# Patient Record
Sex: FEMALE | Race: WHITE | Employment: OTHER | URBAN - NONMETROPOLITAN AREA
[De-identification: names, ages, dates, MRNs, and addresses within clinical notes are randomized per-mention and may not be internally consistent; named-entity substitution may affect disease eponyms.]

---

## 2017-01-05 ENCOUNTER — AMBUL SURGICAL CARE (OUTPATIENT)
Dept: URBAN - NONMETROPOLITAN AREA SURGERY 1 | Facility: SURGERY | Age: 57
Setting detail: OPHTHALMOLOGY
End: 2017-01-05
Payer: COMMERCIAL

## 2017-01-05 DIAGNOSIS — H25.011: ICD-10-CM

## 2017-01-05 DIAGNOSIS — H25.041: ICD-10-CM

## 2017-01-05 PROCEDURE — 66984 XCAPSL CTRC RMVL W/O ECP: CPT | Performed by: OPHTHALMOLOGY

## 2017-01-05 PROCEDURE — G8918 PT W/O PREOP ORDER IV AB PRO: HCPCS | Performed by: OPHTHALMOLOGY

## 2017-01-05 PROCEDURE — G8907 PT DOC NO EVENTS ON DISCHARG: HCPCS | Performed by: OPHTHALMOLOGY

## 2017-01-06 ENCOUNTER — DOCTOR'S OFFICE (OUTPATIENT)
Dept: URBAN - NONMETROPOLITAN AREA CLINIC 1 | Facility: CLINIC | Age: 57
Setting detail: OPHTHALMOLOGY
End: 2017-01-06

## 2017-01-06 ENCOUNTER — RX ONLY (RX ONLY)
Age: 57
End: 2017-01-06

## 2017-01-06 ENCOUNTER — DOCTOR'S OFFICE (OUTPATIENT)
Dept: URBAN - NONMETROPOLITAN AREA CLINIC 1 | Facility: CLINIC | Age: 57
Setting detail: OPHTHALMOLOGY
End: 2017-01-06
Payer: COMMERCIAL

## 2017-01-06 DIAGNOSIS — H25.011: ICD-10-CM

## 2017-01-06 DIAGNOSIS — H25.012: ICD-10-CM

## 2017-01-06 DIAGNOSIS — Z96.1: ICD-10-CM

## 2017-01-06 PROCEDURE — 99024 POSTOP FOLLOW-UP VISIT: CPT | Performed by: OPTOMETRIST

## 2017-01-06 PROCEDURE — 76519 ECHO EXAM OF EYE: CPT | Performed by: OPHTHALMOLOGY

## 2017-01-06 ASSESSMENT — SPHEQUIV_DERIVED
OS_SPHEQUIV: -8.5
OD_SPHEQUIV: -0.875

## 2017-01-06 ASSESSMENT — REFRACTION_AUTOREFRACTION
OS_CYLINDER: -1.00
OS_AXIS: 93
OS_SPHERE: -8.00
OD_SPHERE: -0.50
OD_AXIS: 148
OD_CYLINDER: -0.75

## 2017-01-06 ASSESSMENT — REFRACTION_CURRENTRX
OS_OVR_VA: 20/
OD_AXIS: 104
OD_VPRISM_DIRECTION: SV
OD_OVR_VA: 20/
OS_OVR_VA: 20/
OS_CYLINDER: -1.75
OD_SPHERE: -8.75
OS_AXIS: 97
OD_CYLINDER: -1.50
OD_OVR_VA: 20/
OS_SPHERE: -7.50
OD_OVR_VA: 20/
OS_OVR_VA: 20/
OS_VPRISM_DIRECTION: SV

## 2017-01-06 ASSESSMENT — REFRACTION_MANIFEST
OS_VA2: 20/
OS_VA3: 20/
OD_VA1: 20/
OD_VA3: 20/
OS_VA3: 20/
OS_VA1: 20/
OU_VA: 20/
OD_VA2: 20/
OU_VA: 20/
OS_VA2: 20/
OD_VA3: 20/
OD_VA2: 20/
OS_VA1: 20/
OS_VA3: 20/
OD_VA3: 20/
OU_VA: 20/
OS_VA1: 20/
OD_VA1: 20/
OS_VA2: 20/
OD_VA1: 20/
OD_VA2: 20/

## 2017-01-06 ASSESSMENT — VISUAL ACUITY
OD_BCVA: 20/25-2
OS_BCVA: 20/40-1

## 2017-01-12 ENCOUNTER — AMBUL SURGICAL CARE (OUTPATIENT)
Dept: URBAN - NONMETROPOLITAN AREA SURGERY 1 | Facility: SURGERY | Age: 57
Setting detail: OPHTHALMOLOGY
End: 2017-01-12
Payer: COMMERCIAL

## 2017-01-12 DIAGNOSIS — H25.042: ICD-10-CM

## 2017-01-12 DIAGNOSIS — H25.012: ICD-10-CM

## 2017-01-12 PROCEDURE — 66984 XCAPSL CTRC RMVL W/O ECP: CPT | Performed by: OPHTHALMOLOGY

## 2017-01-12 PROCEDURE — G8918 PT W/O PREOP ORDER IV AB PRO: HCPCS | Performed by: OPHTHALMOLOGY

## 2017-01-12 PROCEDURE — G8907 PT DOC NO EVENTS ON DISCHARG: HCPCS | Performed by: OPHTHALMOLOGY

## 2017-01-13 ENCOUNTER — RX ONLY (RX ONLY)
Age: 57
End: 2017-01-13

## 2017-01-13 ENCOUNTER — DOCTOR'S OFFICE (OUTPATIENT)
Dept: URBAN - NONMETROPOLITAN AREA CLINIC 1 | Facility: CLINIC | Age: 57
Setting detail: OPHTHALMOLOGY
End: 2017-01-13
Payer: COMMERCIAL

## 2017-01-13 DIAGNOSIS — Z96.1: ICD-10-CM

## 2017-01-13 PROBLEM — H25.012 CATARACT CORTICAL SENILE; LEFT EYE: Status: RESOLVED | Noted: 2017-01-13 | Resolved: 2017-01-13

## 2017-01-13 PROCEDURE — 99024 POSTOP FOLLOW-UP VISIT: CPT | Performed by: OPHTHALMOLOGY

## 2017-01-13 ASSESSMENT — REFRACTION_MANIFEST
OS_VA1: 20/
OD_VA1: 20/
OU_VA: 20/
OS_VA2: 20/
OS_VA1: 20/
OS_VA3: 20/
OS_VA1: 20/
OS_VA2: 20/
OU_VA: 20/
OS_VA2: 20/
OD_VA1: 20/
OD_VA2: 20/
OD_VA3: 20/
OD_VA2: 20/
OS_VA3: 20/
OD_VA2: 20/
OU_VA: 20/
OD_VA3: 20/
OD_VA1: 20/
OD_VA3: 20/
OS_VA3: 20/

## 2017-01-13 ASSESSMENT — REFRACTION_CURRENTRX
OS_OVR_VA: 20/
OD_VPRISM_DIRECTION: SV
OS_VPRISM_DIRECTION: SV
OD_AXIS: 104
OS_OVR_VA: 20/
OS_OVR_VA: 20/
OD_CYLINDER: -1.50
OD_OVR_VA: 20/
OD_OVR_VA: 20/
OD_SPHERE: -8.75
OD_OVR_VA: 20/
OS_SPHERE: -7.50
OS_CYLINDER: -1.75
OS_AXIS: 97

## 2017-01-13 ASSESSMENT — SPHEQUIV_DERIVED
OD_SPHEQUIV: -0.5
OS_SPHEQUIV: 0.375

## 2017-01-13 ASSESSMENT — REFRACTION_AUTOREFRACTION
OS_AXIS: 060
OS_CYLINDER: -0.75
OD_AXIS: 142
OD_SPHERE: +1.25
OS_SPHERE: +0.75
OD_CYLINDER: -3.50

## 2017-01-13 ASSESSMENT — VISUAL ACUITY
OS_BCVA: 20/40
OD_BCVA: 20/30-1

## 2017-01-24 ENCOUNTER — DOCTOR'S OFFICE (OUTPATIENT)
Dept: URBAN - NONMETROPOLITAN AREA CLINIC 1 | Facility: CLINIC | Age: 57
Setting detail: OPHTHALMOLOGY
End: 2017-01-24
Payer: COMMERCIAL

## 2017-01-24 DIAGNOSIS — Z96.1: ICD-10-CM

## 2017-01-24 DIAGNOSIS — H04.122: ICD-10-CM

## 2017-01-24 DIAGNOSIS — H04.121: ICD-10-CM

## 2017-01-24 PROCEDURE — 99024 POSTOP FOLLOW-UP VISIT: CPT | Performed by: OPHTHALMOLOGY

## 2017-01-24 ASSESSMENT — REFRACTION_MANIFEST
OU_VA: 20/
OS_VA2: 20/
OS_VA3: 20/
OD_VA2: 20/
OD_VA1: 20/
OD_VA1: 20/
OS_VA1: 20/
OS_VA2: 20/
OS_VA3: 20/
OD_VA1: 20/
OS_VA1: 20/
OS_VA3: 20/
OD_VA3: 20/
OS_VA2: 20/
OD_VA3: 20/
OD_VA3: 20/
OU_VA: 20/
OU_VA: 20/
OD_VA2: 20/
OS_VA1: 20/
OD_VA2: 20/

## 2017-01-24 ASSESSMENT — REFRACTION_CURRENTRX
OS_SPHERE: -7.50
OD_SPHERE: -8.75
OD_OVR_VA: 20/
OS_OVR_VA: 20/
OS_CYLINDER: -1.75
OD_OVR_VA: 20/
OD_AXIS: 104
OS_OVR_VA: 20/
OD_CYLINDER: -1.50
OD_OVR_VA: 20/
OS_AXIS: 97
OS_OVR_VA: 20/
OD_VPRISM_DIRECTION: SV
OS_VPRISM_DIRECTION: SV

## 2017-01-24 ASSESSMENT — SPHEQUIV_DERIVED
OD_SPHEQUIV: -0.75
OS_SPHEQUIV: 0.125

## 2017-01-24 ASSESSMENT — DRY EYES - PHYSICIAN NOTES
OS_GENERALCOMMENTS: KSICCA
OD_GENERALCOMMENTS: KSICCA

## 2017-01-24 ASSESSMENT — CONFRONTATIONAL VISUAL FIELD TEST (CVF)
OD_FINDINGS: FULL
OS_FINDINGS: FULL

## 2017-01-24 ASSESSMENT — VISUAL ACUITY
OD_BCVA: 20/25+2
OS_BCVA: 20/40

## 2017-01-24 ASSESSMENT — REFRACTION_AUTOREFRACTION
OD_AXIS: 117
OD_SPHERE: -0.25
OD_CYLINDER: -1.00
OS_CYLINDER: -0.75
OS_SPHERE: +0.50
OS_AXIS: 066

## 2017-06-08 ENCOUNTER — DOCTOR'S OFFICE (OUTPATIENT)
Dept: URBAN - NONMETROPOLITAN AREA CLINIC 1 | Facility: CLINIC | Age: 57
Setting detail: OPHTHALMOLOGY
End: 2017-06-08

## 2017-06-08 DIAGNOSIS — Z96.1: ICD-10-CM

## 2017-06-08 PROCEDURE — DISABILITY BUREAU OF DISABILITY REPORT: Performed by: OPHTHALMOLOGY

## 2017-07-25 ENCOUNTER — DOCTOR'S OFFICE (OUTPATIENT)
Dept: URBAN - NONMETROPOLITAN AREA CLINIC 1 | Facility: CLINIC | Age: 57
Setting detail: OPHTHALMOLOGY
End: 2017-07-25
Payer: COMMERCIAL

## 2017-07-25 DIAGNOSIS — E11.9: ICD-10-CM

## 2017-07-25 DIAGNOSIS — H52.13: ICD-10-CM

## 2017-07-25 DIAGNOSIS — H43.813: ICD-10-CM

## 2017-07-25 DIAGNOSIS — H04.121: ICD-10-CM

## 2017-07-25 DIAGNOSIS — Z96.1: ICD-10-CM

## 2017-07-25 DIAGNOSIS — H26.492: ICD-10-CM

## 2017-07-25 DIAGNOSIS — H04.122: ICD-10-CM

## 2017-07-25 DIAGNOSIS — H40.013: ICD-10-CM

## 2017-07-25 PROCEDURE — 92250 FUNDUS PHOTOGRAPHY W/I&R: CPT | Performed by: OPHTHALMOLOGY

## 2017-07-25 PROCEDURE — 83861 MICROFLUID ANALY TEARS: CPT | Performed by: OPHTHALMOLOGY

## 2017-07-25 PROCEDURE — 92014 COMPRE OPH EXAM EST PT 1/>: CPT | Performed by: OPHTHALMOLOGY

## 2017-07-25 ASSESSMENT — REFRACTION_CURRENTRX
OD_OVR_VA: 20/
OD_CYLINDER: -1.50
OD_VPRISM_DIRECTION: SV
OS_OVR_VA: 20/
OS_SPHERE: -7.50
OS_CYLINDER: -1.75
OD_OVR_VA: 20/
OS_AXIS: 97
OD_OVR_VA: 20/
OS_OVR_VA: 20/
OS_VPRISM_DIRECTION: SV
OD_AXIS: 104
OD_SPHERE: -8.75
OS_OVR_VA: 20/

## 2017-07-25 ASSESSMENT — REFRACTION_MANIFEST
OS_VA3: 20/
OS_VA2: 20/
OD_VA3: 20/
OD_VA2: 20/
OS_VA3: 20/
OD_VA3: 20/
OD_VA1: 20/
OS_VA2: 20/
OS_VA1: 20/
OU_VA: 20/
OD_VA3: 20/
OS_VA1: 20/
OU_VA: 20/
OD_VA1: 20/
OD_VA2: 20/
OD_VA1: 20/
OU_VA: 20/
OS_VA2: 20/
OD_VA2: 20/
OS_VA3: 20/
OS_VA1: 20/

## 2017-07-25 ASSESSMENT — DRY EYES - PHYSICIAN NOTES
OD_GENERALCOMMENTS: KSICCA
OS_GENERALCOMMENTS: KSICCA

## 2017-07-25 ASSESSMENT — CONFRONTATIONAL VISUAL FIELD TEST (CVF)
OD_FINDINGS: FULL
OS_FINDINGS: FULL

## 2017-07-25 ASSESSMENT — VISUAL ACUITY
OS_BCVA: 20/40
OD_BCVA: 20/20

## 2017-07-25 ASSESSMENT — SPHEQUIV_DERIVED: OD_SPHEQUIV: -1.25

## 2017-07-25 ASSESSMENT — REFRACTION_AUTOREFRACTION
OD_SPHERE: -0.50
OD_CYLINDER: -1.50
OD_AXIS: 129

## 2017-08-02 ENCOUNTER — DOCTOR'S OFFICE (OUTPATIENT)
Dept: URBAN - NONMETROPOLITAN AREA CLINIC 1 | Facility: CLINIC | Age: 57
Setting detail: OPHTHALMOLOGY
End: 2017-08-02
Payer: COMMERCIAL

## 2017-08-02 DIAGNOSIS — H40.013: ICD-10-CM

## 2017-08-02 DIAGNOSIS — H26.493: ICD-10-CM

## 2017-08-02 DIAGNOSIS — H04.123: ICD-10-CM

## 2017-08-02 DIAGNOSIS — H43.812: ICD-10-CM

## 2017-08-02 DIAGNOSIS — E11.9: ICD-10-CM

## 2017-08-02 PROCEDURE — 92014 COMPRE OPH EXAM EST PT 1/>: CPT | Performed by: OPHTHALMOLOGY

## 2017-08-02 ASSESSMENT — REFRACTION_MANIFEST
OD_VA3: 20/
OS_VA3: 20/
OU_VA: 20/
OS_VA1: 20/
OS_VA3: 20/
OD_VA2: 20/
OU_VA: 20/
OS_VA2: 20/
OD_VA1: 20/
OU_VA: 20/
OD_VA1: 20/
OS_VA1: 20/
OS_VA3: 20/
OD_VA3: 20/
OD_VA1: 20/
OS_VA2: 20/
OD_VA2: 20/
OS_VA1: 20/
OS_VA2: 20/
OD_VA3: 20/
OD_VA2: 20/

## 2017-08-02 ASSESSMENT — REFRACTION_AUTOREFRACTION
OD_SPHERE: -0.50
OD_CYLINDER: -1.50
OD_AXIS: 129

## 2017-08-02 ASSESSMENT — REFRACTION_CURRENTRX
OS_SPHERE: -7.50
OD_SPHERE: -8.75
OD_OVR_VA: 20/
OS_OVR_VA: 20/
OD_OVR_VA: 20/
OS_OVR_VA: 20/
OD_CYLINDER: -1.50
OS_VPRISM_DIRECTION: SV
OS_OVR_VA: 20/
OS_AXIS: 97
OS_CYLINDER: -1.75
OD_AXIS: 104
OD_OVR_VA: 20/
OD_VPRISM_DIRECTION: SV

## 2017-08-02 ASSESSMENT — VISUAL ACUITY
OD_BCVA: 20/20
OS_BCVA: 20/50

## 2017-08-02 ASSESSMENT — CONFRONTATIONAL VISUAL FIELD TEST (CVF)
OD_FINDINGS: FULL
OS_FINDINGS: FULL

## 2017-08-02 ASSESSMENT — SPHEQUIV_DERIVED: OD_SPHEQUIV: -1.25

## 2017-08-02 ASSESSMENT — DRY EYES - PHYSICIAN NOTES
OS_GENERALCOMMENTS: KSICCA
OD_GENERALCOMMENTS: KSICCA

## 2019-09-12 PROBLEM — N61.1 BREAST ABSCESS: Status: ACTIVE | Noted: 2019-09-12

## 2019-09-12 PROBLEM — N61.0 CELLULITIS OF BREAST: Status: ACTIVE | Noted: 2019-09-12

## 2019-11-11 NOTE — PERIOP NOTES
PAT - SURGICAL PRE-ADMISSION INSTRUCTIONS    NAME:  Eugenia Ansari                                                          TODAY'S DATE:  11/11/2019    SURGERY DATE:  11/12/2019                                  SURGERY ARRIVAL TIME:   0700    1. Do NOT eat or drink anything, including candy or gum, after MIDNIGHT on 11/11/19 , unless you have specific instructions from your Surgeon or Anesthesia Provider to do so. 2. No smoking on the day of surgery. 3. No alcohol 24 hours prior to the day of surgery. 4. No recreational drugs for one week prior to the day of surgery. 5. Leave all valuables, including money/purse, at home. 6. Remove all jewelry, nail polish, makeup (including mascara); no lotions, powders, deodorant, or perfume/cologne/after shave. 7. Glasses/Contact lenses and Dentures may be worn to the hospital.  They will be removed prior to surgery. 8. Call your doctor if symptoms of a cold or illness develop within 24 ours prior to surgery. 9. AN ADULT MUST DRIVE YOU HOME AFTER OUTPATIENT SURGERY. 10. If you are having an OUTPATIENT procedure, please make arrangements for a responsible adult to be with you for 24 hours after your surgery. 11. If you are admitted to the hospital, you will be assigned to a bed after surgery is complete. Normally a family member will not be able to see you until you are in your assigned bed. 15. Family is encouraged to accompany you to the hospital.  We ask visitors in the treatment area to be limited to ONE person at a time to ensure patient privacy. EXCEPTIONS WILL BE MADE AS NEEDED. 15. Children under 12 are discouraged from entering the treatment area and need to be supervised by an adult when in the waiting room. Special Instructions: Take these medications the morning of surgery with a sip of water:  AMLODIPINE, Follow physician instructions about insulin.   If NO instructions were given, take your usual dose of BASAL insulin the night BEFORE surgery. Patient Prep:    shower with anti-bacterial soap    These surgical instructions were reviewed with PATIENT during the PAT PHONE CALL. Directions: On the morning of surgery, please go to the [facility]. Enter the building from the [facility] entrance, 1st floor (next to the Emergency Room entrance). Take the elevator to the 2nd floor. Sign in at the Registration Desk.     If you have any questions and/or concerns, please do not hesitate to call:  (Prior to the day of surgery)  [facility] [place] unit:  [phone]  (Day of surgery)  [facility] unit:  [phone]

## 2019-11-12 ENCOUNTER — HOSPITAL ENCOUNTER (OUTPATIENT)
Age: 59
Setting detail: OUTPATIENT SURGERY
Discharge: HOME | End: 2019-11-12
Attending: INTERNAL MEDICINE | Admitting: INTERNAL MEDICINE
Payer: MEDICAID

## 2019-11-12 ENCOUNTER — ANESTHESIA (OUTPATIENT)
Dept: ENDOSCOPY | Age: 59
End: 2019-11-12
Payer: MEDICAID

## 2019-11-12 ENCOUNTER — ANESTHESIA EVENT (OUTPATIENT)
Dept: ENDOSCOPY | Age: 59
End: 2019-11-12
Payer: MEDICAID

## 2019-11-12 VITALS
HEART RATE: 79 BPM | OXYGEN SATURATION: 98 % | WEIGHT: 163.8 LBS | SYSTOLIC BLOOD PRESSURE: 127 MMHG | TEMPERATURE: 98.3 F | RESPIRATION RATE: 16 BRPM | HEIGHT: 61 IN | BODY MASS INDEX: 30.93 KG/M2 | DIASTOLIC BLOOD PRESSURE: 65 MMHG

## 2019-11-12 LAB
GLUCOSE BLD STRIP.AUTO-MCNC: 115 MG/DL (ref 70–110)
GLUCOSE BLD STRIP.AUTO-MCNC: 118 MG/DL (ref 70–110)
GLUCOSE BLD STRIP.AUTO-MCNC: 128 MG/DL (ref 70–110)

## 2019-11-12 PROCEDURE — 74011250636 HC RX REV CODE- 250/636: Performed by: NURSE ANESTHETIST, CERTIFIED REGISTERED

## 2019-11-12 PROCEDURE — 76060000031 HC ANESTHESIA FIRST 0.5 HR: Performed by: INTERNAL MEDICINE

## 2019-11-12 PROCEDURE — 77030037186 HC VLV ENDOSC STRL DEFENDO DISP MVAT -A: Performed by: INTERNAL MEDICINE

## 2019-11-12 PROCEDURE — 88342 IMHCHEM/IMCYTCHM 1ST ANTB: CPT

## 2019-11-12 PROCEDURE — 88305 TISSUE EXAM BY PATHOLOGIST: CPT

## 2019-11-12 PROCEDURE — 76040000019: Performed by: INTERNAL MEDICINE

## 2019-11-12 PROCEDURE — 82962 GLUCOSE BLOOD TEST: CPT

## 2019-11-12 RX ORDER — SODIUM CHLORIDE 0.9 % (FLUSH) 0.9 %
5-40 SYRINGE (ML) INJECTION EVERY 8 HOURS
Status: CANCELLED | OUTPATIENT
Start: 2019-11-12

## 2019-11-12 RX ORDER — SODIUM CHLORIDE 0.9 % (FLUSH) 0.9 %
5-40 SYRINGE (ML) INJECTION AS NEEDED
Status: CANCELLED | OUTPATIENT
Start: 2019-11-12

## 2019-11-12 RX ORDER — DEXTROMETHORPHAN/PSEUDOEPHED 2.5-7.5/.8
1.2 DROPS ORAL
Status: CANCELLED | OUTPATIENT
Start: 2019-11-12

## 2019-11-12 RX ORDER — SODIUM CHLORIDE, SODIUM LACTATE, POTASSIUM CHLORIDE, CALCIUM CHLORIDE 600; 310; 30; 20 MG/100ML; MG/100ML; MG/100ML; MG/100ML
INJECTION, SOLUTION INTRAVENOUS
Status: DISCONTINUED | OUTPATIENT
Start: 2019-11-12 | End: 2019-11-12 | Stop reason: HOSPADM

## 2019-11-12 RX ORDER — PROPOFOL 10 MG/ML
INJECTION, EMULSION INTRAVENOUS AS NEEDED
Status: DISCONTINUED | OUTPATIENT
Start: 2019-11-12 | End: 2019-11-12 | Stop reason: HOSPADM

## 2019-11-12 RX ADMIN — PROPOFOL 100 MG: 10 INJECTION, EMULSION INTRAVENOUS at 09:11

## 2019-11-12 RX ADMIN — SODIUM CHLORIDE, SODIUM LACTATE, POTASSIUM CHLORIDE, AND CALCIUM CHLORIDE: 600; 310; 30; 20 INJECTION, SOLUTION INTRAVENOUS at 08:40

## 2019-11-12 RX ADMIN — PROPOFOL 20 MG: 10 INJECTION, EMULSION INTRAVENOUS at 09:13

## 2019-11-12 NOTE — ANESTHESIA POSTPROCEDURE EVALUATION
Procedure(s):  ESOPHAGOGASTRODUODENOSCOPY (EGD).     MAC    Anesthesia Post Evaluation        Patient location during evaluation: PACU  Patient participation: complete - patient participated  Level of consciousness: awake and alert  Pain score: 0  Airway patency: patent  Anesthetic complications: no  Cardiovascular status: stable  Respiratory status: room air  Hydration status: stable  Post anesthesia nausea and vomiting:  none      Vitals Value Taken Time   /65 11/12/2019  9:21 AM   Temp     Pulse 79 11/12/2019  9:21 AM   Resp 16 11/12/2019  9:21 AM   SpO2 98 % 11/12/2019  9:21 AM

## 2019-11-12 NOTE — PROCEDURES
Endoscopy Procedure Note    Patient: Federcio Echavarria MRN: [id number]  SSN: [id number]    YOB: 1960  Age: 63 y.o. Sex: female      Date/Time:  11/12/2019 9:20 AM    Esophagogastroduodenoscopy (EGD) Procedure Note    Procedure: Esophagogastroduodenoscopy with biopsy    IMPRESSION:   1. Mild gastritis. Biopsies taken for H. Pylori. 2. Otherwise normal EGD. RECOMMENDATIONS:  1. Resume regular diet. 2. Will contact with biopsy results in 1-2 weeks. 3. Follow up in office in 4-6 weeks. Indication: Abdominal pain, epigastric  :  James Heller MD  Assistants: Endoscopy Technician-1: Edward Sutherland CNA  PACU Nurse: Rusty Lomax RN    Referring Provider:   None  History: The history and physical exam were reviewed and updated. Endoscope: Olympus GIF-190 diagnostic endoscope  Extent of Exam: third portion of the duodenum  ASA: ASA 3 - Patient with moderate systemic disease with functional limitations  Anethesia/Sedation:  MAC anesthesia    Description of the procedure: The procedure was discussed with the patient including risks, benefits, alternatives including risks of iv sedation, bleeding, perforation and aspiration. A safety timeout was performed. The patient was placed in the left lateral decubitus position. A bite block was placed. The patient was given incremental doses of intravenous sedation until moderate sedation was achieved. The patients vital signs were monitored at all times including heart rate/rhythm, blood pressure and oxygen saturation. The endoscope was then passed under direct visualization to the third portion of the duodenum. The endoscope was then slowly withdrawn while visualizing the mucosa. In the stomach a retroflexion was performed and gastric fundus and cardia visualized. The endoscope was then slowly withdrawn. The patient was then transferred to recovery in stable condition. Findings:    Esophagus: The esophageal mucosa was normal with no ulceration, mass or stricture. There was no evidence of Brady's esophagus or reflux esophagitis. Stomach: The gastric mucosa was erythematous. No ulceration, mass, stricture. Biopsies taken for H. Pylori. Duodenum: The duodenum mucosa was normal with no ulceration, mass, stricture and no evidence of villous atrophy. Therapies:  None    Specimens:   ID Type Source Tests Collected by Time Destination   1 : bx r/o h pylori x 3 Preservative Gastric  Clista MD Mireille 11/12/2019 8332 Pathology               Complications:   None; patient tolerated the procedure well.     EBL:None    Discharge disposition:  Home in the company of  when able to ambulate    Laurence Thrasher MD  November 12, 2019  9:20 AM

## 2019-11-12 NOTE — ANESTHESIA PREPROCEDURE EVALUATION
Relevant Problems   No relevant active problems       Anesthetic History   No history of anesthetic complications            Review of Systems / Medical History  Patient summary reviewed, nursing notes reviewed and pertinent labs reviewed    Pulmonary  Within defined limits                 Neuro/Psych   Within defined limits           Cardiovascular    Hypertension              Exercise tolerance: >4 METS     GI/Hepatic/Renal     GERD      PUD     Endo/Other    Diabetes    Morbid obesity     Other Findings   Comments: neuropathy           Physical Exam    Airway  Mallampati: II  TM Distance: 4 - 6 cm    Mouth opening: Normal     Cardiovascular  Regular rate and rhythm,  S1 and S2 normal,  no murmur, click, rub, or gallop  Rhythm: regular  Rate: normal         Dental    Dentition: Full lower dentures and Full upper dentures     Pulmonary  Breath sounds clear to auscultation               Abdominal  GI exam deferred       Other Findings            Anesthetic Plan    ASA: 3  Anesthesia type: MAC          Induction: Intravenous  Anesthetic plan and risks discussed with: Patient

## 2019-11-12 NOTE — DISCHARGE INSTRUCTIONS
Patient Discharge Instructions    Carlyn Casillas / [id number] : 1960    Admitted 2019 Discharged: 2019         Procedure Impression:  1. Mild gastritis. Biopsies taken for H. Pylori. 2. Otherwise normal EGD. Recommendation:  1. Resume regular diet. 2. Will contact with biopsy results in 2 weeks   3. Follow up in office in 4 weeks. Recommended Diet: Regular Diet    Recommended Activity:    1. Do not drink alcohol, drive or operate machinery for 12 hours   2. Call if any fever, abdominal pain or bleeding noted. Signed By: Yusuf Gupta MD     2019         DISCHARGE SUMMARY from Nurse    PATIENT INSTRUCTIONS:    After general anesthesia or intravenous sedation, for 24 hours or while taking prescription Narcotics:  · Limit your activities  · Do not drive and operate hazardous machinery  · Do not make important personal or business decisions  · Do  not drink alcoholic beverages  · If you have not urinated within 8 hours after discharge, please contact your surgeon on call. Report the following to your surgeon:  · Excessive pain, swelling, redness or odor of or around the surgical area  · Temperature over 100.5  · Nausea and vomiting lasting longer than 4 hours or if unable to take medications  · Any signs of decreased circulation or nerve impairment to extremity: change in color, persistent  numbness, tingling, coldness or increase pain  · Any questions      These are general instructions for a healthy lifestyle:    No smoking/ No tobacco products/ Avoid exposure to second hand smoke  Surgeon General's Warning:  Quitting smoking now greatly reduces serious risk to your health.     Obesity, smoking, and sedentary lifestyle greatly increases your risk for illness    A healthy diet, regular physical exercise & weight monitoring are important for maintaining a healthy lifestyle    You may be retaining fluid if you have a history of heart failure or if you experience any of the following symptoms:  Weight gain of 3 pounds or more overnight or 5 pounds in a week, increased swelling in our hands or feet or shortness of breath while lying flat in bed. Please call your doctor as soon as you notice any of these symptoms; do not wait until your next office visit. The discharge information has been reviewed with the patient. The patient verbalized understanding. Discharge medications reviewed with the patient and appropriate educational materials and side effects teaching were provided. ___________Patient armband removed and given to patient to take home.   Patient was informed of the privacy risks if armband lost or stolen  ________________________________________________________________________________________________________________________

## 2019-11-12 NOTE — PERIOP NOTES
Phase 2 Recovery Summary    Report received from [facility]. Md discussed procedure with pt. Vitals:    11/11/19 1729 11/12/19 0742 11/12/19 0920 11/12/19 0921   BP:  176/78 127/65 127/65   Pulse:  87 79 79   Resp:  16 16 16   Temp:  98.3 °F (36.8 °C)     SpO2:  99% 98% 98%   Weight: 73.5 kg (162 lb) 74.3 kg (163 lb 12.8 oz)     Height: 5' 1\" (1.549 m) 5' 1\" (1.549 m)         oriented to time, place, person and situation          Lines and Drains  Peripheral Intravenous Line:      Wound  Wound Breast Right (Active)   Number of days: 60        Patient assisted to chair with minimal assist. Pateint tolerating liquids well. Patient's family at bedside. Discharge discussed with patient and family. No questions or concerns at this time. Patient had time to ask any questions. Patient discharged to home, with FM.       Eh Benz RN

## 2019-11-12 NOTE — H&P
Date of Surgery Update:  Jerome Gann was seen and examined. History and physical has been reviewed. The patient has been examined.  There have been no significant clinical changes since the completion of the originally dated History and Physical.    Signed By: Yvette Luciano MD     November 12, 2019 8:58 AM

## 2019-11-26 ENCOUNTER — DOCTOR'S OFFICE (OUTPATIENT)
Dept: URBAN - NONMETROPOLITAN AREA CLINIC 1 | Facility: CLINIC | Age: 59
Setting detail: OPHTHALMOLOGY
End: 2019-11-26
Payer: COMMERCIAL

## 2019-11-26 DIAGNOSIS — H04.123: ICD-10-CM

## 2019-11-26 DIAGNOSIS — E11.9: ICD-10-CM

## 2019-11-26 DIAGNOSIS — Z96.1: ICD-10-CM

## 2019-11-26 DIAGNOSIS — H40.013: ICD-10-CM

## 2019-11-26 DIAGNOSIS — H26.493: ICD-10-CM

## 2019-11-26 DIAGNOSIS — H43.812: ICD-10-CM

## 2019-11-26 PROCEDURE — 92133 CPTRZD OPH DX IMG PST SGM ON: CPT | Performed by: OPHTHALMOLOGY

## 2019-11-26 PROCEDURE — 76514 ECHO EXAM OF EYE THICKNESS: CPT | Performed by: OPHTHALMOLOGY

## 2019-11-26 PROCEDURE — 92014 COMPRE OPH EXAM EST PT 1/>: CPT | Performed by: OPHTHALMOLOGY

## 2019-11-26 ASSESSMENT — REFRACTION_MANIFEST
OU_VA: 20/
OD_VA2: 20/
OS_VA3: 20/
OS_VA1: 20/
OD_VA3: 20/
OD_VA2: 20/
OS_VA2: 20/
OS_VA2: 20/
OD_VA1: 20/
OS_VA3: 20/
OU_VA: 20/
OD_VA1: 20/
OD_VA3: 20/
OS_VA1: 20/

## 2019-11-26 ASSESSMENT — REFRACTION_CURRENTRX
OS_OVR_VA: 20/
OS_OVR_VA: 20/
OD_OVR_VA: 20/
OD_AXIS: 104
OS_VPRISM_DIRECTION: SV
OS_AXIS: 97
OS_SPHERE: -7.50
OD_OVR_VA: 20/
OD_OVR_VA: 20/
OS_OVR_VA: 20/
OD_SPHERE: -8.75
OD_VPRISM_DIRECTION: SV
OS_CYLINDER: -1.75
OD_CYLINDER: -1.50

## 2019-11-26 ASSESSMENT — VISUAL ACUITY
OS_BCVA: 20/80
OD_BCVA: 20/30

## 2019-11-26 ASSESSMENT — PACHYMETRY
OD_CT_UM: 575
OD_CT_CORRECTION: -2
OS_CT_UM: 584
OS_CT_CORRECTION: -3

## 2019-11-26 ASSESSMENT — CONFRONTATIONAL VISUAL FIELD TEST (CVF)
OD_FINDINGS: FULL
OS_FINDINGS: FULL

## 2019-11-26 ASSESSMENT — REFRACTION_AUTOREFRACTION
OD_AXIS: 106
OS_CYLINDER: -0.50
OS_AXIS: 97
OS_SPHERE: -0.25
OD_CYLINDER: -0.75
OD_SPHERE: -0.50

## 2019-11-26 ASSESSMENT — SPHEQUIV_DERIVED
OS_SPHEQUIV: -0.5
OD_SPHEQUIV: -0.875

## 2019-11-26 ASSESSMENT — DRY EYES - PHYSICIAN NOTES
OS_GENERALCOMMENTS: 1+ PEE
OD_GENERALCOMMENTS: 1+ PEE

## 2020-06-09 ENCOUNTER — RX ONLY (RX ONLY)
Age: 60
End: 2020-06-09

## 2020-06-09 ENCOUNTER — DOCTOR'S OFFICE (OUTPATIENT)
Dept: URBAN - NONMETROPOLITAN AREA CLINIC 1 | Facility: CLINIC | Age: 60
Setting detail: OPHTHALMOLOGY
End: 2020-06-09
Payer: COMMERCIAL

## 2020-06-09 VITALS — HEIGHT: 55 IN

## 2020-06-09 DIAGNOSIS — H43.812: ICD-10-CM

## 2020-06-09 DIAGNOSIS — Z96.1: ICD-10-CM

## 2020-06-09 DIAGNOSIS — H40.013: ICD-10-CM

## 2020-06-09 DIAGNOSIS — H04.123: ICD-10-CM

## 2020-06-09 DIAGNOSIS — H26.493: ICD-10-CM

## 2020-06-09 DIAGNOSIS — E11.9: ICD-10-CM

## 2020-06-09 PROCEDURE — 92014 COMPRE OPH EXAM EST PT 1/>: CPT | Performed by: OPHTHALMOLOGY

## 2020-06-09 ASSESSMENT — REFRACTION_AUTOREFRACTION
OS_SPHERE: +0.50
OS_AXIS: 096
OD_SPHERE: -1.00
OD_CYLINDER: -1.00
OS_CYLINDER: -2.25
OD_AXIS: 122

## 2020-06-09 ASSESSMENT — REFRACTION_CURRENTRX
OS_OVR_VA: 20/
OD_AXIS: 104
OD_VPRISM_DIRECTION: SV
OD_OVR_VA: 20/
OS_SPHERE: -7.50
OS_AXIS: 97
OS_CYLINDER: -1.75
OD_SPHERE: -8.75
OD_CYLINDER: -1.50
OS_VPRISM_DIRECTION: SV

## 2020-06-09 ASSESSMENT — DRY EYES - PHYSICIAN NOTES
OD_GENERALCOMMENTS: 1+ PEE
OS_GENERALCOMMENTS: 1+ PEE

## 2020-06-09 ASSESSMENT — CONFRONTATIONAL VISUAL FIELD TEST (CVF)
OD_FINDINGS: FULL
OS_FINDINGS: FULL

## 2020-06-09 ASSESSMENT — VISUAL ACUITY
OS_BCVA: 20/70
OD_BCVA: 20/40

## 2020-06-09 ASSESSMENT — SPHEQUIV_DERIVED
OD_SPHEQUIV: -1.5
OS_SPHEQUIV: -0.625

## 2020-06-23 ENCOUNTER — AMBUL SURGICAL CARE (OUTPATIENT)
Dept: URBAN - NONMETROPOLITAN AREA SURGERY 1 | Facility: SURGERY | Age: 60
Setting detail: OPHTHALMOLOGY
End: 2020-06-23
Payer: COMMERCIAL

## 2020-06-23 DIAGNOSIS — H26.492: ICD-10-CM

## 2020-06-23 PROCEDURE — G8918 PT W/O PREOP ORDER IV AB PRO: HCPCS | Performed by: CLINIC/CENTER

## 2020-06-23 PROCEDURE — 66821 AFTER CATARACT LASER SURGERY: CPT | Performed by: OPHTHALMOLOGY

## 2020-06-23 PROCEDURE — G8907 PT DOC NO EVENTS ON DISCHARG: HCPCS | Performed by: OPHTHALMOLOGY

## 2020-06-23 PROCEDURE — 66821 AFTER CATARACT LASER SURGERY: CPT | Performed by: CLINIC/CENTER

## 2020-06-23 PROCEDURE — G8918 PT W/O PREOP ORDER IV AB PRO: HCPCS | Performed by: OPHTHALMOLOGY

## 2020-06-23 PROCEDURE — G8907 PT DOC NO EVENTS ON DISCHARG: HCPCS | Performed by: CLINIC/CENTER

## 2020-11-03 ENCOUNTER — DOCTOR'S OFFICE (OUTPATIENT)
Dept: URBAN - NONMETROPOLITAN AREA CLINIC 1 | Facility: CLINIC | Age: 60
Setting detail: OPHTHALMOLOGY
End: 2020-11-03
Payer: COMMERCIAL

## 2020-11-03 VITALS — HEIGHT: 55 IN

## 2020-11-03 DIAGNOSIS — H26.491: ICD-10-CM

## 2020-11-03 DIAGNOSIS — H43.812: ICD-10-CM

## 2020-11-03 DIAGNOSIS — E11.9: ICD-10-CM

## 2020-11-03 DIAGNOSIS — Z96.1: ICD-10-CM

## 2020-11-03 DIAGNOSIS — H40.013: ICD-10-CM

## 2020-11-03 DIAGNOSIS — H04.123: ICD-10-CM

## 2020-11-03 PROCEDURE — 92133 CPTRZD OPH DX IMG PST SGM ON: CPT | Performed by: OPHTHALMOLOGY

## 2020-11-03 PROCEDURE — 92014 COMPRE OPH EXAM EST PT 1/>: CPT | Performed by: OPHTHALMOLOGY

## 2020-11-03 ASSESSMENT — REFRACTION_CURRENTRX
OS_OVR_VA: 20/
OD_AXIS: 104
OD_SPHERE: -8.75
OS_CYLINDER: -1.75
OS_SPHERE: -7.50
OD_VPRISM_DIRECTION: SV
OS_AXIS: 97
OD_OVR_VA: 20/
OS_VPRISM_DIRECTION: SV
OD_CYLINDER: -1.50

## 2020-11-03 ASSESSMENT — REFRACTION_AUTOREFRACTION
OD_CYLINDER: -0.75
OS_SPHERE: -0.75
OS_CYLINDER: -0.50
OS_AXIS: 84
OD_SPHERE: -1.00
OD_AXIS: 120

## 2020-11-03 ASSESSMENT — CONFRONTATIONAL VISUAL FIELD TEST (CVF)
OS_FINDINGS: FULL
OD_FINDINGS: FULL

## 2020-11-03 ASSESSMENT — PACHYMETRY
OD_CT_UM: 590
OS_CT_CORRECTION: -1
OD_CT_CORRECTION: -4
OS_CT_UM: 567

## 2020-11-03 ASSESSMENT — SPHEQUIV_DERIVED
OS_SPHEQUIV: -1
OD_SPHEQUIV: -1.375

## 2020-11-03 ASSESSMENT — DRY EYES - PHYSICIAN NOTES
OS_GENERALCOMMENTS: 1+ PEE
OD_GENERALCOMMENTS: 1+ PEE

## 2020-11-03 ASSESSMENT — VISUAL ACUITY
OS_BCVA: 20/60-2
OD_BCVA: 20/25-2

## 2020-11-10 ENCOUNTER — AMBUL SURGICAL CARE (OUTPATIENT)
Dept: URBAN - NONMETROPOLITAN AREA SURGERY 1 | Facility: SURGERY | Age: 60
Setting detail: OPHTHALMOLOGY
End: 2020-11-10
Payer: COMMERCIAL

## 2020-11-10 DIAGNOSIS — H26.491: ICD-10-CM

## 2020-11-10 PROCEDURE — G8918 PT W/O PREOP ORDER IV AB PRO: HCPCS | Performed by: OPHTHALMOLOGY

## 2020-11-10 PROCEDURE — 66821 AFTER CATARACT LASER SURGERY: CPT | Performed by: OPHTHALMOLOGY

## 2020-11-10 PROCEDURE — G8907 PT DOC NO EVENTS ON DISCHARG: HCPCS | Performed by: CLINIC/CENTER

## 2020-11-10 PROCEDURE — G8918 PT W/O PREOP ORDER IV AB PRO: HCPCS | Performed by: CLINIC/CENTER

## 2020-11-10 PROCEDURE — 66821 AFTER CATARACT LASER SURGERY: CPT | Performed by: CLINIC/CENTER

## 2020-11-10 PROCEDURE — G8907 PT DOC NO EVENTS ON DISCHARG: HCPCS | Performed by: OPHTHALMOLOGY

## 2020-12-07 DIAGNOSIS — E04.1 NONTOXIC SINGLE THYROID NODULE: ICD-10-CM

## 2020-12-09 ENCOUNTER — HOSPITAL ENCOUNTER (OUTPATIENT)
Dept: ULTRASOUND IMAGING | Facility: HOSPITAL | Age: 60
Discharge: HOME | End: 2020-12-09
Payer: COMMERCIAL

## 2020-12-09 DIAGNOSIS — E04.1 NONTOXIC SINGLE THYROID NODULE: ICD-10-CM

## 2020-12-09 PROCEDURE — 76536 US EXAM OF HEAD AND NECK: CPT

## 2020-12-21 ENCOUNTER — OFFICE VISIT (OUTPATIENT)
Dept: PAIN MEDICINE | Facility: CLINIC | Age: 60
End: 2020-12-21
Payer: COMMERCIAL

## 2020-12-21 VITALS — TEMPERATURE: 97.7 F | BODY MASS INDEX: 31.07 KG/M2 | RESPIRATION RATE: 18 BRPM | HEIGHT: 68 IN | WEIGHT: 205 LBS

## 2020-12-21 DIAGNOSIS — M47.812 CERVICAL FACET JOINT SYNDROME: Primary | ICD-10-CM

## 2020-12-21 PROCEDURE — 99244 OFF/OP CNSLTJ NEW/EST MOD 40: CPT | Performed by: ANESTHESIOLOGY

## 2020-12-21 RX ORDER — METOPROLOL TARTRATE 50 MG/1
50 TABLET, FILM COATED ORAL EVERY 12 HOURS SCHEDULED
COMMUNITY

## 2020-12-21 RX ORDER — GLIMEPIRIDE 1 MG/1
1 TABLET ORAL
COMMUNITY

## 2020-12-21 RX ORDER — AMLODIPINE BESYLATE 10 MG/1
10 TABLET ORAL DAILY
COMMUNITY

## 2020-12-21 RX ORDER — PAPAVERINE HCL 150 MG
4 CAPSULE, EXTENDED RELEASE ORAL ONCE
Status: CANCELLED | OUTPATIENT
Start: 2020-12-21 | End: 2020-12-21

## 2020-12-21 RX ORDER — ASPIRIN 81 MG/1
81 TABLET ORAL DAILY
COMMUNITY
End: 2022-04-04

## 2020-12-21 RX ORDER — LIDOCAINE HYDROCHLORIDE 10 MG/ML
3 INJECTION, SOLUTION EPIDURAL; INFILTRATION; INTRACAUDAL; PERINEURAL ONCE
Status: CANCELLED | OUTPATIENT
Start: 2020-12-21 | End: 2020-12-21

## 2020-12-21 RX ORDER — DULOXETIN HYDROCHLORIDE 60 MG/1
100 CAPSULE, DELAYED RELEASE ORAL DAILY
COMMUNITY

## 2020-12-21 RX ORDER — ROSUVASTATIN CALCIUM 5 MG/1
5 TABLET, COATED ORAL DAILY
COMMUNITY

## 2020-12-21 RX ORDER — FLUTICASONE PROPIONATE 50 MCG
2 SPRAY, SUSPENSION (ML) NASAL AS NEEDED
COMMUNITY

## 2020-12-21 RX ORDER — BUPIVACAINE HCL/PF 2.5 MG/ML
10 VIAL (ML) INJECTION ONCE
Status: CANCELLED | OUTPATIENT
Start: 2020-12-21 | End: 2020-12-21

## 2020-12-21 RX ORDER — CYCLOSPORINE 0.5 MG/ML
1 EMULSION OPHTHALMIC 2 TIMES DAILY
COMMUNITY

## 2020-12-21 RX ORDER — QUETIAPINE FUMARATE 100 MG/1
100 TABLET, FILM COATED ORAL
COMMUNITY

## 2020-12-21 RX ORDER — FAMOTIDINE 20 MG/1
20 TABLET, FILM COATED ORAL 2 TIMES DAILY
COMMUNITY

## 2020-12-29 ENCOUNTER — HOSPITAL ENCOUNTER (OUTPATIENT)
Facility: HOSPITAL | Age: 60
Setting detail: OUTPATIENT SURGERY
Discharge: HOME | End: 2020-12-29
Attending: ANESTHESIOLOGY | Admitting: ANESTHESIOLOGY
Payer: COMMERCIAL

## 2020-12-29 ENCOUNTER — APPOINTMENT (OUTPATIENT)
Dept: RADIOLOGY | Facility: HOSPITAL | Age: 60
End: 2020-12-29
Payer: COMMERCIAL

## 2020-12-29 VITALS
HEART RATE: 71 BPM | OXYGEN SATURATION: 99 % | SYSTOLIC BLOOD PRESSURE: 149 MMHG | BODY MASS INDEX: 31.07 KG/M2 | HEIGHT: 68 IN | DIASTOLIC BLOOD PRESSURE: 82 MMHG | RESPIRATION RATE: 16 BRPM | WEIGHT: 205 LBS | TEMPERATURE: 98.5 F

## 2020-12-29 LAB — GLUCOSE SERPL-MCNC: 221 MG/DL (ref 65–140)

## 2020-12-29 PROCEDURE — 82948 REAGENT STRIP/BLOOD GLUCOSE: CPT

## 2020-12-29 PROCEDURE — 76000 FLUOROSCOPY <1 HR PHYS/QHP: CPT

## 2020-12-29 PROCEDURE — 64490 INJ PARAVERT F JNT C/T 1 LEV: CPT | Performed by: ANESTHESIOLOGY

## 2020-12-29 PROCEDURE — 64492 INJ PARAVERT F JNT C/T 3 LEV: CPT | Performed by: ANESTHESIOLOGY

## 2020-12-29 PROCEDURE — 64491 INJ PARAVERT F JNT C/T 2 LEV: CPT | Performed by: ANESTHESIOLOGY

## 2020-12-29 RX ORDER — BUPIVACAINE HYDROCHLORIDE 2.5 MG/ML
INJECTION, SOLUTION EPIDURAL; INFILTRATION; INTRACAUDAL AS NEEDED
Status: DISCONTINUED | OUTPATIENT
Start: 2020-12-29 | End: 2020-12-29 | Stop reason: HOSPADM

## 2020-12-29 RX ORDER — DEXAMETHASONE SODIUM PHOSPHATE 4 MG/ML
INJECTION, SOLUTION INTRA-ARTICULAR; INTRALESIONAL; INTRAMUSCULAR; INTRAVENOUS; SOFT TISSUE AS NEEDED
Status: DISCONTINUED | OUTPATIENT
Start: 2020-12-29 | End: 2020-12-29 | Stop reason: HOSPADM

## 2020-12-29 RX ORDER — LIDOCAINE HYDROCHLORIDE 10 MG/ML
INJECTION, SOLUTION EPIDURAL; INFILTRATION; INTRACAUDAL; PERINEURAL AS NEEDED
Status: DISCONTINUED | OUTPATIENT
Start: 2020-12-29 | End: 2020-12-29 | Stop reason: HOSPADM

## 2020-12-29 RX ORDER — ALPRAZOLAM 0.5 MG/1
0.5 TABLET ORAL ONCE
Status: COMPLETED | OUTPATIENT
Start: 2020-12-29 | End: 2020-12-29

## 2020-12-29 RX ADMIN — ALPRAZOLAM 0.5 MG: 0.5 TABLET ORAL at 08:18

## 2020-12-31 ENCOUNTER — EVALUATION (OUTPATIENT)
Dept: PHYSICAL THERAPY | Facility: CLINIC | Age: 60
End: 2020-12-31
Payer: COMMERCIAL

## 2020-12-31 DIAGNOSIS — M47.812 CERVICAL FACET JOINT SYNDROME: ICD-10-CM

## 2020-12-31 PROCEDURE — 97140 MANUAL THERAPY 1/> REGIONS: CPT | Performed by: PHYSICAL THERAPIST

## 2020-12-31 PROCEDURE — 97162 PT EVAL MOD COMPLEX 30 MIN: CPT | Performed by: PHYSICAL THERAPIST

## 2020-12-31 PROCEDURE — 97535 SELF CARE MNGMENT TRAINING: CPT | Performed by: PHYSICAL THERAPIST

## 2021-01-05 ENCOUNTER — TELEPHONE (OUTPATIENT)
Dept: PAIN MEDICINE | Facility: CLINIC | Age: 61
End: 2021-01-05

## 2021-01-05 NOTE — TELEPHONE ENCOUNTER
Patient states she had 80% relief that lasted one day with a slight increase in IADL's from right C3-C6 MBB #1  Pain level is currently 7/10  She said she would like to hold off on another procedure at this time because the pain from procedure was very bad  She will continue physical therapy  Please advise

## 2021-03-17 ENCOUNTER — OFFICE VISIT (OUTPATIENT)
Dept: PAIN MEDICINE | Facility: CLINIC | Age: 61
End: 2021-03-17
Payer: MEDICARE

## 2021-03-17 VITALS
SYSTOLIC BLOOD PRESSURE: 132 MMHG | DIASTOLIC BLOOD PRESSURE: 84 MMHG | TEMPERATURE: 97.8 F | HEIGHT: 68 IN | BODY MASS INDEX: 31.17 KG/M2

## 2021-03-17 DIAGNOSIS — M47.812 CERVICAL FACET JOINT SYNDROME: Primary | ICD-10-CM

## 2021-03-17 PROCEDURE — 99214 OFFICE O/P EST MOD 30 MIN: CPT | Performed by: ANESTHESIOLOGY

## 2021-03-17 RX ORDER — BUPIVACAINE HCL/PF 2.5 MG/ML
10 VIAL (ML) INJECTION ONCE
Status: CANCELLED | OUTPATIENT
Start: 2021-03-17 | End: 2021-03-17

## 2021-03-17 RX ORDER — LIDOCAINE HYDROCHLORIDE 10 MG/ML
6 INJECTION, SOLUTION EPIDURAL; INFILTRATION; INTRACAUDAL; PERINEURAL ONCE
Status: CANCELLED | OUTPATIENT
Start: 2021-03-17 | End: 2021-03-17

## 2021-03-17 RX ORDER — PAPAVERINE HCL 150 MG
10 CAPSULE, EXTENDED RELEASE ORAL ONCE
Status: CANCELLED | OUTPATIENT
Start: 2021-03-17 | End: 2021-03-17

## 2021-03-17 NOTE — PATIENT INSTRUCTIONS
Cervical Facet Block   WHAT YOU NEED TO KNOW:   A cervical facet block is a procedure to inject medicine at the facet joints in your cervical (neck) spine  Facet joints are found at the back of each vertebrae  HOW TO PREPARE:   The week before your procedure:   · Arrange to have someone drive you home after your procedure  · Tell your healthcare provider about all the medicines you currently take  He or she will tell you if you need to stop any medicine before the procedure, and when to stop  He or she will tell you which medicines to take or not take on the day of the procedure  · Tell your provider about all your allergies  Tell him or her if you had an allergic reaction to anesthesia, antibiotics, or contrast liquid  · You may need to have blood and urine tests  Tests such as x-rays, a CT scan, or an MRI may also be done  The night before your procedure: You may be told not to eat or drink anything after midnight  The day of your procedure:   · Take only the medicines your healthcare provider told you to take  · You or a close family member will be asked to sign a legal document called a consent form  It gives healthcare providers permission to do the procedure or surgery  It also explains the problems that may happen, and your choices  Make sure all your questions are answered before you sign this form  · Healthcare providers may insert an intravenous tube (IV) into your vein  A vein in the arm is usually chosen  Through the IV tube, you may be given liquids and medicine  · An anesthesiologist will talk to you before your surgery  You may need medicine to keep you asleep or numb an area of your body during surgery  Tell healthcare providers if you or anyone in your family has had a problem with anesthesia in the past     WHAT WILL HAPPEN:   What will happen:   · You will lie on your stomach, with your head and body slightly turned to the side   Your healthcare provider will insert a thin needle near your cervical spine and into the facet joint  He or she will use an x-ray with contrast liquid or a CT scan to help guide the needle  · Your provider will place the needle tip inside or just outside the facet joint and inject the medicine  The medicine may include steroids and anesthesia  Your healthcare provider may inject medicine into more than one area  · Bandages will be placed over the areas where the needles were inserted  After your procedure: You will be taken to a recovery room to rest  Healthcare providers will watch you closely for any problems  Do not get out of bed until your healthcare provider says it is okay  When healthcare providers see that you are okay, you may be able to go home  · Bandages will cover the procedure area  The bandages keep the area clean and dry to prevent infection  A healthcare provider may remove the bandages soon after your procedure to check the injection sites  · Medicines may be given to treat pain, swelling, or fever, or to prevent an infection  CONTACT YOUR HEALTHCARE PROVIDER IF:   · You have a fever  · You have a skin infection or an infected wound on or near the back of your neck  · You have questions or concerns about your procedure  RISKS:   You may have bleeding at the injection site  Nerves, blood vessels, ligaments, muscles, and bones near your spine may be damaged  The medicine may spread past the facet joint and cause numbness in other areas  You may have trouble breathing  Even after you have this procedure, you may still have shoulder or back pain  You may also develop a headache from the procedure  CARE AGREEMENT:   You have the right to help plan your care  Learn about your health condition and how it may be treated  Discuss treatment options with your healthcare providers to decide what care you want to receive  You always have the right to refuse treatment     © Copyright [employer] 2020 Information is for End User's use only and may not be sold, redistributed or otherwise used for commercial purposes  All illustrations and images included in CareNotes® are the copyrighted property of A D A M , Inc  or Irma Becker  The above information is an  only  It is not intended as medical advice for individual conditions or treatments  Talk to your doctor, nurse or pharmacist before following any medical regimen to see if it is safe and effective for you

## 2021-03-17 NOTE — PROGRESS NOTES
Assessment  1  Cervical facet joint syndrome - Right  -     Case request operating room: BLOCK MEDIAL BRANCH C3-C6; Standing  -     Case request operating room: BLOCK MEDIAL BRANCH C3-C6    Chronic right-sided neck pain described primarily by arthritic features  Degenerative disc disease and spondylosis contributory  Recent neck is x-ray report unavailable but imaging reviewed through care everywhere   showing mild spondylosis  Facet loading maneuvers positive  Denies any headaches  Reasonable to proceed with conservative multimodal pain therapy  NSAIDs relatively contraindicated secondary to renal dysfunction  Right-sided C3-C6 medial branch blocks #1 provided greater than 80% relief for a period of 5 days with improved ability to participate with independent activities of daily living  Reasonable to proceed with confirmatory procedure before proceeding with radiofrequency ablation,  If successful  Plan    -right C3-C6 medial branch blocks  #2   -physical therapy for cervical facet syndrome  -physician directed home therapy exercises provided    There are risks associated with opioid medications, including dependence, addiction and tolerance  The patient understands and agrees to use these medications only as prescribed  Potential side effects of the medications include, but are not limited to, constipation, drowsiness, addiction, impaired judgment and risk of fatal overdose if not taken as prescribed  The patient was warned against driving while taking sedation medications  Sharing medications is a felony  At this point in time, the patient is showing no signs of addiction, abuse, diversion or suicidal ideation  [place] Prescription Drug Monitoring Program report was reviewed and was appropriate     Complete risks and benefits including bleeding, infection, tissue reaction, nerve injury and allergic reaction were discussed   The approach was demonstrated using models and literature was provided  Verbal and written consent was obtained  My impressions and treatment recommendations were discussed in detail with patient who verbalized understanding and had no further questions  Discharge instructions were provided  I personally saw and examined the patient and I agree with the above discussed plan of care  No orders of the defined types were placed in this encounter  History of Present Illness    Edil Jenkins is a 62 y o  female presenting with right-sided neck pain described primarily arthritic features  She presents with progressive neck pain described that is arthritic in nature for the past few years  The patient describes predominantly aching, nagging, indolent crampy, stabbing axial neck pain with very occasional radicular features of electric shock-like and shooting pain in the right upper extremity following the C6 dermatomal distribution occasionally accompanied by weakness numbness and paresthesias  The pain is 8/10 nature and is worse with overhead maneuvers as well as lateral rotation and extension of the neck  The patient has been to physical therapy, and has failed conservative medical management including Cymbalta 60 mg per day  The pain is significant source of disability and compromises independent activities of daily living as well as overall function  The patient has difficulty with sleep disturbance as well since the pain often wakes her up  The patient has been to physical therapy with limited benefit;  Right-sided C3-C6 medial branch blocks #1 provided greater than 80% relief for a period of 5 days with improved ability to participate with independent activities of daily living  I have personally reviewed and/or updated the patient's past medical history, past surgical history, family history, social history, current medications, allergies, and vital signs today  Review of Systems   Constitutional: Positive for activity change  HENT: Negative      Eyes: Negative  Respiratory: Negative  Cardiovascular: Negative  Gastrointestinal: Negative  Endocrine: Negative  Genitourinary: Negative  Musculoskeletal: Positive for arthralgias, myalgias, neck pain and neck stiffness  Skin: Negative  Allergic/Immunologic: Negative  Neurological: Negative for weakness and numbness  Hematological: Negative  Psychiatric/Behavioral: Negative  All other systems reviewed and are negative  Patient Active Problem List   Diagnosis    Cervical facet joint syndrome       Past Medical History:   Diagnosis Date    Chronic kidney disease     Depression     Diabetes mellitus ([facility] [address] )     Takes amaryl daily before breakfast    Fibromyalgia, primary     Hypertension     Osteoarthritis        Past Surgical History:   Procedure Laterality Date     SECTION      2 emergency procedures    HYSTERECTOMY      KIDNEY SURGERY Right     "gave a kidney"    NERVE BLOCK Right 2020    Procedure: C3 C4 C5 C6 MEDIAL BRANCH BLOCK #1;  Surgeon: David Higgins MD;  Location:  END[facility];  Service: Pain Management     TONSILECTOMY AND ADNOIDECTOMY      TONSILLECTOMY      WISDOM TOOTH EXTRACTION      WRIST SURGERY Right     Carpal tunnel       History reviewed  No pertinent family history  Social History     Occupational History    Not on file   Tobacco Use    Smoking status: Light Tobacco Smoker     Years: 35 00     Types: Cigarettes    Smokeless tobacco: Never Used    Tobacco comment: "only smoke when I drink"   Substance and Sexual Activity    Alcohol use:  Yes     Alcohol/week: 2 0 - 4 0 standard drinks     Types: 2 - 4 Glasses of wine per week     Frequency: 2-4 times a month     Drinks per session: 1 or 2     Binge frequency: Monthly     Comment: "who counts"    Drug use: Yes     Frequency: 1 0 times per week     Types: Marijuana     Comment: "For the pain"    Sexual activity: Not on file       Current Outpatient Medications on File Prior to Visit   Medication Sig    amLODIPine (NORVASC) 10 mg tablet Take 10 mg by mouth daily    aspirin (ECOTRIN LOW STRENGTH) 81 mg EC tablet Take 81 mg by mouth daily    cycloSPORINE (RESTASIS) 0 05 % ophthalmic emulsion Administer 1 drop to both eyes 2 (two) times a day    DULoxetine (CYMBALTA) 60 mg delayed release capsule Take 60 mg by mouth daily    famotidine (PEPCID) 20 mg tablet Take 20 mg by mouth 2 (two) times a day    fluticasone (FLONASE) 50 mcg/act nasal spray 1 spray into each nostril as needed     glimepiride (AMARYL) 1 mg tablet Take 1 mg by mouth every morning before breakfast    metoprolol tartrate (LOPRESSOR) 50 mg tablet Take 50 mg by mouth every 12 (twelve) hours    QUEtiapine (SEROquel) 100 mg tablet Take 100 mg by mouth daily at bedtime    rosuvastatin (CRESTOR) 5 mg tablet Take 5 mg by mouth daily     No current facility-administered medications on file prior to visit  Allergies   Allergen Reactions    Lisinopril-Hydrochlorothiazide Throat Swelling    Penicillins Hives         Physical Exam    /84 (BP Location: Left arm, Patient Position: Sitting, Cuff Size: Standard)   Temp 97 8 °F (36 6 °C)   Ht 5' 8" (1 727 m)   BMI 31 17 kg/m²     Constitutional: normal, well developed, well nourished, alert, in no distress and non-toxic and no overt pain behavior  and obese  Eyes: anicteric  HEENT: grossly intact  Neck: supple, symmetric, trachea midline and no masses   Pulmonary:even and unlabored  Cardiovascular:No edema or pitting edema present  Skin:Normal without rashes or lesions and well hydrated  Psychiatric:Mood and affect appropriate  Neurologic:Cranial Nerves II-XII grossly intact Sensation grossly intact; no clonus negative clayton's  Reflexes 2+ and brisk  SLR negative bilaterally  Negative Spurling's maneuver bilaterally  Musculoskeletal:normal gait  Normal heel toe and tip toe walking    Significant pain with cervical facet loading bilaterally and with lateral spine rotation right greater than left  TTP over right-sided cervical paraspinal muscles  Negative nas's test, negative gaenslen's negative SIJ loading bilaterally  Imaging    Cervical spondylosis and degenerative disc disease seen on x-ray of of cervical spine

## 2021-03-17 NOTE — H&P (VIEW-ONLY)
Assessment  1  Cervical facet joint syndrome - Right  -     Case request operating room: BLOCK MEDIAL BRANCH C3-C6; Standing  -     Case request operating room: BLOCK MEDIAL BRANCH C3-C6    Chronic right-sided neck pain described primarily by arthritic features  Degenerative disc disease and spondylosis contributory  Recent neck is x-ray report unavailable but imaging reviewed through care everywhere   showing mild spondylosis  Facet loading maneuvers positive  Denies any headaches  Reasonable to proceed with conservative multimodal pain therapy  NSAIDs relatively contraindicated secondary to renal dysfunction  Right-sided C3-C6 medial branch blocks #1 provided greater than 80% relief for a period of 5 days with improved ability to participate with independent activities of daily living  Reasonable to proceed with confirmatory procedure before proceeding with radiofrequency ablation,  If successful  Plan    -right C3-C6 medial branch blocks  #2   -physical therapy for cervical facet syndrome  -physician directed home therapy exercises provided    There are risks associated with opioid medications, including dependence, addiction and tolerance  The patient understands and agrees to use these medications only as prescribed  Potential side effects of the medications include, but are not limited to, constipation, drowsiness, addiction, impaired judgment and risk of fatal overdose if not taken as prescribed  The patient was warned against driving while taking sedation medications  Sharing medications is a felony  At this point in time, the patient is showing no signs of addiction, abuse, diversion or suicidal ideation  [place] Prescription Drug Monitoring Program report was reviewed and was appropriate     Complete risks and benefits including bleeding, infection, tissue reaction, nerve injury and allergic reaction were discussed   The approach was demonstrated using models and literature was provided  Verbal and written consent was obtained  My impressions and treatment recommendations were discussed in detail with patient who verbalized understanding and had no further questions  Discharge instructions were provided  I personally saw and examined the patient and I agree with the above discussed plan of care  No orders of the defined types were placed in this encounter  History of Present Illness    Bony Berry is a 62 y o  female presenting with right-sided neck pain described primarily arthritic features  She presents with progressive neck pain described that is arthritic in nature for the past few years  The patient describes predominantly aching, nagging, indolent crampy, stabbing axial neck pain with very occasional radicular features of electric shock-like and shooting pain in the right upper extremity following the C6 dermatomal distribution occasionally accompanied by weakness numbness and paresthesias  The pain is 8/10 nature and is worse with overhead maneuvers as well as lateral rotation and extension of the neck  The patient has been to physical therapy, and has failed conservative medical management including Cymbalta 60 mg per day  The pain is significant source of disability and compromises independent activities of daily living as well as overall function  The patient has difficulty with sleep disturbance as well since the pain often wakes her up  The patient has been to physical therapy with limited benefit;  Right-sided C3-C6 medial branch blocks #1 provided greater than 80% relief for a period of 5 days with improved ability to participate with independent activities of daily living  I have personally reviewed and/or updated the patient's past medical history, past surgical history, family history, social history, current medications, allergies, and vital signs today  Review of Systems   Constitutional: Positive for activity change  HENT: Negative      Eyes: Negative  Respiratory: Negative  Cardiovascular: Negative  Gastrointestinal: Negative  Endocrine: Negative  Genitourinary: Negative  Musculoskeletal: Positive for arthralgias, myalgias, neck pain and neck stiffness  Skin: Negative  Allergic/Immunologic: Negative  Neurological: Negative for weakness and numbness  Hematological: Negative  Psychiatric/Behavioral: Negative  All other systems reviewed and are negative  Patient Active Problem List   Diagnosis    Cervical facet joint syndrome       Past Medical History:   Diagnosis Date    Chronic kidney disease     Depression     Diabetes mellitus ([facility] [address] )     Takes amaryl daily before breakfast    Fibromyalgia, primary     Hypertension     Osteoarthritis        Past Surgical History:   Procedure Laterality Date     SECTION      2 emergency procedures    HYSTERECTOMY      KIDNEY SURGERY Right     "gave a kidney"    NERVE BLOCK Right 2020    Procedure: C3 C4 C5 C6 MEDIAL BRANCH BLOCK #1;  Surgeon: Gisel Shaikh MD;  Location: [facility];  Service: Pain Management     TONSILECTOMY AND ADNOIDECTOMY      TONSILLECTOMY      WISDOM TOOTH EXTRACTION      WRIST SURGERY Right     Carpal tunnel       History reviewed  No pertinent family history  Social History     Occupational History    Not on file   Tobacco Use    Smoking status: Light Tobacco Smoker     Years: 35 00     Types: Cigarettes    Smokeless tobacco: Never Used    Tobacco comment: "only smoke when I drink"   Substance and Sexual Activity    Alcohol use:  Yes     Alcohol/week: 2 0 - 4 0 standard drinks     Types: 2 - 4 Glasses of wine per week     Frequency: 2-4 times a month     Drinks per session: 1 or 2     Binge frequency: Monthly     Comment: "who counts"    Drug use: Yes     Frequency: 1 0 times per week     Types: Marijuana     Comment: "For the pain"    Sexual activity: Not on file       Current Outpatient Medications on File Prior to Visit   Medication Sig    amLODIPine (NORVASC) 10 mg tablet Take 10 mg by mouth daily    aspirin (ECOTRIN LOW STRENGTH) 81 mg EC tablet Take 81 mg by mouth daily    cycloSPORINE (RESTASIS) 0 05 % ophthalmic emulsion Administer 1 drop to both eyes 2 (two) times a day    DULoxetine (CYMBALTA) 60 mg delayed release capsule Take 60 mg by mouth daily    famotidine (PEPCID) 20 mg tablet Take 20 mg by mouth 2 (two) times a day    fluticasone (FLONASE) 50 mcg/act nasal spray 1 spray into each nostril as needed     glimepiride (AMARYL) 1 mg tablet Take 1 mg by mouth every morning before breakfast    metoprolol tartrate (LOPRESSOR) 50 mg tablet Take 50 mg by mouth every 12 (twelve) hours    QUEtiapine (SEROquel) 100 mg tablet Take 100 mg by mouth daily at bedtime    rosuvastatin (CRESTOR) 5 mg tablet Take 5 mg by mouth daily     No current facility-administered medications on file prior to visit  Allergies   Allergen Reactions    Lisinopril-Hydrochlorothiazide Throat Swelling    Penicillins Hives         Physical Exam    /84 (BP Location: Left arm, Patient Position: Sitting, Cuff Size: Standard)   Temp 97 8 °F (36 6 °C)   Ht 5' 8" (1 727 m)   BMI 31 17 kg/m²     Constitutional: normal, well developed, well nourished, alert, in no distress and non-toxic and no overt pain behavior  and obese  Eyes: anicteric  HEENT: grossly intact  Neck: supple, symmetric, trachea midline and no masses   Pulmonary:even and unlabored  Cardiovascular:No edema or pitting edema present  Skin:Normal without rashes or lesions and well hydrated  Psychiatric:Mood and affect appropriate  Neurologic:Cranial Nerves II-XII grossly intact Sensation grossly intact; no clonus negative clayton's  Reflexes 2+ and brisk  SLR negative bilaterally  Negative Spurling's maneuver bilaterally  Musculoskeletal:normal gait  Normal heel toe and tip toe walking    Significant pain with cervical facet loading bilaterally and with lateral spine rotation right greater than left  TTP over right-sided cervical paraspinal muscles  Negative nas's test, negative gaenslen's negative SIJ loading bilaterally  Imaging    Cervical spondylosis and degenerative disc disease seen on x-ray of of cervical spine

## 2021-03-18 RX ORDER — POLYETHYLENE GLYCOL 3350 17 G/17G
17 POWDER, FOR SOLUTION ORAL DAILY
COMMUNITY

## 2021-03-23 ENCOUNTER — HOSPITAL ENCOUNTER (OUTPATIENT)
Facility: HOSPITAL | Age: 61
Setting detail: OUTPATIENT SURGERY
Discharge: HOME | End: 2021-03-23
Attending: ANESTHESIOLOGY | Admitting: ANESTHESIOLOGY
Payer: MEDICARE

## 2021-03-23 ENCOUNTER — APPOINTMENT (OUTPATIENT)
Dept: RADIOLOGY | Facility: HOSPITAL | Age: 61
End: 2021-03-23
Payer: MEDICARE

## 2021-03-23 VITALS
TEMPERATURE: 99.1 F | DIASTOLIC BLOOD PRESSURE: 88 MMHG | HEART RATE: 74 BPM | WEIGHT: 200 LBS | OXYGEN SATURATION: 97 % | RESPIRATION RATE: 18 BRPM | SYSTOLIC BLOOD PRESSURE: 159 MMHG | BODY MASS INDEX: 30.31 KG/M2 | HEIGHT: 68 IN

## 2021-03-23 LAB — GLUCOSE SERPL-MCNC: 298 MG/DL (ref 65–140)

## 2021-03-23 PROCEDURE — 82948 REAGENT STRIP/BLOOD GLUCOSE: CPT

## 2021-03-23 PROCEDURE — 64491 INJ PARAVERT F JNT C/T 2 LEV: CPT | Performed by: ANESTHESIOLOGY

## 2021-03-23 PROCEDURE — 64492 INJ PARAVERT F JNT C/T 3 LEV: CPT | Performed by: ANESTHESIOLOGY

## 2021-03-23 PROCEDURE — 64490 INJ PARAVERT F JNT C/T 1 LEV: CPT | Performed by: ANESTHESIOLOGY

## 2021-03-23 PROCEDURE — 76000 FLUOROSCOPY <1 HR PHYS/QHP: CPT

## 2021-03-23 RX ORDER — LIDOCAINE HYDROCHLORIDE 10 MG/ML
6 INJECTION, SOLUTION EPIDURAL; INFILTRATION; INTRACAUDAL; PERINEURAL ONCE
Status: COMPLETED | OUTPATIENT
Start: 2021-03-23 | End: 2021-03-23

## 2021-03-23 RX ORDER — PAPAVERINE HCL 150 MG
10 CAPSULE, EXTENDED RELEASE ORAL ONCE
Status: DISCONTINUED | OUTPATIENT
Start: 2021-03-23 | End: 2021-03-24 | Stop reason: HOSPADM

## 2021-03-23 RX ORDER — ALPRAZOLAM 0.5 MG/1
0.5 TABLET ORAL ONCE
Status: COMPLETED | OUTPATIENT
Start: 2021-03-23 | End: 2021-03-23

## 2021-03-23 RX ORDER — BUPIVACAINE HCL/PF 2.5 MG/ML
10 VIAL (ML) INJECTION ONCE
Status: COMPLETED | OUTPATIENT
Start: 2021-03-23 | End: 2021-03-23

## 2021-03-23 RX ADMIN — ALPRAZOLAM 0.5 MG: 0.5 TABLET ORAL at 10:13

## 2021-03-23 RX ADMIN — ALPRAZOLAM 0.5 MG: 0.5 TABLET ORAL at 10:12

## 2021-03-23 NOTE — PROCEDURES
Pre-procedure Diagnosis: Cervical Facet Arthropathy  Post-procedure Diagnosis: Cervical Facet Arthropathy  Procedure Title(s):  [RIGHT C3, C4, C5, C6] medial branch nerve blocks [(Confirmatory)]  Attending Surgeon:   Frances Christina MD  Anesthesia:   Local     Indications: The patient is a 62y o  year-old female with a diagnosis of cervical facet arthropathy  The patient's history and physical exam were reviewed  The risks, benefits and alternatives to the procedure were discussed, and all questions were answered to the patient's satisfaction  The patient agreed to proceed, and written informed consent was obtained  Procedure in Detail: The patient was brought into the procedure room and placed in the prone position on the fluoroscopy table  The neck was prepped with chloraprep solution times one and draped in a sterile manner  AP fluoroscopic views were used to identify and sarah the centroid of the mid-articular pillars of the [C3, C4, C5, C6] levels on the [RIGHT] side  The skin and subcutaneous tissues in these areas were anesthetized with 1% lidocaine  22-gauge 3 5 inch spinal needles were directed toward the targeted points until bone was contacted  After negative aspiration was confirmed, 0 5ml of  0 25% bupivicaine was injected at each level  The needles were removed, and the patient's neck was cleaned  Bandages were placed over the points of needle insertion  Disposition: The patient tolerated the procedure well, and there were no apparent complications  The patient was taken to the recovery area where written discharge instructions for the procedure were given  The patient was given a pain diary to determine if the patient's pain improves following the injection  Once the diary is returned we will consider next appropriate course of treatment  Postoperative pain relief [WAS] significant      Estimated Blood Loss: None  Specimens Obtained: N/A

## 2021-03-23 NOTE — OP NOTE
OPERATIVE REPORT  PATIENT NAME: Edil Jenkins    :  1960  MRN: [id number]  Pt Location:  GI ROOM 01    SURGERY DATE: 3/23/2021    Surgeon(s) and Role:      Jad Jaimes MD - Primary    Preop Diagnosis:  Cervical facet joint syndrome [M47 812]    Post-Op Diagnosis Codes:     * Cervical facet joint syndrome [M47 812]    Procedure(s) (LRB):  C3 C4 C5 C6 MEDIAL BRANCH BLOCK #2 (Right)    Specimen(s):  * No specimens in log *    Estimated Blood Loss:   Minimal    Drains:  * No LDAs found *    Anesthesia Type:   Local    Operative Indications:  Cervical facet joint syndrome [M47 812]    Operative Findings:  Right C3, C4, C5, C6 medial branch nerve regions identified under fluoroscopic guidance    Complications:   None    Procedure and Technique:  Please see detailed procedure note     I was present for the entire procedure    Patient Disposition:  PACU     SIGNATURE: Braeden Almeida MD  DATE: 2021  TIME: 10:45 AM

## 2021-03-23 NOTE — INTERVAL H&P NOTE
H&P reviewed  After examining the patient I find no changes in the patients condition since the H&P had been written      Vitals:    03/23/21 0958   BP: 170/85   Pulse: 74   Resp: 18   Temp: 97 8 °F (36 6 °C)   SpO2: 97%

## 2021-03-23 NOTE — DISCHARGE INSTRUCTIONS
PLEASE SCHEDULE 2 WEEK FOLLOW UP BY CALLING THE [facility] [place]: [phone]      Cervical Facet Block   WHAT YOU NEED TO KNOW:   A cervical facet block is a procedure to inject medicine at the facet joints in your cervical (neck) spine  Facet joints are found at the back of each vertebrae  DISCHARGE INSTRUCTIONS:   Call your local emergency number (911 in the [address] [place] [address],[address]) if:   · You have trouble breathing or chest pain  Call your doctor if:   · You have pain in your neck, shoulder, or arm that does not go away or gets worse  · Parts of your body are numb, tingly, cool to the touch, or look blue or pale  · You have a fever  · You have chills, a cough, or feel weak and achy  · Your skin is itchy, swollen, or has a rash  · You have a headache that does not go away, even after you take medicine  · You have nausea or vomiting  · You have questions or concerns about your condition or care  Medicines: You may need any of the following:  · Antibiotics  prevent or fight an infection caused by bacteria  · Prescription pain medicine  may be given  Ask your healthcare provider how to take this medicine safely  Some prescription pain medicines contain acetaminophen  Do not take other medicines that contain acetaminophen without talking to your healthcare provider  Too much acetaminophen may cause liver damage  Prescription pain medicine may cause constipation  Ask your healthcare provider how to prevent or treat constipation  · Take your medicine as directed  Contact your healthcare provider if you think your medicine is not helping or if you have side effects  Tell him or her if you are allergic to any medicine  Keep a list of the medicines, vitamins, and herbs you take  Include the amounts, and when and why you take them  Bring the list or the pill bottles to follow-up visits   Carry your medicine list with you in case of an emergency  Self-care: Your healthcare provider will tell you when you can take pain medicine after the procedure  The following can help manage pain from your medical condition or from the procedure:  · Apply ice to the injection site  Ice can help relieve pain or swelling caused by the injection  Use an ice pack, or put crushed ice in a plastic bag  Wrap a towel around the bag before you apply it to your skin  · Return to your regular activities as directed  Your provider will give you specific instructions for activity after this procedure  He or she will tell you when it is okay to drive and do other activities  · Go to physical therapy if directed  A physical therapist teaches you exercises to help improve movement and strength, and to decrease pain  · Keep a record of your pain  Write down when you have pain  Include how severe it is and if anything makes it better or worse  Bring the record to follow-up visits  Follow up with your doctor as directed:  Write down your questions so you remember to ask them during your visits  © Copyright [address] Information is for End User's use only and may not be sold, redistributed or otherwise used for commercial purposes  All illustrations and images included in CareNotes® are the copyrighted property of A D A M , Inc  or [facility] Guido Miramontes   The above information is an  only  It is not intended as medical advice for individual conditions or treatments  Talk to your doctor, nurse or pharmacist before following any medical regimen to see if it is safe and effective for you

## 2021-03-31 ENCOUNTER — OFFICE VISIT (OUTPATIENT)
Dept: PAIN MEDICINE | Facility: CLINIC | Age: 61
End: 2021-03-31
Payer: MEDICARE

## 2021-03-31 VITALS
WEIGHT: 200 LBS | RESPIRATION RATE: 20 BRPM | HEIGHT: 68 IN | HEART RATE: 71 BPM | SYSTOLIC BLOOD PRESSURE: 132 MMHG | TEMPERATURE: 98 F | DIASTOLIC BLOOD PRESSURE: 84 MMHG | BODY MASS INDEX: 30.31 KG/M2

## 2021-03-31 DIAGNOSIS — M47.812 CERVICAL FACET JOINT SYNDROME: Primary | ICD-10-CM

## 2021-03-31 PROCEDURE — 99214 OFFICE O/P EST MOD 30 MIN: CPT | Performed by: ANESTHESIOLOGY

## 2021-03-31 RX ORDER — 0.9 % SODIUM CHLORIDE 0.9 %
10 VIAL (ML) INJECTION ONCE
Status: CANCELLED | OUTPATIENT
Start: 2021-03-31 | End: 2021-03-31

## 2021-03-31 RX ORDER — PAPAVERINE HCL 150 MG
10 CAPSULE, EXTENDED RELEASE ORAL ONCE
Status: CANCELLED | OUTPATIENT
Start: 2021-03-31 | End: 2021-03-31

## 2021-03-31 RX ORDER — BUPIVACAINE HCL/PF 2.5 MG/ML
10 VIAL (ML) INJECTION ONCE
Status: CANCELLED | OUTPATIENT
Start: 2021-03-31 | End: 2021-03-31

## 2021-03-31 NOTE — PATIENT INSTRUCTIONS
Cervical Radiofrequency Ablation   WHAT YOU NEED TO KNOW:   What do I need to know about  cervicalradiofrequency ablation? cervicalradiofrequency ablation (RFA) is a procedure used to treat facet joint pain in your  neck  Facet joints are found at the back of each vertebra  A needle electrode is used to send electrical currents to the nerves in your facet joint  The electrical currents create heat that damages the nerve so it cannot send pain signals  How do I prepare for cervical RFA? Your healthcare provider will talk to you about how to prepare for this procedure  He may tell you not to eat or drink anything after midnight on the day of your procedure  He will tell you what medicines to take or not take on the day of your procedure  What will happen during cervical RFA? · You will lie on your stomach  You will be given local anesthesia to numb the area of your  Neck where the needle electrode will be inserted  You may be given a sedative to help keep you relaxed  You may still feel pressure or pushing during the procedure, but you should not feel any pain  Your healthcare provider will use fluoroscopy (a type of x-ray) to guide the needle electrode to the nerves near your facet joint  · Your healthcare provider may touch the affected nerve to make sure the needle electrode is in the right place  You will feel tingling or pressure when he does this  He will then apply local anesthesia to the nerve to numb it  This will prevent you from feeling pain when he applies heat to the nerve  Your healthcare provider will then apply heat to the nerve using the needle electrode  He may need to apply heat to more than one nerve  He will remove the needle electrode and apply a bandage over the area  What are the risks of  cervical RFA? You may have pain, numbness, tingling, or burning in the area where the lumbar RFA was done  These normally go away within 6 weeks   The needle electrode may injure your spinal nerves  This may cause permanent arm weakness or nerve pain  CARE AGREEMENT:   You have the right to help plan your care  Learn about your health condition and how it may be treated  Discuss treatment options with your healthcare providers to decide what care you want to receive  You always have the right to refuse treatment  The above information is an  only  It is not intended as medical advice for individual conditions or treatments  Talk to your doctor, nurse or pharmacist before following any medical regimen to see if it is safe and effective for you  © Copyright [address] Information is for End User's use only and may not be sold, redistributed or otherwise used for commercial purposes   All illustrations and images included in CareNotes® are the copyrighted property of [employer]  or [address] [place] [address]

## 2021-03-31 NOTE — H&P (VIEW-ONLY)
Assessment  1  Cervical facet joint syndrome - Right    Chronic right-sided neck pain described primarily by arthritic features  Degenerative disc disease and spondylosis contributory  Recent neck is x-ray report unavailable but imaging reviewed through care everywhere   showing mild spondylosis  Facet loading maneuvers positive  Denies any headaches  Reasonable to proceed with conservative multimodal pain therapy  NSAIDs relatively contraindicated secondary to renal dysfunction  Right-sided C3-C6 medial branch blocks #1 and #2 provided greater than 80% relief for a period of 5  And 1 days respectively with improved ability to participate with independent activities of daily living  Reasonable to proceed with confirmatory procedure before proceeding with radiofrequency ablation,  If successful  Plan    -right C3-C6 medial branch radiofrequency ablation  -physical therapy for cervical facet syndrome  -physician directed home therapy exercises provided    There are risks associated with opioid medications, including dependence, addiction and tolerance  The patient understands and agrees to use these medications only as prescribed  Potential side effects of the medications include, but are not limited to, constipation, drowsiness, addiction, impaired judgment and risk of fatal overdose if not taken as prescribed  The patient was warned against driving while taking sedation medications  Sharing medications is a felony  At this point in time, the patient is showing no signs of addiction, abuse, diversion or suicidal ideation  [place] Prescription Drug Monitoring Program report was reviewed and was appropriate     Complete risks and benefits including bleeding, infection, tissue reaction, nerve injury and allergic reaction were discussed  The approach was demonstrated using models and literature was provided  Verbal and written consent was obtained      My impressions and treatment recommendations were discussed in detail with patient who verbalized understanding and had no further questions  Discharge instructions were provided  I personally saw and examined the patient and I agree with the above discussed plan of care  No orders of the defined types were placed in this encounter  History of Present Illness    Jaimee Curtis is a 62 y o  female presenting with right-sided neck pain described primarily arthritic features  She presents with progressive neck pain described that is arthritic in nature for the past few years  The patient describes predominantly aching, nagging, indolent crampy, stabbing axial neck pain with very occasional radicular features of electric shock-like and shooting pain in the right upper extremity following the C6 dermatomal distribution occasionally accompanied by weakness numbness and paresthesias  The pain is 8/10 nature and is worse with overhead maneuvers as well as lateral rotation and extension of the neck  The patient has been to physical therapy, and has failed conservative medical management including Cymbalta 60 mg per day  The pain is significant source of disability and compromises independent activities of daily living as well as overall function  The patient has difficulty with sleep disturbance as well since the pain often wakes her up  The patient has been to physical therapy with limited benefit;  Right-sided C3-C6 medial branch blocks #1 and #2 provided greater than 80% relief for a period of 5 and 1 days respectively with improved ability to participate with independent activities of daily living  I have personally reviewed and/or updated the patient's past medical history, past surgical history, family history, social history, current medications, allergies, and vital signs today  Review of Systems   Constitutional: Positive for activity change  HENT: Negative  Eyes: Negative  Respiratory: Negative  Cardiovascular: Negative  Gastrointestinal: Negative  Endocrine: Negative  Genitourinary: Negative  Musculoskeletal: Positive for arthralgias, myalgias, neck pain and neck stiffness  Skin: Negative  Allergic/Immunologic: Negative  Neurological: Negative for weakness and numbness  Hematological: Negative  Psychiatric/Behavioral: Negative  All other systems reviewed and are negative  Patient Active Problem List   Diagnosis    Cervical facet joint syndrome       Past Medical History:   Diagnosis Date    Ambulates with cane     Pt states will use intermittently when fibromyalgia is really bad   Chronic kidney disease     Pt donated one of her kidneys to her spouse  Only has one   Chronic pain disorder     Depression     Diabetes mellitus ([facility] [address] )     Takes amaryl daily before breakfast    Fibromyalgia, primary     GERD (gastroesophageal reflux disease)     Hypertension     Osteoarthritis        Past Surgical History:   Procedure Laterality Date    CATARACT EXTRACTION Bilateral      SECTION      2 emergency procedures    HYSTERECTOMY      Pt only has fallopian tube on the right    KIDNEY SURGERY Right     "gave a kidney"    NERVE BLOCK Right 2020    Procedure: C3 C4 C5 C6 MEDIAL BRANCH BLOCK #1;  Surgeon: Armand Vasquez MD;  [facility];  Service: Pain Management     NERVE BLOCK Right 3/23/2021    Procedure: C3 C4 C5 C6 MEDIAL BRANCH BLOCK #2;  Surgeon: Armand Vasquez MD;  [facility];  Service: Pain Management     TONSILECTOMY AND ADNOIDECTOMY      TONSILLECTOMY      WISDOM TOOTH EXTRACTION      WRIST SURGERY Right     Carpal tunnel       No family history on file  Social History     Occupational History    Not on file   Tobacco Use    Smoking status: Light Tobacco Smoker     Years: 35 00     Types: Cigarettes    Smokeless tobacco: Never Used    Tobacco comment: "only smoke when I drink"   Substance and Sexual Activity    Alcohol use:  Yes Alcohol/week: 2 0 - 4 0 standard drinks     Types: 2 - 4 Glasses of wine per week     Frequency: 2-4 times a month     Drinks per session: 1 or 2     Binge frequency: Monthly     Comment: "who counts"    Drug use: Yes     Frequency: 1 0 times per week     Types: Marijuana     Comment: "For the pain"    Sexual activity: Not on file     Comment: did not ask       Current Outpatient Medications on File Prior to Visit   Medication Sig    amLODIPine (NORVASC) 10 mg tablet Take 10 mg by mouth daily    aspirin (ECOTRIN LOW STRENGTH) 81 mg EC tablet Take 81 mg by mouth daily    cycloSPORINE (RESTASIS) 0 05 % ophthalmic emulsion Administer 1 drop to both eyes 2 (two) times a day    DULoxetine (CYMBALTA) 60 mg delayed release capsule Take 100 mg by mouth daily     famotidine (PEPCID) 20 mg tablet Take 20 mg by mouth 2 (two) times a day    fluticasone (FLONASE) 50 mcg/act nasal spray 2 sprays into each nostril as needed     glimepiride (AMARYL) 1 mg tablet Take 1 mg by mouth every morning before breakfast    metoprolol tartrate (LOPRESSOR) 50 mg tablet Take 50 mg by mouth every 12 (twelve) hours    polyethylene glycol (MiraLax) 17 GM/SCOOP powder Take 17 g by mouth daily    QUEtiapine (SEROquel) 100 mg tablet Take 100 mg by mouth daily at bedtime    rosuvastatin (CRESTOR) 5 mg tablet Take 5 mg by mouth daily     No current facility-administered medications on file prior to visit  Allergies   Allergen Reactions    Lisinopril-Hydrochlorothiazide Throat Swelling    Penicillins Hives    Nsaids Other (See Comments)     Pt only has one kidney- should not take NSAIDS  Physical Exam    There were no vitals taken for this visit  Constitutional: normal, well developed, well nourished, alert, in no distress and non-toxic and no overt pain behavior   and obese  Eyes: anicteric  HEENT: grossly intact  Neck: supple, symmetric, trachea midline and no masses   Pulmonary:even and unlabored  Cardiovascular:No edema or pitting edema present  Skin:Normal without rashes or lesions and well hydrated  Psychiatric:Mood and affect appropriate  Neurologic:Cranial Nerves II-XII grossly intact Sensation grossly intact; no clonus negative clayton's  Reflexes 2+ and brisk  SLR negative bilaterally  Negative Spurling's maneuver bilaterally  Musculoskeletal:normal gait  Normal heel toe and tip toe walking  Significant pain with cervical facet loading bilaterally and with lateral spine rotation right greater than left  TTP over right-sided cervical paraspinal muscles  Negative nas's test, negative gaenslen's negative SIJ loading bilaterally  Imaging    Cervical spondylosis and degenerative disc disease seen on x-ray of of cervical spine

## 2021-03-31 NOTE — PROGRESS NOTES
Assessment  1  Cervical facet joint syndrome - Right    Chronic right-sided neck pain described primarily by arthritic features  Degenerative disc disease and spondylosis contributory  Recent neck is x-ray report unavailable but imaging reviewed through care everywhere   showing mild spondylosis  Facet loading maneuvers positive  Denies any headaches  Reasonable to proceed with conservative multimodal pain therapy  NSAIDs relatively contraindicated secondary to renal dysfunction  Right-sided C3-C6 medial branch blocks #1 and #2 provided greater than 80% relief for a period of 5  And 1 days respectively with improved ability to participate with independent activities of daily living  Reasonable to proceed with confirmatory procedure before proceeding with radiofrequency ablation,  If successful  Plan    -right C3-C6 medial branch radiofrequency ablation  -physical therapy for cervical facet syndrome  -physician directed home therapy exercises provided    There are risks associated with opioid medications, including dependence, addiction and tolerance  The patient understands and agrees to use these medications only as prescribed  Potential side effects of the medications include, but are not limited to, constipation, drowsiness, addiction, impaired judgment and risk of fatal overdose if not taken as prescribed  The patient was warned against driving while taking sedation medications  Sharing medications is a felony  At this point in time, the patient is showing no signs of addiction, abuse, diversion or suicidal ideation  [place] Prescription Drug Monitoring Program report was reviewed and was appropriate     Complete risks and benefits including bleeding, infection, tissue reaction, nerve injury and allergic reaction were discussed  The approach was demonstrated using models and literature was provided  Verbal and written consent was obtained      My impressions and treatment recommendations were discussed in detail with patient who verbalized understanding and had no further questions  Discharge instructions were provided  I personally saw and examined the patient and I agree with the above discussed plan of care  No orders of the defined types were placed in this encounter  History of Present Illness    Noel Scruggs is a 62 y o  female presenting with right-sided neck pain described primarily arthritic features  She presents with progressive neck pain described that is arthritic in nature for the past few years  The patient describes predominantly aching, nagging, indolent crampy, stabbing axial neck pain with very occasional radicular features of electric shock-like and shooting pain in the right upper extremity following the C6 dermatomal distribution occasionally accompanied by weakness numbness and paresthesias  The pain is 8/10 nature and is worse with overhead maneuvers as well as lateral rotation and extension of the neck  The patient has been to physical therapy, and has failed conservative medical management including Cymbalta 60 mg per day  The pain is significant source of disability and compromises independent activities of daily living as well as overall function  The patient has difficulty with sleep disturbance as well since the pain often wakes her up  The patient has been to physical therapy with limited benefit;  Right-sided C3-C6 medial branch blocks #1 and #2 provided greater than 80% relief for a period of 5 and 1 days respectively with improved ability to participate with independent activities of daily living  I have personally reviewed and/or updated the patient's past medical history, past surgical history, family history, social history, current medications, allergies, and vital signs today  Review of Systems   Constitutional: Positive for activity change  HENT: Negative  Eyes: Negative  Respiratory: Negative  Cardiovascular: Negative  Gastrointestinal: Negative  Endocrine: Negative  Genitourinary: Negative  Musculoskeletal: Positive for arthralgias, myalgias, neck pain and neck stiffness  Skin: Negative  Allergic/Immunologic: Negative  Neurological: Negative for weakness and numbness  Hematological: Negative  Psychiatric/Behavioral: Negative  All other systems reviewed and are negative  Patient Active Problem List   Diagnosis    Cervical facet joint syndrome       Past Medical History:   Diagnosis Date    Ambulates with cane     Pt states will use intermittently when fibromyalgia is really bad   Chronic kidney disease     Pt donated one of her kidneys to her spouse  Only has one   Chronic pain disorder     Depression     Diabetes mellitus ([facility] [address] )     Takes amaryl daily before breakfast    Fibromyalgia, primary     GERD (gastroesophageal reflux disease)     Hypertension     Osteoarthritis        Past Surgical History:   Procedure Laterality Date    CATARACT EXTRACTION Bilateral      SECTION      2 emergency procedures    HYSTERECTOMY      Pt only has fallopian tube on the right    KIDNEY SURGERY Right     "gave a kidney"    NERVE BLOCK Right 2020    Procedure: C3 C4 C5 C6 MEDIAL BRANCH BLOCK #1;  Surgeon: Haja Shannon MD;  [facility];  Service: Pain Management     NERVE BLOCK Right 3/23/2021    Procedure: C3 C4 C5 C6 MEDIAL BRANCH BLOCK #2;  Surgeon: Haja Shannon MD;  [facility];  Service: Pain Management     TONSILECTOMY AND ADNOIDECTOMY      TONSILLECTOMY      WISDOM TOOTH EXTRACTION      WRIST SURGERY Right     Carpal tunnel       No family history on file  Social History     Occupational History    Not on file   Tobacco Use    Smoking status: Light Tobacco Smoker     Years: 35 00     Types: Cigarettes    Smokeless tobacco: Never Used    Tobacco comment: "only smoke when I drink"   Substance and Sexual Activity    Alcohol use:  Yes Alcohol/week: 2 0 - 4 0 standard drinks     Types: 2 - 4 Glasses of wine per week     Frequency: 2-4 times a month     Drinks per session: 1 or 2     Binge frequency: Monthly     Comment: "who counts"    Drug use: Yes     Frequency: 1 0 times per week     Types: Marijuana     Comment: "For the pain"    Sexual activity: Not on file     Comment: did not ask       Current Outpatient Medications on File Prior to Visit   Medication Sig    amLODIPine (NORVASC) 10 mg tablet Take 10 mg by mouth daily    aspirin (ECOTRIN LOW STRENGTH) 81 mg EC tablet Take 81 mg by mouth daily    cycloSPORINE (RESTASIS) 0 05 % ophthalmic emulsion Administer 1 drop to both eyes 2 (two) times a day    DULoxetine (CYMBALTA) 60 mg delayed release capsule Take 100 mg by mouth daily     famotidine (PEPCID) 20 mg tablet Take 20 mg by mouth 2 (two) times a day    fluticasone (FLONASE) 50 mcg/act nasal spray 2 sprays into each nostril as needed     glimepiride (AMARYL) 1 mg tablet Take 1 mg by mouth every morning before breakfast    metoprolol tartrate (LOPRESSOR) 50 mg tablet Take 50 mg by mouth every 12 (twelve) hours    polyethylene glycol (MiraLax) 17 GM/SCOOP powder Take 17 g by mouth daily    QUEtiapine (SEROquel) 100 mg tablet Take 100 mg by mouth daily at bedtime    rosuvastatin (CRESTOR) 5 mg tablet Take 5 mg by mouth daily     No current facility-administered medications on file prior to visit  Allergies   Allergen Reactions    Lisinopril-Hydrochlorothiazide Throat Swelling    Penicillins Hives    Nsaids Other (See Comments)     Pt only has one kidney- should not take NSAIDS  Physical Exam    There were no vitals taken for this visit  Constitutional: normal, well developed, well nourished, alert, in no distress and non-toxic and no overt pain behavior   and obese  Eyes: anicteric  HEENT: grossly intact  Neck: supple, symmetric, trachea midline and no masses   Pulmonary:even and unlabored  Cardiovascular:No edema or pitting edema present  Skin:Normal without rashes or lesions and well hydrated  Psychiatric:Mood and affect appropriate  Neurologic:Cranial Nerves II-XII grossly intact Sensation grossly intact; no clonus negative clayton's  Reflexes 2+ and brisk  SLR negative bilaterally  Negative Spurling's maneuver bilaterally  Musculoskeletal:normal gait  Normal heel toe and tip toe walking  Significant pain with cervical facet loading bilaterally and with lateral spine rotation right greater than left  TTP over right-sided cervical paraspinal muscles  Negative nas's test, negative gaenslen's negative SIJ loading bilaterally  Imaging    Cervical spondylosis and degenerative disc disease seen on x-ray of of cervical spine

## 2021-04-05 ENCOUNTER — ANESTHESIA EVENT (OUTPATIENT)
Dept: GASTROENTEROLOGY | Facility: HOSPITAL | Age: 61
End: 2021-04-05
Payer: MEDICARE

## 2021-04-05 NOTE — ANESTHESIA PREPROCEDURE EVALUATION
Procedure:  C3 C4 C5 C6 RADIO FREQUENCY ABLATION (Right Spine Cervical)    TTE: EF 50% with mild apical HK with DD, Aortic sclerosis no stenosis  Prior carotid duplex <50% stenosis b/l    Relevant Problems   CARDIO   (+) Hypertension      GI/HEPATIC   (+) GERD (gastroesophageal reflux disease)      MUSCULOSKELETAL   (+) Fibromyalgia, primary   (+) Osteoarthritis      NEURO/PSYCH   (+) Chronic pain disorder   (+) Depression   (+) Fibromyalgia, primary      Other   (+) Cervical facet joint syndrome        Physical Exam    Airway      TM Distance: >3 FB  Neck ROM: full     Dental       Cardiovascular  Cardiovascular exam normal    Pulmonary  Pulmonary exam normal     Other Findings        Anesthesia Plan  ASA Score- 3     Anesthesia Type- IV sedation with anesthesia with ASA Monitors  Additional Monitors:   Airway Plan:           Plan Factors-    Chart reviewed  EKG reviewed  Existing labs reviewed  Patient summary reviewed  Induction- intravenous  Postoperative Plan-     Informed Consent- Anesthetic plan and risks discussed with patient  I personally reviewed this patient with the CRNA  Discussed and agreed on the Anesthesia Plan with the CRNA  Karrie Nissen

## 2021-04-06 ENCOUNTER — APPOINTMENT (OUTPATIENT)
Dept: RADIOLOGY | Facility: HOSPITAL | Age: 61
End: 2021-04-06
Payer: MEDICARE

## 2021-04-06 ENCOUNTER — HOSPITAL ENCOUNTER (OUTPATIENT)
Facility: HOSPITAL | Age: 61
Setting detail: OUTPATIENT SURGERY
Discharge: HOME | End: 2021-04-06
Attending: ANESTHESIOLOGY | Admitting: ANESTHESIOLOGY
Payer: MEDICARE

## 2021-04-06 ENCOUNTER — ANESTHESIA (OUTPATIENT)
Dept: GASTROENTEROLOGY | Facility: HOSPITAL | Age: 61
End: 2021-04-06
Payer: MEDICARE

## 2021-04-06 VITALS
WEIGHT: 200 LBS | DIASTOLIC BLOOD PRESSURE: 69 MMHG | HEIGHT: 68 IN | TEMPERATURE: 98 F | BODY MASS INDEX: 30.31 KG/M2 | SYSTOLIC BLOOD PRESSURE: 127 MMHG | RESPIRATION RATE: 20 BRPM | OXYGEN SATURATION: 97 % | HEART RATE: 60 BPM

## 2021-04-06 PROBLEM — I10 ACCELERATED HYPERTENSION: Status: ACTIVE | Noted: 2021-04-06

## 2021-04-06 PROBLEM — R51.9 INTRACTABLE HEADACHE: Status: ACTIVE | Noted: 2021-04-06

## 2021-04-06 LAB — GLUCOSE SERPL-MCNC: 222 MG/DL (ref 65–140)

## 2021-04-06 PROCEDURE — 64633 DESTROY CERV/THOR FACET JNT: CPT | Performed by: ANESTHESIOLOGY

## 2021-04-06 PROCEDURE — 64634 DESTROY C/TH FACET JNT ADDL: CPT | Performed by: ANESTHESIOLOGY

## 2021-04-06 PROCEDURE — 76000 FLUOROSCOPY <1 HR PHYS/QHP: CPT

## 2021-04-06 PROCEDURE — 82948 REAGENT STRIP/BLOOD GLUCOSE: CPT

## 2021-04-06 RX ORDER — DEXAMETHASONE SODIUM PHOSPHATE 4 MG/ML
INJECTION, SOLUTION INTRA-ARTICULAR; INTRALESIONAL; INTRAMUSCULAR; INTRAVENOUS; SOFT TISSUE AS NEEDED
Status: DISCONTINUED | OUTPATIENT
Start: 2021-04-06 | End: 2021-04-06 | Stop reason: HOSPADM

## 2021-04-06 RX ORDER — ONDANSETRON 2 MG/ML
INJECTION INTRAMUSCULAR; INTRAVENOUS AS NEEDED
Status: DISCONTINUED | OUTPATIENT
Start: 2021-04-06 | End: 2021-04-06

## 2021-04-06 RX ORDER — LIDOCAINE HYDROCHLORIDE 10 MG/ML
INJECTION, SOLUTION EPIDURAL; INFILTRATION; INTRACAUDAL; PERINEURAL AS NEEDED
Status: DISCONTINUED | OUTPATIENT
Start: 2021-04-06 | End: 2021-04-06 | Stop reason: HOSPADM

## 2021-04-06 RX ORDER — DEXMEDETOMIDINE HYDROCHLORIDE 100 UG/ML
INJECTION, SOLUTION INTRAVENOUS AS NEEDED
Status: DISCONTINUED | OUTPATIENT
Start: 2021-04-06 | End: 2021-04-06

## 2021-04-06 RX ORDER — FENTANYL CITRATE/PF 50 MCG/ML
25 SYRINGE (ML) INJECTION
Status: CANCELLED | OUTPATIENT
Start: 2021-04-06

## 2021-04-06 RX ORDER — FENTANYL CITRATE 50 UG/ML
INJECTION, SOLUTION INTRAMUSCULAR; INTRAVENOUS AS NEEDED
Status: DISCONTINUED | OUTPATIENT
Start: 2021-04-06 | End: 2021-04-06

## 2021-04-06 RX ORDER — MIDAZOLAM HYDROCHLORIDE 2 MG/2ML
INJECTION, SOLUTION INTRAMUSCULAR; INTRAVENOUS AS NEEDED
Status: DISCONTINUED | OUTPATIENT
Start: 2021-04-06 | End: 2021-04-06

## 2021-04-06 RX ORDER — PROPOFOL 10 MG/ML
INJECTION, EMULSION INTRAVENOUS CONTINUOUS PRN
Status: DISCONTINUED | OUTPATIENT
Start: 2021-04-06 | End: 2021-04-06

## 2021-04-06 RX ORDER — LIDOCAINE HYDROCHLORIDE 10 MG/ML
INJECTION, SOLUTION EPIDURAL; INFILTRATION; INTRACAUDAL; PERINEURAL AS NEEDED
Status: DISCONTINUED | OUTPATIENT
Start: 2021-04-06 | End: 2021-04-06

## 2021-04-06 RX ORDER — BUPIVACAINE HYDROCHLORIDE 2.5 MG/ML
INJECTION, SOLUTION EPIDURAL; INFILTRATION; INTRACAUDAL AS NEEDED
Status: DISCONTINUED | OUTPATIENT
Start: 2021-04-06 | End: 2021-04-06 | Stop reason: HOSPADM

## 2021-04-06 RX ORDER — ONDANSETRON 2 MG/ML
4 INJECTION INTRAMUSCULAR; INTRAVENOUS ONCE AS NEEDED
Status: CANCELLED | OUTPATIENT
Start: 2021-04-06

## 2021-04-06 RX ORDER — LIDOCAINE HYDROCHLORIDE 20 MG/ML
INJECTION, SOLUTION INFILTRATION; PERINEURAL AS NEEDED
Status: DISCONTINUED | OUTPATIENT
Start: 2021-04-06 | End: 2021-04-06 | Stop reason: HOSPADM

## 2021-04-06 RX ORDER — ALBUTEROL SULFATE 2.5 MG/3ML
2.5 SOLUTION RESPIRATORY (INHALATION) ONCE AS NEEDED
Status: CANCELLED | OUTPATIENT
Start: 2021-04-06

## 2021-04-06 RX ORDER — SODIUM CHLORIDE, SODIUM LACTATE, POTASSIUM CHLORIDE, CALCIUM CHLORIDE 600; 310; 30; 20 MG/100ML; MG/100ML; MG/100ML; MG/100ML
125 INJECTION, SOLUTION INTRAVENOUS CONTINUOUS
Status: CANCELLED | OUTPATIENT
Start: 2021-04-06

## 2021-04-06 RX ORDER — SODIUM CHLORIDE, SODIUM LACTATE, POTASSIUM CHLORIDE, CALCIUM CHLORIDE 600; 310; 30; 20 MG/100ML; MG/100ML; MG/100ML; MG/100ML
INJECTION, SOLUTION INTRAVENOUS CONTINUOUS PRN
Status: DISCONTINUED | OUTPATIENT
Start: 2021-04-06 | End: 2021-04-06

## 2021-04-06 RX ORDER — ALPRAZOLAM 0.5 MG/1
0.5 TABLET ORAL ONCE
Status: COMPLETED | OUTPATIENT
Start: 2021-04-06 | End: 2021-04-06

## 2021-04-06 RX ADMIN — DEXMEDETOMIDINE HCL 8 MCG: 100 INJECTION INTRAVENOUS at 08:45

## 2021-04-06 RX ADMIN — SODIUM CHLORIDE, SODIUM LACTATE, POTASSIUM CHLORIDE, AND CALCIUM CHLORIDE: .6; .31; .03; .02 INJECTION, SOLUTION INTRAVENOUS at 08:39

## 2021-04-06 RX ADMIN — LIDOCAINE HYDROCHLORIDE 25 MG: 10 INJECTION, SOLUTION EPIDURAL; INFILTRATION; INTRACAUDAL; PERINEURAL at 08:44

## 2021-04-06 RX ADMIN — DEXMEDETOMIDINE HCL 8 MCG: 100 INJECTION INTRAVENOUS at 08:57

## 2021-04-06 RX ADMIN — DEXMEDETOMIDINE HCL 8 MCG: 100 INJECTION INTRAVENOUS at 08:52

## 2021-04-06 RX ADMIN — ALPRAZOLAM 0.5 MG: 0.5 TABLET ORAL at 08:26

## 2021-04-06 RX ADMIN — FENTANYL CITRATE 50 MCG: 50 INJECTION, SOLUTION INTRAMUSCULAR; INTRAVENOUS at 08:39

## 2021-04-06 RX ADMIN — DEXMEDETOMIDINE HCL 4 MCG: 100 INJECTION INTRAVENOUS at 08:49

## 2021-04-06 RX ADMIN — FENTANYL CITRATE 25 MCG: 50 INJECTION, SOLUTION INTRAMUSCULAR; INTRAVENOUS at 08:50

## 2021-04-06 RX ADMIN — PROPOFOL 25 MCG/KG/MIN: 10 INJECTION, EMULSION INTRAVENOUS at 08:44

## 2021-04-06 RX ADMIN — ONDANSETRON 4 MG: 2 INJECTION INTRAMUSCULAR; INTRAVENOUS at 08:49

## 2021-04-06 RX ADMIN — FENTANYL CITRATE 25 MCG: 50 INJECTION, SOLUTION INTRAMUSCULAR; INTRAVENOUS at 08:45

## 2021-04-06 RX ADMIN — MIDAZOLAM HYDROCHLORIDE 2 MG: 1 INJECTION, SOLUTION INTRAMUSCULAR; INTRAVENOUS at 08:39

## 2021-04-06 NOTE — ANESTHESIA POSTPROCEDURE EVALUATION
Post-Op Assessment Note    CV Status:  Stable    Pain management: adequate     Mental Status:  Alert and awake   Hydration Status:  Euvolemic   PONV Controlled:  Controlled   Airway Patency:  Patent      Post Op Vitals Reviewed: Yes      Staff: CRNA         No complications documented      /68 (04/06/21 0924)    Temp 98 °F (36 7 °C) (04/06/21 0924)    Pulse 59 (04/06/21 0924)   Resp 18 (04/06/21 0924)    SpO2 97 % (04/06/21 0924)

## 2021-04-06 NOTE — OP NOTE
OPERATIVE REPORT  PATIENT NAME: Cruzito Chavis    :  1960  MRN: [id number]  Pt Location:  GI ROOM 01    SURGERY DATE: 2021    Surgeon(s) and Role:      Esperanza Alvarado MD - Primary    Preop Diagnosis:  Cervical facet joint syndrome [M47 812]    Post-Op Diagnosis Codes:     * Cervical facet joint syndrome [M47 812]    Procedure(s) (LRB):  C3 C4 C5 C6 RADIO FREQUENCY ABLATION right side (Right)    Specimen(s):  * No specimens in log *    Estimated Blood Loss:   Minimal    Drains:  * No LDAs found *    Anesthesia Type:   IV Sedation with Anesthesia    Operative Indications:  Cervical facet joint syndrome [M47 812]    Operative Findings:  Right C3, C4, C5, C6 medial branch nerve regions identified under fluoroscopic guidance    Complications:   None    Procedure and Technique:  Please see detailed procedure note    I was present for the entire procedure    Patient Disposition:  PACU     SIGNATURE: Aida Vernon MD  DATE: 2021  TIME: 9:32 AM

## 2021-04-06 NOTE — DISCHARGE INSTRUCTIONS
PLEASE SCHEDULE 2 WEEK FOLLOW UP BY CALLING THE [facility] AT [facility]: [phone]      Cervical Radiofrequency Ablation   WHAT YOU NEED TO KNOW:   Cervical radiofrequency ablation (RFA) is a procedure used to treat facet joint pain in your neck  Facet joints are found at the back of each vertebra  A needle electrode is used to send electrical currents to the nerves in your facet joint  The electrical currents create heat that damages the nerve so it cannot send pain signals  DISCHARGE INSTRUCTIONS:   Seek care immediately if:   · You cannot move your arm  · You cannot control your urine or bowel movements  · You have severe pain in your neck  Contact your healthcare provider if:   · You have arm weakness  · You develop new symptoms  · You have questions or concerns about your condition or care  Medicines:   · Pain medicine  may be given  Ask how to take this medicine safely  · Take your medicine as directed  Contact your healthcare provider if you think your medicine is not helping or if you have side effects  Tell him or her if you are allergic to any medicine  Keep a list of the medicines, vitamins, and herbs you take  Include the amounts, and when and why you take them  Bring the list or the pill bottles to follow-up visits  Carry your medicine list with you in case of an emergency  Follow up with your healthcare provider as directed:  Write down your questions so you remember to ask them during your visits  Activity:  Do not drive a car or operate machinery within 24 hours after your procedure  Ask your healthcare provider about any other activities you should avoid  © Copyright [address] Information is for End User's use only and may not be sold, redistributed or otherwise used for commercial purposes   All illustrations and images included in CareNotes® are the copyrighted property of [employer]  or [facility]  The above information is an  only  It is not intended as medical advice for individual conditions or treatments  Talk to your doctor, nurse or pharmacist before following any medical regimen to see if it is safe and effective for you

## 2021-04-06 NOTE — PROCEDURES
Pre-procedure Diagnosis: Cervical Facet Arthropathy  Post-procedure Diagnosis: Cervical Facet Arthropathy  Procedure Title(s):  1  Radiofrequency ablation of [RIGHT C3, C4, C5, C6] medial branch nerves      2  Intraoperative fluoroscopy  Attending Surgeon:   Margaret Corrales MD  Anesthesia:   Local     Indications: The patient is a 62y o  year-old female with a diagnosis of cervical facet arthropathy  The patient's history and physical exam were reviewed  The patient has previously undergone diagnostic and confirmatory cervical medial branch blocks  The risks, benefits and alternatives to the procedure were discussed, and all questions were answered to the patient's satisfaction  The patient agreed to proceed, and written informed consent was obtained  Procedure in Detail: The patient was brought into the procedure room and placed in the prone position on the fluoroscopy table  The area of the cervical spine was prepped with chloraprep solution times two and draped in a sterile manner  AP fluoroscopic views were used to identify and sarah the midpoint of the articular pillars of the [C3, C4, C5, C6] levels on the [RIGHT] side  The skin and subcutaneous tissues in these areas were anesthetized with 1% lidocaine  A 20-gauge, 100mm length, 10mm active tip radiofrequency probe was advanced toward the targeted points until bone was contacted  At this point, lateral fluoroscopic views were obtained, and the needle tips were advanced to the centroid of the facets at each level  Motor stimulation was performed at 2 Hz and 1 2 volts generating a twitch in the neck muscles with no motor activity in the upper extremities  This was repeated for each level  0 5ml of 2% lidocaine was injected prior to lesioning, which was performed for 90 seconds at 80 degrees centigrade  The lesion was repeated after slight repositioning of needles under fluoroscopic guidance   Once the lesion was complete, 1 ml of a solution consisting of 3mL 0 25% bupivacaine and 1mL Dexamethasone (4mg/mL) was injected through each probe  The probes were removed with a 1% lidocaine flush  The patient's neck was cleaned, and bandages were placed at the needle insertion sites  The needles were removed, and the patient's neck was cleaned  Bandages were placed over the points of needle insertion  Disposition: The patient tolerated the procedure well, and there were no apparent complications  The patient was taken to the recovery area where written discharge instructions for the procedure were given       Estimated Blood Loss: None  Specimens Obtained: N/A

## 2021-04-06 NOTE — INTERVAL H&P NOTE
H&P reviewed  After examining the patient I find no changes in the patients condition since the H&P had been written      Vitals:    04/06/21 0807   BP: 161/64   Pulse: 68   Resp: 18   Temp: 98 2 °F (36 8 °C)   SpO2: 97%

## 2021-04-19 ENCOUNTER — OFFICE VISIT (OUTPATIENT)
Dept: PAIN MEDICINE | Facility: CLINIC | Age: 61
End: 2021-04-19
Payer: MEDICARE

## 2021-04-19 VITALS
BODY MASS INDEX: 30.31 KG/M2 | HEIGHT: 68 IN | RESPIRATION RATE: 20 BRPM | HEART RATE: 67 BPM | DIASTOLIC BLOOD PRESSURE: 64 MMHG | SYSTOLIC BLOOD PRESSURE: 128 MMHG | WEIGHT: 200 LBS | TEMPERATURE: 98 F

## 2021-04-19 DIAGNOSIS — G89.18 POST PROCEDURE DISCOMFORT: Primary | ICD-10-CM

## 2021-04-19 PROCEDURE — 99214 OFFICE O/P EST MOD 30 MIN: CPT | Performed by: ANESTHESIOLOGY

## 2021-04-19 RX ORDER — CYCLOBENZAPRINE HCL 10 MG
10 TABLET ORAL 3 TIMES DAILY PRN
Qty: 90 TABLET | Refills: 1 | Status: SHIPPED | OUTPATIENT
Start: 2021-04-19 | End: 2021-06-02

## 2021-04-19 RX ORDER — TIZANIDINE HYDROCHLORIDE 2 MG/1
2 CAPSULE, GELATIN COATED ORAL 3 TIMES DAILY PRN
Qty: 90 CAPSULE | Refills: 1 | Status: SHIPPED | OUTPATIENT
Start: 2021-04-19 | End: 2021-04-19

## 2021-04-19 NOTE — PATIENT INSTRUCTIONS
Chronic Neck Pain   WHAT YOU NEED TO KNOW:   What do I need to know about chronic neck pain? Chronic neck pain may start to build slowly over time  Neck pain is chronic if it lasts longer than 3 months  The pain may come and go, or be worse with certain movements  The pain may be only in your neck, or it may move to your arms, back, or shoulders  You may have pain that starts in another body area and moves to your neck  You may have neck pain for years  Some types of neck pain can be permanent  What causes or increases my risk for chronic neck pain? Chronic neck pain is often caused by a joint or disc problem in the neck  Any of the following can cause neck pain:  · Stenosis (narrowing) of your spinal column, or degeneration (breakdown) or inflammation of the discs in your neck    · Inflammation from a condition such as rheumatoid arthritis, polymyalgia rheumatica, or rotator cuff tendinitis    · A condition that affects neck to arm nerves, such as thoracic outlet syndrome or brachial neuritis     · A fracture of a neck bone that causes nerve damage    What other signs or symptoms may I have with chronic neck pain? The pain may be only in your neck, or it may move to your arms, back, or shoulders  You may also have pain that starts in another body area and moves to your neck  Signs and symptoms will depend on what is causing your pain  You may have any of the following:  · Neck or muscle stiffness    · Tingling or numbness in your arms, hand, or fingers    · Muscle weakness or spasms    · Less range of motion in your neck or shoulder joints    · Nausea or dizziness    How is the cause of chronic neck pain diagnosed? Your healthcare provider will ask about your symptoms and when they began  Tell him or her if you were recently in an accident or had another injury to your neck  He or she will examine your neck and shoulders   He or she may also have you move your head in certain ways to see if any position causes or relieves the pain  · Blood tests  may be used to measure the amount of inflammation or to check for signs of an infection  · X-ray or MRI  pictures may show a neck injury or medical condition  Do not enter the MRI room with anything metal  Metal can cause serious damage  Tell the healthcare provider if you have any metal in or on your body  How is chronic neck pain treated? Treatment will depend on what is causing your pain  · Medicines  may be prescribed or recommended by your healthcare provider for pain  You may need medicine to treat nerve pain or to stop muscle spasms  Medicines may also be given to reduce inflammation  Your healthcare provider may inject medicine into a nerve to block pain  Over-the-counter NSAID medicine or acetaminophen may be recommended to help treat minor pain or inflammation  · Traction  is used to relieve pressure from nerves  Your head is gently pulled up and away from your neck  This stretches muscles and ligaments and makes more room for the spine  Your healthcare provider will tell you the kind of traction that will help your neck pain  Do not use traction devices at home unless directed by your healthcare provider  · Surgery  may be needed if the pain is severe or other treatments do not work  Surgery will not help every kind of neck pain  You may need surgery to stabilize a fractured bone or to remove a tumor  Surgery may also be used to widen a narrowed spinal column or to remove a disc from between neck bones  What can I do to manage or prevent chronic neck pain? · Rest your neck as directed  Do not make sudden movements, such as turning your head quickly  Your healthcare provider may recommend you wear a cervical collar for a short time  The collar will prevent you from moving your head  This will give your neck time to heal if an injury is causing your neck pain   Ask your healthcare provider when you can return to sports or other normal daily activities  · Apply ice for 15 to 20 minutes every hour, or as directed  Use an ice pack, or put crushed ice in a plastic bag  Cover it with a towel before you apply it to your skin  Ice decreases pain and helps prevent tissue damage  · Apply heat for 20 to 30 minutes every 2 hours, or as directed  Heat helps decrease pain and muscle spasms  · Do neck exercises as directed  Neck exercises help strengthen the muscles and increase range of motion  Your healthcare provider will tell you which exercises are right for you  He or she may give you instructions, or he or she may recommend that you work with a physical therapist  Your healthcare provider or therapist can make sure you are doing the exercises correctly  · Maintain good posture  Keep your head and shoulders lifted when you sit  If you work in front of a computer, put the monitor at eye level  You should not need to look up or down to see the screen  You should also not have to lean forward to read what is on the screen  Keep your keyboard, mouse, and other computer items where you do not have to reach for them  Get up often if you work in front of a computer or sit for long periods of time  Stretch or walk around to keep your neck muscles loose  · Ask about acupuncture for pain relief  Neck pain is sometimes relieved with acupuncture  Talk to your healthcare provider before you get this treatment to make sure it is safe for you  When should I call my doctor? · You have neck pain and shooting pain down your arms or legs  · Your neck pain suddenly becomes severe  · You have neck pain along with numbness, tingling, or weakness in your arms or legs  · You have a stiff neck, a headache, and a fever  · You have new or worsening symptoms  · Your symptoms continue even after treatment  · You have questions or concerns about your condition or care  CARE AGREEMENT:   You have the right to help plan your care   Learn about your health condition and how it may be treated  Discuss treatment options with your healthcare providers to decide what care you want to receive  You always have the right to refuse treatment  The above information is an  only  It is not intended as medical advice for individual conditions or treatments  Talk to your doctor, nurse or pharmacist before following any medical regimen to see if it is safe and effective for you  © Copyright [address] Information is for End User's use only and may not be sold, redistributed or otherwise used for commercial purposes  All illustrations and images included in CareNotes® are the copyrighted property of A D A M , Inc  or [employer] [facility]      Neck Exercises   AMBULATORY CARE:   Neck exercises  help reduce neck pain, and improve neck movement and strength  Neck exercises also help prevent long-term neck problems  What you need to know about neck exercises:   · Do the exercises every day,  or as often as directed by your healthcare provider  · Move slowly, gently, and smoothly  Avoid fast or jerky motions  · Stand and sit the way your healthcare provider shows you  Good posture may reduce your neck pain  Check your posture often, even when you are not doing your neck exercises  How to perform neck exercises safely:   · Exercise position:  You may sit or stand while you do neck exercises  Face forward  Your shoulders should be straight and relaxed, with a good posture  · Head tilts, forward and back:  Gently bow your head and try to touch your chin to your chest  Your healthcare provider may tell you to push on the back of your neck to help bow your head  Raise your chin back to the starting position  Tilt your head back as far as possible so you are looking up at the ceiling  Your healthcare provider may tell you to lift your chin to help tilt your head back  Return your head to the starting position           · Head tilts, side to side:  Tilt your head, bringing your ear toward your shoulder  Then tilt your head toward the other shoulder  · Head turns:  Turn your head to look over your shoulder  Tilt your chin down and try to touch it to your shoulder  Do not raise your shoulder to your chin  Face forward again  Do the same on the other side  · Head rolls:  Slowly bring your chin toward your chest  Next, roll your head to the right  Your ear should be positioned over your shoulder  Hold this position for 5 seconds  Roll your head back toward your chest and to the left into the same position  Hold for 5 seconds  Gently roll your head back and around in a clockwise [place] 3 times  Next, move your head in the reverse direction (counterclockwise) in a [place] 3 times  Do not shrug your shoulders upwards while you do this exercise  Contact your healthcare provider if:   · Your pain does not get better, or gets worse  · You have questions or concerns about your condition, care, or exercise program     © Copyright [address] Information is for End User's use only and may not be sold, redistributed or otherwise used for commercial purposes  All illustrations and images included in CareNotes® are the copyrighted property of [employer]  or [address] [place], [place] [id number]   The above information is an  only  It is not intended as medical advice for individual conditions or treatments  Talk to your doctor, nurse or pharmacist before following any medical regimen to see if it is safe and effective for you

## 2021-04-19 NOTE — PROGRESS NOTES
Assessment  1  Post procedure discomfort  -     cyclobenzaprine (FLEXERIL) 10 mg tablet; Take 1 tablet (10 mg total) by mouth 3 (three) times a day as needed for muscle spasms    Status post 2 weeks from a right C3-C6 medial branch radiofrequency ablation  Describes symptoms of neuritis late muscle soreness in right paraspinal muscles extending to right trapezius muscle groups  Reasonable at this time to treat her pain symptomatically, continue with PT and follow up in 6 weeks    Chronic right-sided neck pain described primarily by arthritic features  Degenerative disc disease and spondylosis contributory  Recent neck is x-ray report unavailable but imaging reviewed through care everywhere   showing mild spondylosis  Facet loading maneuvers positive  Denies any headaches  Reasonable to proceed with conservative multimodal pain therapy  NSAIDs relatively contraindicated secondary to renal dysfunction  Right-sided C3-C6 medial branch blocks #1 and #2 provided greater than 80% relief for a period of 5  And 1 days respectively with improved ability to participate with independent activities of daily living  Reasonable to proceed with confirmatory procedure before proceeding with radiofrequency ablation,  If successful  Plan    -f/u 6 weeks  -flexeril 10mg TID prn pain; counseled  With regard to drowsiness and sedation in taking this medication  Advised  against using heavy machinery or driving under the influence   -physical therapy for cervical facet syndrome  -physician directed home therapy exercises provided    There are risks associated with opioid medications, including dependence, addiction and tolerance  The patient understands and agrees to use these medications only as prescribed  Potential side effects of the medications include, but are not limited to, constipation, drowsiness, addiction, impaired judgment and risk of fatal overdose if not taken as prescribed   The patient was warned against driving while taking sedation medications  Sharing medications is a felony  At this point in time, the patient is showing no signs of addiction, abuse, diversion or suicidal ideation  [place] Prescription Drug Monitoring Program report was reviewed and was appropriate     Complete risks and benefits including bleeding, infection, tissue reaction, nerve injury and allergic reaction were discussed  The approach was demonstrated using models and literature was provided  Verbal and written consent was obtained  My impressions and treatment recommendations were discussed in detail with patient who verbalized understanding and had no further questions  Discharge instructions were provided  I personally saw and examined the patient and I agree with the above discussed plan of care  New Medications Ordered This Visit   Medications    cyclobenzaprine (FLEXERIL) 10 mg tablet     Sig: Take 1 tablet (10 mg total) by mouth 3 (three) times a day as needed for muscle spasms     Dispense:  90 tablet     Refill:  1       History of Present Illness    Status post 2 weeks from a right C3-C6 medial branch radiofrequency ablation  Describes symptoms of neuritis late muscle soreness in right paraspinal muscles extending to right trapezius muscle groups  Reasonable at this time to treat her pain symptomatically, continue with PT and follow up in 6 weeks    Hans Capps is a 62 y o  female presenting with right-sided neck pain described primarily arthritic features  She presents with progressive neck pain described that is arthritic in nature for the past few years  The patient describes predominantly aching, nagging, indolent crampy, stabbing axial neck pain with very occasional radicular features of electric shock-like and shooting pain in the right upper extremity following the C6 dermatomal distribution occasionally accompanied by weakness numbness and paresthesias    The pain is 8/10 nature and is worse with overhead maneuvers as well as lateral rotation and extension of the neck  The patient has been to physical therapy, and has failed conservative medical management including Cymbalta 60 mg per day  The pain is significant source of disability and compromises independent activities of daily living as well as overall function  The patient has difficulty with sleep disturbance as well since the pain often wakes her up  The patient has been to physical therapy with limited benefit;  Right-sided C3-C6 medial branch blocks #1 and #2 provided greater than 80% relief for a period of 5 and 1 days respectively with improved ability to participate with independent activities of daily living  I have personally reviewed and/or updated the patient's past medical history, past surgical history, family history, social history, current medications, allergies, and vital signs today  Review of Systems   Constitutional: Positive for activity change  HENT: Negative  Eyes: Negative  Respiratory: Negative  Cardiovascular: Negative  Gastrointestinal: Negative  Endocrine: Negative  Genitourinary: Negative  Musculoskeletal: Positive for arthralgias, myalgias, neck pain and neck stiffness  Skin: Negative  Allergic/Immunologic: Negative  Neurological: Negative for weakness and numbness  Hematological: Negative  Psychiatric/Behavioral: Negative  All other systems reviewed and are negative  Patient Active Problem List   Diagnosis    Cervical facet joint syndrome    Depression    Hypertension    Fibromyalgia, primary    Diabetes mellitus ([facility] [address] )    Osteoarthritis    GERD (gastroesophageal reflux disease)    Chronic pain disorder       Past Medical History:   Diagnosis Date    Ambulates with cane     Pt states will use intermittently when fibromyalgia is really bad   Chronic kidney disease     Pt donated one of her kidneys to her spouse  Only has one      Chronic pain disorder     Depression  Diabetes mellitus ([facility] [address] )     Takes amaryl daily before breakfast    Fibromyalgia, primary     GERD (gastroesophageal reflux disease)     Hypertension     Osteoarthritis        Past Surgical History:   Procedure Laterality Date    CATARACT EXTRACTION Bilateral      SECTION      2 emergency procedures    HYSTERECTOMY      Pt only has fallopian tube on the right    KIDNEY SURGERY Right     "gave a kidney"    NERVE BLOCK Right 2020    Procedure: C3 C4 C5 C6 MEDIAL BRANCH BLOCK #1;  Surgeon: Efraín Mojica MD;  [facility];  Service: Pain Management     NERVE BLOCK Right 3/23/2021    Procedure: C3 C4 C5 C6 MEDIAL BRANCH BLOCK #2;  Surgeon: Efraín Mojica MD;  [facility];  Service: Pain Management     RADIOFREQUENCY ABLATION Right 2021    Procedure: C3 C4 C5 C6 RADIO FREQUENCY ABLATION right side;  Surgeon: Efraín Mojica MD;  [facility];  Service: Pain Management     TONSILECTOMY AND ADNOIDECTOMY      TONSILLECTOMY      WISDOM TOOTH EXTRACTION      WRIST SURGERY Right     Carpal tunnel       Family History   Problem Relation Age of Onset    Hypertension Mother     No Known Problems Father        Social History     Occupational History    Not on file   Tobacco Use    Smoking status: Light Tobacco Smoker     Years: 35 00     Types: Cigarettes    Smokeless tobacco: Never Used    Tobacco comment: "only smoke when I drink"   Substance and Sexual Activity    Alcohol use:  Yes     Alcohol/week: 2 0 - 4 0 standard drinks     Types: 2 - 4 Glasses of wine per week     Frequency: 2-4 times a month     Drinks per session: 1 or 2     Binge frequency: Monthly     Comment: "who counts"    Drug use: Yes     Frequency: 1 0 times per week     Types: Marijuana     Comment: "For the pain"    Sexual activity: Not on file     Comment: did not ask       Current Outpatient Medications on File Prior to Visit   Medication Sig    amLODIPine (NORVASC) 10 mg tablet Take 10 mg by mouth daily    aspirin (ECOTRIN LOW STRENGTH) 81 mg EC tablet Take 81 mg by mouth daily    cycloSPORINE (RESTASIS) 0 05 % ophthalmic emulsion Administer 1 drop to both eyes 2 (two) times a day    DULoxetine (CYMBALTA) 60 mg delayed release capsule Take 100 mg by mouth daily     famotidine (PEPCID) 20 mg tablet Take 20 mg by mouth 2 (two) times a day    fluticasone (FLONASE) 50 mcg/act nasal spray 2 sprays into each nostril as needed     glimepiride (AMARYL) 1 mg tablet Take 1 mg by mouth every morning before breakfast    metoprolol tartrate (LOPRESSOR) 50 mg tablet Take 50 mg by mouth every 12 (twelve) hours    polyethylene glycol (MiraLax) 17 GM/SCOOP powder Take 17 g by mouth daily    QUEtiapine (SEROquel) 100 mg tablet Take 100 mg by mouth daily at bedtime    rosuvastatin (CRESTOR) 5 mg tablet Take 5 mg by mouth daily     No current facility-administered medications on file prior to visit  Allergies   Allergen Reactions    Lisinopril-Hydrochlorothiazide Throat Swelling    Penicillins Hives    Nsaids Other (See Comments)     Pt only has one kidney- should not take NSAIDS  Physical Exam    /64   Pulse 67   Temp 98 °F (36 7 °C)   Resp 20   Ht 5' 8" (1 727 m)   Wt 90 7 kg (200 lb)   BMI 30 41 kg/m²     Constitutional: normal, well developed, well nourished, alert, in no distress and non-toxic and no overt pain behavior  and obese  Eyes: anicteric  HEENT: grossly intact  Neck: supple, symmetric, trachea midline and no masses   Pulmonary:even and unlabored  Cardiovascular:No edema or pitting edema present  Skin:Normal without rashes or lesions and well hydrated  Psychiatric:Mood and affect appropriate  Neurologic:Cranial Nerves II-XII grossly intact Sensation grossly intact; no clonus negative clayton's  Reflexes 2+ and brisk  SLR negative bilaterally  Negative Spurling's maneuver bilaterally  Musculoskeletal:normal gait  Normal heel toe and tip toe walking    Significant pain with cervical facet loading bilaterally and with lateral spine rotation right greater than left  TTP over right-sided cervical paraspinal muscles  Negative nas's test, negative gaenslen's negative SIJ loading bilaterally  Imaging    Cervical spondylosis and degenerative disc disease seen on x-ray of of cervical spine

## 2021-04-21 ENCOUNTER — TELEPHONE (OUTPATIENT)
Dept: PAIN MEDICINE | Facility: CLINIC | Age: 61
End: 2021-04-21

## 2021-04-21 ENCOUNTER — IMMUNIZATIONS (OUTPATIENT)
Dept: FAMILY MEDICINE CLINIC | Facility: HOSPITAL | Age: 61
End: 2021-04-21

## 2021-04-21 DIAGNOSIS — Z23 ENCOUNTER FOR IMMUNIZATION: Primary | ICD-10-CM

## 2021-04-21 PROCEDURE — 91300 SARS-COV-2 / COVID-19 MRNA VACCINE (PFIZER-BIONTECH) 30 MCG: CPT

## 2021-04-21 PROCEDURE — 0001A SARS-COV-2 / COVID-19 MRNA VACCINE (PFIZER-BIONTECH) 30 MCG: CPT

## 2021-04-21 NOTE — TELEPHONE ENCOUNTER
This is Dr Swapnil Harvey pt-    RN s/w pt and advised that flexeril and Cyclobenzaprine are the same  Pt advised to hold this medication until she hears back from our office  Please advise- Pt states she was advised not to take Flexeril due to her Renal hx  Pt is requesting a different muscle relaxer

## 2021-04-21 NOTE — TELEPHONE ENCOUNTER
Patient   [phone]  Dr Mily Puentes    Patient is calling in because at her last appt, she was speaking to the DR about meds  She said that he told her not to take the Flexeril due to her kidney diease  However she was prescribed cyclobenzaprine (FLEXERIL  She would like to if this is a substitute ?

## 2021-04-27 NOTE — TELEPHONE ENCOUNTER
Patient called returning the nurses call  Please be advise thank you          Please call patient back @ [phone]

## 2021-04-27 NOTE — TELEPHONE ENCOUNTER
Pt states her pain is over a 10 and she would like to know if there is anything else that can be given to her for pain     Pt can be reached at [phone] Unknown if ever smoked

## 2021-04-27 NOTE — TELEPHONE ENCOUNTER
Sw pt, c/o increased pain  Stated that she expected a different medication to be at her pharmacy to help her pain s/t kidney hx  Advised pt per Dr Flori Randall comment of 4/21/2021, ok to use 1/2 tab of flexeril as prescribed  Advised pt, this medication may cause drowsiness, do not drive or operate machinery until you are familiar with how this medication may affect you  Cb if se's or questions arise  Pt verbalized understanding and appreciation      KATTY

## 2021-04-27 NOTE — TELEPHONE ENCOUNTER
LMOM to cb  Provided cb number and office hours       NOTE:  Flexeril, 1/2 tab s/t renal hx  PT   HEP  X 6 weeks

## 2021-05-12 ENCOUNTER — IMMUNIZATIONS (OUTPATIENT)
Dept: FAMILY MEDICINE CLINIC | Facility: HOSPITAL | Age: 61
End: 2021-05-12

## 2021-05-12 DIAGNOSIS — Z23 ENCOUNTER FOR IMMUNIZATION: Primary | ICD-10-CM

## 2021-05-12 PROCEDURE — 0002A SARS-COV-2 / COVID-19 MRNA VACCINE (PFIZER-BIONTECH) 30 MCG: CPT

## 2021-05-12 PROCEDURE — 91300 SARS-COV-2 / COVID-19 MRNA VACCINE (PFIZER-BIONTECH) 30 MCG: CPT

## 2021-05-25 ENCOUNTER — DOCTOR'S OFFICE (OUTPATIENT)
Dept: URBAN - NONMETROPOLITAN AREA CLINIC 1 | Facility: CLINIC | Age: 61
Setting detail: OPHTHALMOLOGY
End: 2021-05-25
Payer: COMMERCIAL

## 2021-05-25 VITALS — HEIGHT: 55 IN

## 2021-05-25 DIAGNOSIS — H40.013: ICD-10-CM

## 2021-05-25 DIAGNOSIS — Z96.1: ICD-10-CM

## 2021-05-25 DIAGNOSIS — H04.121: ICD-10-CM

## 2021-05-25 DIAGNOSIS — H04.122: ICD-10-CM

## 2021-05-25 DIAGNOSIS — E11.9: ICD-10-CM

## 2021-05-25 DIAGNOSIS — H43.812: ICD-10-CM

## 2021-05-25 PROBLEM — H26.491: Status: RESOLVED | Noted: 2020-11-03 | Resolved: 2021-05-25

## 2021-05-25 PROCEDURE — 92134 CPTRZ OPH DX IMG PST SGM RTA: CPT | Performed by: OPHTHALMOLOGY

## 2021-05-25 PROCEDURE — 92014 COMPRE OPH EXAM EST PT 1/>: CPT | Performed by: OPHTHALMOLOGY

## 2021-05-25 PROCEDURE — 92250 FUNDUS PHOTOGRAPHY W/I&R: CPT | Performed by: OPHTHALMOLOGY

## 2021-05-25 ASSESSMENT — REFRACTION_CURRENTRX
OS_AXIS: 97
OS_OVR_VA: 20/
OD_OVR_VA: 20/
OS_VPRISM_DIRECTION: SV
OD_VPRISM_DIRECTION: SV
OS_CYLINDER: -1.75
OD_AXIS: 104
OD_SPHERE: -8.75
OS_SPHERE: -7.50
OD_CYLINDER: -1.50

## 2021-05-25 ASSESSMENT — REFRACTION_AUTOREFRACTION
OD_CYLINDER: -0.75
OD_AXIS: 120
OD_SPHERE: -1.00
OS_CYLINDER: -0.50
OS_SPHERE: -0.75
OS_AXIS: 84

## 2021-05-25 ASSESSMENT — PACHYMETRY
OD_CT_CORRECTION: -2
OS_CT_UM: 584
OD_CT_UM: 575
OS_CT_CORRECTION: -3

## 2021-05-25 ASSESSMENT — TONOMETRY
OD_IOP_MMHG: 20
OS_IOP_MMHG: 19

## 2021-05-25 ASSESSMENT — VISUAL ACUITY
OD_BCVA: 20/20-1
OS_BCVA: 20/60-2

## 2021-05-25 ASSESSMENT — SPHEQUIV_DERIVED
OS_SPHEQUIV: -1
OD_SPHEQUIV: -1.375

## 2021-05-25 ASSESSMENT — CONFRONTATIONAL VISUAL FIELD TEST (CVF)
OS_FINDINGS: FULL
OD_FINDINGS: FULL

## 2021-05-25 ASSESSMENT — DRY EYES - PHYSICIAN NOTES
OD_GENERALCOMMENTS: 1+ PEE
OS_GENERALCOMMENTS: 1+ PEE

## 2021-06-02 ENCOUNTER — OFFICE VISIT (OUTPATIENT)
Dept: PAIN MEDICINE | Facility: CLINIC | Age: 61
End: 2021-06-02
Payer: MEDICARE

## 2021-06-02 VITALS
BODY MASS INDEX: 30.31 KG/M2 | SYSTOLIC BLOOD PRESSURE: 118 MMHG | TEMPERATURE: 97.7 F | WEIGHT: 200 LBS | HEIGHT: 68 IN | DIASTOLIC BLOOD PRESSURE: 72 MMHG | HEART RATE: 64 BPM | RESPIRATION RATE: 20 BRPM

## 2021-06-02 DIAGNOSIS — F31.9 BIPOLAR 1 DISORDER (HCC): ICD-10-CM

## 2021-06-02 DIAGNOSIS — M54.12 CERVICAL RADICULOPATHY: ICD-10-CM

## 2021-06-02 DIAGNOSIS — M54.2 NECK PAIN: Primary | ICD-10-CM

## 2021-06-02 PROCEDURE — 99214 OFFICE O/P EST MOD 30 MIN: CPT | Performed by: ANESTHESIOLOGY

## 2021-06-02 RX ORDER — GABAPENTIN 100 MG/1
100 CAPSULE ORAL 3 TIMES DAILY
Qty: 90 CAPSULE | Refills: 0 | Status: SHIPPED | OUTPATIENT
Start: 2021-06-02 | End: 2021-07-14

## 2021-06-02 RX ORDER — DULOXETIN HYDROCHLORIDE 60 MG/1
60 CAPSULE, DELAYED RELEASE ORAL DAILY
COMMUNITY
End: 2021-06-02

## 2021-06-02 NOTE — PATIENT INSTRUCTIONS
Neck Exercises   AMBULATORY CARE:   Neck exercises  help reduce neck pain, and improve neck movement and strength  Neck exercises also help prevent long-term neck problems  What you need to know about neck exercises:   · Do the exercises every day,  or as often as directed by your healthcare provider  · Move slowly, gently, and smoothly  Avoid fast or jerky motions  · Stand and sit the way your healthcare provider shows you  Good posture may reduce your neck pain  Check your posture often, even when you are not doing your neck exercises  How to perform neck exercises safely:   · Exercise position:  You may sit or stand while you do neck exercises  Face forward  Your shoulders should be straight and relaxed, with a good posture  · Head tilts, forward and back:  Gently bow your head and try to touch your chin to your chest  Your healthcare provider may tell you to push on the back of your neck to help bow your head  Raise your chin back to the starting position  Tilt your head back as far as possible so you are looking up at the ceiling  Your healthcare provider may tell you to lift your chin to help tilt your head back  Return your head to the starting position  · Head tilts, side to side:  Tilt your head, bringing your ear toward your shoulder  Then tilt your head toward the other shoulder  · Head turns:  Turn your head to look over your shoulder  Tilt your chin down and try to touch it to your shoulder  Do not raise your shoulder to your chin  Face forward again  Do the same on the other side  · Head rolls:  Slowly bring your chin toward your chest  Next, roll your head to the right  Your ear should be positioned over your shoulder  Hold this position for 5 seconds  Roll your head back toward your chest and to the left into the same position  Hold for 5 seconds  Gently roll your head back and around in a clockwise [place] 3 times   Next, move your head in the reverse direction (counterclockwise) in a [place] 3 times  Do not shrug your shoulders upwards while you do this exercise  Contact your healthcare provider if:   · Your pain does not get better, or gets worse  · You have questions or concerns about your condition, care, or exercise program     © Copyright [address] Information is for End User's use only and may not be sold, redistributed or otherwise used for commercial purposes  All illustrations and images included in CareNotes® are the copyrighted property of [employer]  or [facility] Guido Miramontes   The above information is an  only  It is not intended as medical advice for individual conditions or treatments  Talk to your doctor, nurse or pharmacist before following any medical regimen to see if it is safe and effective for you

## 2021-06-02 NOTE — PROGRESS NOTES
Assessment  1  Neck pain - Right  -     MRI cervical spine without contrast; Future; Expected date: 06/02/2021    2  Bipolar 1 disorder (HCC) - Right    3  Cervical radiculopathy  -     gabapentin (NEURONTIN) 100 mg capsule; Take 1 capsule (100 mg total) by mouth 3 (three) times a day    Status post 2 months from a right C3-C6 medial branch radiofrequency ablation  Symptoms of neuritis and muscle soreness in right paraspinal muscles extending to right trapezius muscle groups has resolved but now describes radicular features in c4 and c5 dermatomal distributions right>left  Reasonable at this time to obtain MRI cervical spine noncontrast  to look for right-sided nerve root compression and continue to treat her pain with PT, gabapentin  If no nerve root compression is seen, she may be a candidate for Botox injection in right neck paraspinal muscle groups for cervical dystonia  Previously reported the following symptomatology:    Chronic right-sided neck pain described primarily by arthritic features  Degenerative disc disease and spondylosis contributory  Recent neck is x-ray report unavailable but imaging reviewed through care everywhere   showing mild spondylosis  Facet loading maneuvers positive  Denies any headaches  Reasonable to proceed with conservative multimodal pain therapy  NSAIDs relatively contraindicated secondary to renal dysfunction  Right-sided C3-C6 medial branch blocks #1 and #2 provided greater than 80% relief for a period of 5  And 1 days respectively with improved ability to participate with independent activities of daily living  No significant benefit from radiofrequency ablation  Plan    - MRI cervical spine noncontrast  -Gabapentin 100mg TID prn pain; counseled  With regard to drowsiness and sedation in taking this medication  Advised  against using heavy machinery or driving under the influence or taking medications with alcohol   Provided up titration calendar  -physical therapy for cervical facet syndrome  -physician directed home therapy exercises provided    There are risks associated with opioid medications, including dependence, addiction and tolerance  The patient understands and agrees to use these medications only as prescribed  Potential side effects of the medications include, but are not limited to, constipation, drowsiness, addiction, impaired judgment and risk of fatal overdose if not taken as prescribed  The patient was warned against driving while taking sedation medications  Sharing medications is a felony  At this point in time, the patient is showing no signs of addiction, abuse, diversion or suicidal ideation  [place] Prescription Drug Monitoring Program report was reviewed and was appropriate     Complete risks and benefits including bleeding, infection, tissue reaction, nerve injury and allergic reaction were discussed  The approach was demonstrated using models and literature was provided  Verbal and written consent was obtained  My impressions and treatment recommendations were discussed in detail with patient who verbalized understanding and had no further questions  Discharge instructions were provided  I personally saw and examined the patient and I agree with the above discussed plan of care  New Medications Ordered This Visit   Medications    gabapentin (NEURONTIN) 100 mg capsule     Sig: Take 1 capsule (100 mg total) by mouth 3 (three) times a day     Dispense:  90 capsule     Refill:  0       History of Present Illness    Status post 2 months from a right C3-C6 medial branch radiofrequency ablation  Symptoms of neuritis and muscle soreness in right paraspinal muscles extending to right trapezius muscle groups has resolved but now describes radicular features in c4 and c5 dermatomal distributions right>left  Kit Crowder is a 62 y o  female presenting with right-sided neck pain described primarily arthritic features    She presents with progressive neck pain described that is arthritic in nature for the past few years  The patient describes predominantly aching, nagging, indolent crampy, stabbing axial neck pain with very occasional radicular features of electric shock-like and shooting pain in the right upper extremity following the C6 dermatomal distribution occasionally accompanied by weakness numbness and paresthesias  The pain is 8/10 nature and is worse with overhead maneuvers as well as lateral rotation and extension of the neck  The patient has been to physical therapy, and has failed conservative medical management including Cymbalta 60 mg per day  The pain is significant source of disability and compromises independent activities of daily living as well as overall function  The patient has difficulty with sleep disturbance as well since the pain often wakes her up  The patient has been to physical therapy with limited benefit;  Right-sided C3-C6 medial branch blocks #1 and #2 provided greater than 80% relief for a period of 5 and 1 days respectively with improved ability to participate with independent activities of daily living  No significant benefit from subsequent radiofrequency ablation    I have personally reviewed and/or updated the patient's past medical history, past surgical history, family history, social history, current medications, allergies, and vital signs today  Review of Systems   Constitutional: Positive for activity change  HENT: Negative  Eyes: Negative  Respiratory: Negative  Cardiovascular: Negative  Gastrointestinal: Negative  Endocrine: Negative  Genitourinary: Negative  Musculoskeletal: Positive for arthralgias, myalgias, neck pain and neck stiffness  Skin: Negative  Allergic/Immunologic: Negative  Neurological: Negative for weakness and numbness  Hematological: Negative  Psychiatric/Behavioral: Negative  All other systems reviewed and are negative        Patient Active Problem List   Diagnosis    Cervical facet joint syndrome    Depression    Hypertension    Fibromyalgia, primary    Diabetes mellitus ([address] )    Osteoarthritis    GERD (gastroesophageal reflux disease)    Chronic pain disorder    Bipolar 1 disorder (HCC)    Cervical radiculopathy       Past Medical History:   Diagnosis Date    Ambulates with cane     Pt states will use intermittently when fibromyalgia is really bad   Chronic kidney disease     Pt donated one of her kidneys to her spouse  Only has one      Chronic pain disorder     Depression     Diabetes mellitus ([address] )     Takes amaryl daily before breakfast    Fibromyalgia, primary     GERD (gastroesophageal reflux disease)     Hypertension     Osteoarthritis        Past Surgical History:   Procedure Laterality Date    CATARACT EXTRACTION Bilateral      SECTION      2 emergency procedures    HYSTERECTOMY      Pt only has fallopian tube on the right    KIDNEY SURGERY Right     "gave a kidney"    NERVE BLOCK Right 2020    Procedure: C3 C4 C5 C6 MEDIAL BRANCH BLOCK #1;  Surgeon: Carl Torres MD;  Location: [facility];  Service: Pain Management     NERVE BLOCK Right 3/23/2021    Procedure: C3 C4 C5 C6 MEDIAL BRANCH BLOCK #2;  Surgeon: Carl Torres MD;  Location: [facility];  Service: Pain Management     RADIOFREQUENCY ABLATION Right 2021    Procedure: C3 C4 C5 C6 RADIO FREQUENCY ABLATION right side;  Surgeon: Carl Torres MD;  Location: [facility];  Service: Pain Management     TONSILECTOMY AND ADNOIDECTOMY      TONSILLECTOMY      WISDOM TOOTH EXTRACTION      WRIST SURGERY Right     Carpal tunnel       Family History   Problem Relation Age of Onset    Hypertension Mother     No Known Problems Father        Social History     Occupational History    Not on file   Tobacco Use    Smoking status: Light Tobacco Smoker     Years: 35 00     Types: Cigarettes    Smokeless tobacco: Never Used    Tobacco comment: "only smoke when I drink"   Substance and Sexual Activity    Alcohol use: Yes     Alcohol/week: 2 0 - 4 0 standard drinks     Types: 2 - 4 Glasses of wine per week     Frequency: 2-4 times a month     Drinks per session: 1 or 2     Binge frequency: Monthly     Comment: "who counts"    Drug use: Yes     Frequency: 1 0 times per week     Types: Marijuana     Comment: "For the pain"    Sexual activity: Not on file     Comment: did not ask       Current Outpatient Medications on File Prior to Visit   Medication Sig    amLODIPine (NORVASC) 10 mg tablet Take 10 mg by mouth daily    aspirin (ECOTRIN LOW STRENGTH) 81 mg EC tablet Take 81 mg by mouth daily    cycloSPORINE (RESTASIS) 0 05 % ophthalmic emulsion Administer 1 drop to both eyes 2 (two) times a day    DULoxetine (CYMBALTA) 60 mg delayed release capsule Take 100 mg by mouth daily     famotidine (PEPCID) 20 mg tablet Take 20 mg by mouth 2 (two) times a day    fluticasone (FLONASE) 50 mcg/act nasal spray 2 sprays into each nostril as needed     glimepiride (AMARYL) 1 mg tablet Take 1 mg by mouth every morning before breakfast    metoprolol tartrate (LOPRESSOR) 50 mg tablet Take 50 mg by mouth every 12 (twelve) hours    polyethylene glycol (MiraLax) 17 GM/SCOOP powder Take 17 g by mouth daily    QUEtiapine (SEROquel) 100 mg tablet Take 100 mg by mouth daily at bedtime    rosuvastatin (CRESTOR) 5 mg tablet Take 5 mg by mouth daily    [DISCONTINUED] cyclobenzaprine (FLEXERIL) 10 mg tablet Take 1 tablet (10 mg total) by mouth 3 (three) times a day as needed for muscle spasms     No current facility-administered medications on file prior to visit  Allergies   Allergen Reactions    Lisinopril-Hydrochlorothiazide Throat Swelling    Penicillins Hives    Nsaids Other (See Comments)     Pt only has one kidney- should not take NSAIDS           Physical Exam    /72   Pulse 64   Temp 97 7 °F (36 5 °C)   Resp 20   Ht 5' 8" (1 727 m)   Wt 90 7 kg (200 lb)   BMI 30 41 kg/m²     Constitutional: normal, well developed, well nourished, alert, in no distress and non-toxic and no overt pain behavior  and obese  Eyes: anicteric  HEENT: grossly intact  Neck: supple, symmetric, trachea midline and no masses   Pulmonary:even and unlabored  Cardiovascular:No edema or pitting edema present  Skin:Normal without rashes or lesions and well hydrated  Psychiatric:Mood and affect appropriate  Neurologic:Cranial Nerves II-XII grossly intact Sensation grossly intact; no clonus negative clayton's  Reflexes 2+ and brisk  SLR negative bilaterally  Negative Spurling's maneuver bilaterally  Musculoskeletal:normal gait  Normal heel toe and tip toe walking  Significant pain with cervical facet loading bilaterally and with lateral spine rotation right greater than left  Decreased range of motion with right-sided lateral rotation of neck  TTP over right-sided cervical paraspinal muscles  Negative nas's test, negative gaenslen's negative SIJ loading bilaterally  Imaging    Cervical spondylosis and degenerative disc disease seen on x-ray of of cervical spine

## 2021-06-02 NOTE — H&P (VIEW-ONLY)
Assessment  1  Neck pain - Right  -     MRI cervical spine without contrast; Future; Expected date: 06/02/2021    2  Bipolar 1 disorder (HCC) - Right    3  Cervical radiculopathy  -     gabapentin (NEURONTIN) 100 mg capsule; Take 1 capsule (100 mg total) by mouth 3 (three) times a day    Status post 2 months from a right C3-C6 medial branch radiofrequency ablation  Symptoms of neuritis and muscle soreness in right paraspinal muscles extending to right trapezius muscle groups has resolved but now describes radicular features in c4 and c5 dermatomal distributions right>left  Reasonable at this time to obtain MRI cervical spine noncontrast  to look for right-sided nerve root compression and continue to treat her pain with PT, gabapentin  If no nerve root compression is seen, she may be a candidate for Botox injection in right neck paraspinal muscle groups for cervical dystonia  Previously reported the following symptomatology:    Chronic right-sided neck pain described primarily by arthritic features  Degenerative disc disease and spondylosis contributory  Recent neck is x-ray report unavailable but imaging reviewed through care everywhere   showing mild spondylosis  Facet loading maneuvers positive  Denies any headaches  Reasonable to proceed with conservative multimodal pain therapy  NSAIDs relatively contraindicated secondary to renal dysfunction  Right-sided C3-C6 medial branch blocks #1 and #2 provided greater than 80% relief for a period of 5  And 1 days respectively with improved ability to participate with independent activities of daily living  No significant benefit from radiofrequency ablation  Plan    - MRI cervical spine noncontrast  -Gabapentin 100mg TID prn pain; counseled  With regard to drowsiness and sedation in taking this medication  Advised  against using heavy machinery or driving under the influence or taking medications with alcohol   Provided up titration calendar  -physical therapy for cervical facet syndrome  -physician directed home therapy exercises provided    There are risks associated with opioid medications, including dependence, addiction and tolerance  The patient understands and agrees to use these medications only as prescribed  Potential side effects of the medications include, but are not limited to, constipation, drowsiness, addiction, impaired judgment and risk of fatal overdose if not taken as prescribed  The patient was warned against driving while taking sedation medications  Sharing medications is a felony  At this point in time, the patient is showing no signs of addiction, abuse, diversion or suicidal ideation  [place] Prescription Drug Monitoring Program report was reviewed and was appropriate     Complete risks and benefits including bleeding, infection, tissue reaction, nerve injury and allergic reaction were discussed  The approach was demonstrated using models and literature was provided  Verbal and written consent was obtained  My impressions and treatment recommendations were discussed in detail with patient who verbalized understanding and had no further questions  Discharge instructions were provided  I personally saw and examined the patient and I agree with the above discussed plan of care  New Medications Ordered This Visit   Medications    gabapentin (NEURONTIN) 100 mg capsule     Sig: Take 1 capsule (100 mg total) by mouth 3 (three) times a day     Dispense:  90 capsule     Refill:  0       History of Present Illness    Status post 2 months from a right C3-C6 medial branch radiofrequency ablation  Symptoms of neuritis and muscle soreness in right paraspinal muscles extending to right trapezius muscle groups has resolved but now describes radicular features in c4 and c5 dermatomal distributions right>left  Purvi Antonio is a 62 y o  female presenting with right-sided neck pain described primarily arthritic features    She presents with progressive neck pain described that is arthritic in nature for the past few years  The patient describes predominantly aching, nagging, indolent crampy, stabbing axial neck pain with very occasional radicular features of electric shock-like and shooting pain in the right upper extremity following the C6 dermatomal distribution occasionally accompanied by weakness numbness and paresthesias  The pain is 8/10 nature and is worse with overhead maneuvers as well as lateral rotation and extension of the neck  The patient has been to physical therapy, and has failed conservative medical management including Cymbalta 60 mg per day  The pain is significant source of disability and compromises independent activities of daily living as well as overall function  The patient has difficulty with sleep disturbance as well since the pain often wakes her up  The patient has been to physical therapy with limited benefit;  Right-sided C3-C6 medial branch blocks #1 and #2 provided greater than 80% relief for a period of 5 and 1 days respectively with improved ability to participate with independent activities of daily living  No significant benefit from subsequent radiofrequency ablation    I have personally reviewed and/or updated the patient's past medical history, past surgical history, family history, social history, current medications, allergies, and vital signs today  Review of Systems   Constitutional: Positive for activity change  HENT: Negative  Eyes: Negative  Respiratory: Negative  Cardiovascular: Negative  Gastrointestinal: Negative  Endocrine: Negative  Genitourinary: Negative  Musculoskeletal: Positive for arthralgias, myalgias, neck pain and neck stiffness  Skin: Negative  Allergic/Immunologic: Negative  Neurological: Negative for weakness and numbness  Hematological: Negative  Psychiatric/Behavioral: Negative  All other systems reviewed and are negative        Patient Active Problem List   Diagnosis    Cervical facet joint syndrome    Depression    Hypertension    Fibromyalgia, primary    Diabetes mellitus ([address] )    Osteoarthritis    GERD (gastroesophageal reflux disease)    Chronic pain disorder    Bipolar 1 disorder (HCC)    Cervical radiculopathy       Past Medical History:   Diagnosis Date    Ambulates with cane     Pt states will use intermittently when fibromyalgia is really bad   Chronic kidney disease     Pt donated one of her kidneys to her spouse  Only has one      Chronic pain disorder     Depression     Diabetes mellitus ([address] )     Takes amaryl daily before breakfast    Fibromyalgia, primary     GERD (gastroesophageal reflux disease)     Hypertension     Osteoarthritis        Past Surgical History:   Procedure Laterality Date    CATARACT EXTRACTION Bilateral      SECTION      2 emergency procedures    HYSTERECTOMY      Pt only has fallopian tube on the right    KIDNEY SURGERY Right     "gave a kidney"    NERVE BLOCK Right 2020    Procedure: C3 C4 C5 C6 MEDIAL BRANCH BLOCK #1;  Surgeon: Efraín Mojica MD;  Location: [facility];  Service: Pain Management     NERVE BLOCK Right 3/23/2021    Procedure: C3 C4 C5 C6 MEDIAL BRANCH BLOCK #2;  Surgeon: Efraín Mojica MD;  Location[facility];  Service: Pain Management     RADIOFREQUENCY ABLATION Right 2021    Procedure: C3 C4 C5 C6 RADIO FREQUENCY ABLATION right side;  Surgeon: Efraín Mojica MD;  Location: [facility];  Service: Pain Management     TONSILECTOMY AND ADNOIDECTOMY      TONSILLECTOMY      WISDOM TOOTH EXTRACTION      WRIST SURGERY Right     Carpal tunnel       Family History   Problem Relation Age of Onset    Hypertension Mother     No Known Problems Father        Social History     Occupational History    Not on file   Tobacco Use    Smoking status: Light Tobacco Smoker     Years: 35 00     Types: Cigarettes    Smokeless tobacco: Never Used    Tobacco comment: "only smoke when I drink"   Substance and Sexual Activity    Alcohol use: Yes     Alcohol/week: 2 0 - 4 0 standard drinks     Types: 2 - 4 Glasses of wine per week     Frequency: 2-4 times a month     Drinks per session: 1 or 2     Binge frequency: Monthly     Comment: "who counts"    Drug use: Yes     Frequency: 1 0 times per week     Types: Marijuana     Comment: "For the pain"    Sexual activity: Not on file     Comment: did not ask       Current Outpatient Medications on File Prior to Visit   Medication Sig    amLODIPine (NORVASC) 10 mg tablet Take 10 mg by mouth daily    aspirin (ECOTRIN LOW STRENGTH) 81 mg EC tablet Take 81 mg by mouth daily    cycloSPORINE (RESTASIS) 0 05 % ophthalmic emulsion Administer 1 drop to both eyes 2 (two) times a day    DULoxetine (CYMBALTA) 60 mg delayed release capsule Take 100 mg by mouth daily     famotidine (PEPCID) 20 mg tablet Take 20 mg by mouth 2 (two) times a day    fluticasone (FLONASE) 50 mcg/act nasal spray 2 sprays into each nostril as needed     glimepiride (AMARYL) 1 mg tablet Take 1 mg by mouth every morning before breakfast    metoprolol tartrate (LOPRESSOR) 50 mg tablet Take 50 mg by mouth every 12 (twelve) hours    polyethylene glycol (MiraLax) 17 GM/SCOOP powder Take 17 g by mouth daily    QUEtiapine (SEROquel) 100 mg tablet Take 100 mg by mouth daily at bedtime    rosuvastatin (CRESTOR) 5 mg tablet Take 5 mg by mouth daily    [DISCONTINUED] cyclobenzaprine (FLEXERIL) 10 mg tablet Take 1 tablet (10 mg total) by mouth 3 (three) times a day as needed for muscle spasms     No current facility-administered medications on file prior to visit  Allergies   Allergen Reactions    Lisinopril-Hydrochlorothiazide Throat Swelling    Penicillins Hives    Nsaids Other (See Comments)     Pt only has one kidney- should not take NSAIDS           Physical Exam    /72   Pulse 64   Temp 97 7 °F (36 5 °C)   Resp 20   Ht 5' 8" (1 727 m)   Wt 90 7 kg (200 lb)   BMI 30 41 kg/m²     Constitutional: normal, well developed, well nourished, alert, in no distress and non-toxic and no overt pain behavior  and obese  Eyes: anicteric  HEENT: grossly intact  Neck: supple, symmetric, trachea midline and no masses   Pulmonary:even and unlabored  Cardiovascular:No edema or pitting edema present  Skin:Normal without rashes or lesions and well hydrated  Psychiatric:Mood and affect appropriate  Neurologic:Cranial Nerves II-XII grossly intact Sensation grossly intact; no clonus negative clayton's  Reflexes 2+ and brisk  SLR negative bilaterally  Negative Spurling's maneuver bilaterally  Musculoskeletal:normal gait  Normal heel toe and tip toe walking  Significant pain with cervical facet loading bilaterally and with lateral spine rotation right greater than left  Decreased range of motion with right-sided lateral rotation of neck  TTP over right-sided cervical paraspinal muscles  Negative nas's test, negative gaenslen's negative SIJ loading bilaterally  Imaging    Cervical spondylosis and degenerative disc disease seen on x-ray of of cervical spine

## 2021-06-08 ENCOUNTER — HOSPITAL ENCOUNTER (OUTPATIENT)
Dept: MRI IMAGING | Facility: HOSPITAL | Age: 61
Discharge: HOME | End: 2021-06-08
Attending: ANESTHESIOLOGY
Payer: MEDICARE

## 2021-06-08 DIAGNOSIS — M54.2 NECK PAIN: ICD-10-CM

## 2021-06-08 PROCEDURE — 72141 MRI NECK SPINE W/O DYE: CPT

## 2021-06-11 ENCOUNTER — TELEPHONE (OUTPATIENT)
Dept: PAIN MEDICINE | Facility: CLINIC | Age: 61
End: 2021-06-11

## 2021-06-11 ENCOUNTER — PREP FOR PROCEDURE (OUTPATIENT)
Dept: PAIN MEDICINE | Facility: CLINIC | Age: 61
End: 2021-06-11

## 2021-06-11 DIAGNOSIS — M54.12 CERVICAL RADICULOPATHY: Primary | ICD-10-CM

## 2021-06-11 NOTE — TELEPHONE ENCOUNTER
Patient is scheduled for C7 T1 ILESI on 6/15/21  Denies blood thinners  Instructions given verbally:  Nothing to eat or drink one hour prior to procedure  Wear comfortable clothing  Will require transportation  If patient becomes ill or is placed on antibiotics will call to inform  No vaccines 2 weeks pre and post op  Patient agrees to above

## 2021-06-11 NOTE — TELEPHONE ENCOUNTER
C5-C6:  There is loss of disc height and signal   There is a disc osteophyte complex with a superimposed left neural foraminal disc protrusion  Mild to moderate central canal narrowing  Moderate to severe left neural foraminal narrowing  Mild right   neural foraminal narrowing      C6-C7:  There is a disc osteophyte complex with a superimposed right neural foraminal disc protrusion  Moderate to severe central canal narrowing  Severe right neural foraminal narrowing  Moderate left neural foraminal narrowing  Findings are consistent with right-sided neck pain more proximally which is radicular in nature  Is risks, benefits and alternatives of interventional procedure thoroughly discussed with patient via phone call in addition to above MRI findings   Will schedule for interlaminar epidural steroid injection at C7-T1 to target this pain

## 2021-06-15 ENCOUNTER — APPOINTMENT (OUTPATIENT)
Dept: RADIOLOGY | Facility: HOSPITAL | Age: 61
End: 2021-06-15
Payer: MEDICARE

## 2021-06-15 ENCOUNTER — HOSPITAL ENCOUNTER (OUTPATIENT)
Facility: HOSPITAL | Age: 61
Setting detail: OUTPATIENT SURGERY
Discharge: HOME | End: 2021-06-15
Attending: ANESTHESIOLOGY | Admitting: ANESTHESIOLOGY
Payer: MEDICARE

## 2021-06-15 VITALS
OXYGEN SATURATION: 97 % | HEART RATE: 65 BPM | WEIGHT: 190 LBS | BODY MASS INDEX: 28.79 KG/M2 | HEIGHT: 68 IN | DIASTOLIC BLOOD PRESSURE: 70 MMHG | RESPIRATION RATE: 18 BRPM | TEMPERATURE: 98.3 F | SYSTOLIC BLOOD PRESSURE: 115 MMHG

## 2021-06-15 LAB — GLUCOSE SERPL-MCNC: 185 MG/DL (ref 65–140)

## 2021-06-15 PROCEDURE — 82948 REAGENT STRIP/BLOOD GLUCOSE: CPT

## 2021-06-15 PROCEDURE — 62321 NJX INTERLAMINAR CRV/THRC: CPT | Performed by: ANESTHESIOLOGY

## 2021-06-15 RX ORDER — SODIUM CHLORIDE 9 MG/ML
INJECTION INTRAVENOUS AS NEEDED
Status: DISCONTINUED | OUTPATIENT
Start: 2021-06-15 | End: 2021-06-15 | Stop reason: HOSPADM

## 2021-06-15 RX ORDER — LIDOCAINE HYDROCHLORIDE 10 MG/ML
INJECTION, SOLUTION EPIDURAL; INFILTRATION; INTRACAUDAL; PERINEURAL AS NEEDED
Status: DISCONTINUED | OUTPATIENT
Start: 2021-06-15 | End: 2021-06-15 | Stop reason: HOSPADM

## 2021-06-15 RX ORDER — ALPRAZOLAM 0.5 MG/1
0.5 TABLET ORAL ONCE
Status: COMPLETED | OUTPATIENT
Start: 2021-06-15 | End: 2021-06-15

## 2021-06-15 RX ORDER — METHYLPREDNISOLONE ACETATE 80 MG/ML
INJECTION, SUSPENSION INTRA-ARTICULAR; INTRALESIONAL; INTRAMUSCULAR; SOFT TISSUE AS NEEDED
Status: DISCONTINUED | OUTPATIENT
Start: 2021-06-15 | End: 2021-06-15 | Stop reason: HOSPADM

## 2021-06-15 RX ADMIN — ALPRAZOLAM 0.5 MG: 0.5 TABLET ORAL at 10:48

## 2021-06-15 NOTE — INTERVAL H&P NOTE
H&P reviewed  After examining the patient I find no changes in the patients condition since the H&P had been written      Vitals:    06/15/21 1043   BP: 120/73   Pulse: 66   Resp: 18   Temp: 97 8 °F (36 6 °C)   SpO2: 98%

## 2021-06-15 NOTE — DISCHARGE INSTRUCTIONS
PLEASE SCHEDULE 2 WEEK FOLLOW UP BY CALLING THE [facility] AT [place]: [phone]      Epidural Steroid Injection   AMBULATORY CARE:   What you need to know about an epidural steroid injection (YARI):  An YARI is a procedure to inject steroid medicine into the epidural space  The epidural space is between your spinal cord and vertebrae  Steroids reduce inflammation and fluid buildup in your spine that may be causing pain  You may be given pain medicine along with the steroids  How to prepare for an YARI:  Your healthcare provider will talk to you about how to prepare for your procedure  He or she will tell you what medicines to take or not take on the day of your procedure  You may need to stop taking blood thinners or other medicines several days before your procedure  You may need to adjust any diabetes medicine you take on the day of your procedure  Steroid medicine can increase your blood sugar level  Arrange for someone to drive you home when you are discharged  What will happen during an YARI:   · You will be given medicine to numb the procedure area  You will be awake for the procedure, but you will not feel pain  You may also be given medicine to help you relax  Contrast liquid will be used to help your healthcare provider see the area better  Tell the healthcare provider if you have ever had an allergic reaction to contrast liquid  · Your healthcare provider may place the needle into your neck area, middle of your back, or tailbone area  He may inject the medicine next to the nerves that are causing your pain  He may instead inject the medicine into a larger area of the epidural space  This helps the medicine spread to more nerves  Your healthcare provider will use a fluoroscope to help guide the needle to the right place  A fluoroscope is a type of x-ray   After the procedure, a bandage will be placed over the injection site to prevent infection  What will happen after an YARI:  You will have a bandage over the injection site to prevent infection  Your healthcare provider will tell you when you can bathe and any activity guidelines  You will be able to go home  Risks of an YARI:  You may have temporary or permanent nerve damage or paralysis  You may have bleeding or develop a serious infection, such as meningitis (swelling of the brain coverings)  An abscess may also develop  An abscess is a pus-filled area under the skin  You may need surgery to fix the abscess  You may have a seizure, anxiety, or trouble sleeping  If you are a man, you may have temporary erectile dysfunction (not able to have an erection)  Call your local emergency number (911 in the [facility]) if:   · You have a seizure  · You have trouble moving your legs  Seek care immediately if:   · Blood soaks through your bandage  · You have a fever or chills, severe back pain, and the procedure area is sensitive to the touch  · You cannot control when you urinate or have a bowel movement  Call your doctor if:   · You have weakness or numbness in your legs  · Your wound is red, swollen, or draining pus  · You have nausea or are vomiting  · Your face or neck is red and you feel warm  · You have more pain than you had before the procedure  · You have swelling in your hands or feet  · You have questions or concerns about your condition or care  Care for your wound as directed: You may remove the bandage before you go to bed the day of your procedure  You may take a shower, but do not take a bath for at least 24 hours  Self-care:   · Do not drive,  use machines, or do strenuous activity for 24 hours after your procedure or as directed  · Continue other treatments  as directed  Steroid injections alone will not control your pain  The injections are meant to be used with other treatments, such as physical therapy      Follow up with your doctor as directed:  Write down your questions so you remember to ask them during your visits  © Copyright [address] Information is for End User's use only and may not be sold, redistributed or otherwise used for commercial purposes  All illustrations and images included in CareNotes® are the copyrighted property of A D A M [employer]  or Irma Becker  The above information is an  only  It is not intended as medical advice for individual conditions or treatments  Talk to your doctor, nurse or pharmacist before following any medical regimen to see if it is safe and effective for you

## 2021-06-15 NOTE — OP NOTE
OPERATIVE REPORT  PATIENT NAME: Jed Thakkar    :  1960  MRN: [id number]  Pt Location:  GI ROOM 01    SURGERY DATE: 6/15/2021    Surgeon(s) and Role:      Isaiah Marrero MD - Primary    Preop Diagnosis:  Cervical radiculopathy [M54 12]    Post-Op Diagnosis Codes:     * Cervical radiculopathy [M54 12]    Procedure(s) (LRB):  C7 T1 CERVICAL EPIDURAL STEROID INJECTION (N/A)    Specimen(s):  * No specimens in log *    Estimated Blood Loss:   Minimal    Drains:  * No LDAs found *    Anesthesia Type:   Local    Operative Indications:  Cervical radiculopathy [M54 12]    Operative Findings:  C7-T1 epidurogram     Complications:   None    Procedure and Technique:  Please see detailed procedure note     I was present for the entire procedure    Patient Disposition:  PACU     SIGNATURE: Cristy Hammonds MD  DATE: Madai 15, 2021  TIME: 11:32 AM

## 2021-06-15 NOTE — PROCEDURES
Pre-procedure Diagnosis: Cervical Radiculopathy  Post-procedure Diagnosis: Cervical Radiculopathy  Procedure Title(s):  1  C7-T1 interlaminar epidural steroid injection      2  Intraoperative fluoroscopy  Attending Surgeon:   Rola Witt MD  Anesthesia:   Local     Indications: The patient is a 62y o  year-old female with a diagnosis of Cervical Radiculopathy  The patient's history and physical exam were reviewed  The risks, benefits and alternatives to the procedure were discussed, and all questions were answered to the patient's satisfaction  The patient agreed to proceed, and written informed consent was obtained  Procedure in Detail: The patient was brought into the procedure room and placed in the prone position on the fluoroscopy table  The area of the cervical spine was prepped with chlorhexidine gluconate solution times one and draped in a sterile manner  The C7-T1 interspace was identified and marked under AP fluoroscopy  The skin and subcutaneous tissues in the area were anesthetized with 1% lidocaine  A 18-gauge Tuohy epidural needle was directed toward the interspace under fluoroscopic guidance until the ligamentum flavum was engaged  The C-arm was oblique to the right to obtain a contra-lateral oblique view  From this point, a loss of resistance technique with saline was used to identify entrance of the needle into the epidural space  Once an appropriate loss was obtained, negative aspiration was confirmed, and 3 ml Omnipaque 240 contrast solution was injected  An appropriate epidurogram was noted  Then, after negative aspiration, a solution consisting of 1-mL depo-medrol (80mg/mL) and 1-mL preservative-free saline was easily injected  The needle was removed with a 1% lidocaine flush  The patient's back was cleaned and a bandage was placed over the site of needle insertion  Disposition: The patient tolerated the procedure well, and there were no apparent complications   The patient was taken to the recovery area where written discharge instructions for the procedure were given       Estimated Blood Loss: None  Specimens Obtained: N/A

## 2021-06-22 ENCOUNTER — TELEPHONE (OUTPATIENT)
Dept: PAIN MEDICINE | Facility: CLINIC | Age: 61
End: 2021-06-22

## 2021-06-22 NOTE — TELEPHONE ENCOUNTER
S/w pt, stated that she has not taken any gabapentin in 2 days and questioned if she needed it  Pt stated that she has pins and needles in her arm and she is not sure if it's coming from the surgery she had ~ 1 month ago, the procedure ~ 1 week ago or from not taking the gabapentin  Advised pt, it can take quite a long time to heal from surgery  Gabapentin can help to repair nerve cells and provide relief after neurosurgery  Also, steroids can remain in your system for weeks  It is possible that the steroids are continuing to reduce the inflammation in your neck and causing changes in your pain  Lastly, it could be from the sudden stop of gabapentin  This is  a medication that should be taken consistently and should not be stopped abruptly  Pt expressed concern re: gabapentin s/t kidney disease  Advised pt, as long as her nephrologist is aware of the gabapentin, her kidney function is being monitored and if a problem presents - it will be addressed  Pt stated that she would be willing to continue gabapentin  Questioned resuming it today  Advised pt, since only 2 days have passed, anticipate resuming her routine today would be ok w/ 1 pill at dinner and 1 pill at bedtime  Tomorrow take 1 pill am, 1 pill at dinner 1 pill at bed time and so on  Advised pt that she may have some drowsiness, do not drive or operate machinery until you are familiar with how this medication may affect you  Please cb if questions / concerns arise  The writer will make Dr Ketan Meadows aware and cb if there is anything additional  Pt verbalized understanding and appreciation

## 2021-06-22 NOTE — TELEPHONE ENCOUNTER
Pain level 0  Relief: 100%    Pt was put on gabapentin prior to procedure  She stopped the gabapentin yesterday  So she doesn't know if the procedure helped or if it was the gabapentin helping her? Should she stop gabapentin? She says if she doesn't have to take it, she would prefer to stop the Rx   # [phone]

## 2021-06-27 ENCOUNTER — TELEPHONE (OUTPATIENT)
Dept: OTHER | Facility: OTHER | Age: 61
End: 2021-06-27

## 2021-06-27 NOTE — TELEPHONE ENCOUNTER
Patient calling in to cancel appt on 6/30 at 1:45pm due to having car issues  Pt would like office to call her back to reschedule  Thank you

## 2021-07-14 ENCOUNTER — OFFICE VISIT (OUTPATIENT)
Dept: PAIN MEDICINE | Facility: CLINIC | Age: 61
End: 2021-07-14
Payer: MEDICARE

## 2021-07-14 VITALS
SYSTOLIC BLOOD PRESSURE: 112 MMHG | HEART RATE: 75 BPM | TEMPERATURE: 96 F | DIASTOLIC BLOOD PRESSURE: 88 MMHG | WEIGHT: 190 LBS | BODY MASS INDEX: 28.79 KG/M2 | RESPIRATION RATE: 20 BRPM | HEIGHT: 68 IN

## 2021-07-14 DIAGNOSIS — M54.12 CERVICAL RADICULOPATHY: Primary | ICD-10-CM

## 2021-07-14 PROCEDURE — 99214 OFFICE O/P EST MOD 30 MIN: CPT | Performed by: ANESTHESIOLOGY

## 2021-07-14 NOTE — PROGRESS NOTES
Assessment  1  Cervical radiculopathy - Right    Status post 2 months from a right C3-C6 medial branch radiofrequency ablation  Symptoms of neuritis and muscle soreness in right paraspinal muscles extending to right trapezius muscle groups has resolved but now describes radicular features in c4 and c5 dermatomal distributions right>left  Reasonable at this time to obtain MRI cervical spine shows loss of disc height and signal at C5-C6  There is a disc osteophyte complex with a superimposed left neural foraminal disc protrusion  Mild to moderate central canal narrowing  Moderate to severe left neural foraminal narrowing  Mild right neural foraminal narrowing  At C6-C7 there is a disc osteophyte complex with a superimposed right neural foraminal disc protrusion  Moderate to severe central canal narrowing  Severe right neural foraminal narrowing  Moderate left neural foraminal narrowing  Responded well with at least 50% improvement with pain and functionality with increased participation with IADLs after C7-T1 ILESI  She has increased range of motion with her neck since the procedure  Previously reported the following symptomatology:    Chronic right-sided neck pain described primarily by arthritic features  Degenerative disc disease and spondylosis contributory  Recent neck is x-ray report unavailable but imaging reviewed through care everywhere   showing mild spondylosis  Facet loading maneuvers positive  Denies any headaches  Reasonable to proceed with conservative multimodal pain therapy  NSAIDs relatively contraindicated secondary to renal dysfunction  Right-sided C3-C6 medial branch blocks #1 and #2 provided greater than 80% relief for a period of 5  And 1 days respectively with improved ability to participate with independent activities of daily living  No significant benefit from radiofrequency ablation  Plan  - RTC prn  -Gabapentin 100mg TID prn pain; patient may taper   Counseled with regard to drowsiness and sedation in taking this medication  Advised  against using heavy machinery or driving under the influence or taking medications with alcohol  Provided up titration calendar  -physical therapy for cervical facet syndrome/cervical radiculopathy  -physician directed home therapy exercises provided    There are risks associated with opioid medications, including dependence, addiction and tolerance  The patient understands and agrees to use these medications only as prescribed  Potential side effects of the medications include, but are not limited to, constipation, drowsiness, addiction, impaired judgment and risk of fatal overdose if not taken as prescribed  The patient was warned against driving while taking sedation medications  Sharing medications is a felony  At this point in time, the patient is showing no signs of addiction, abuse, diversion or suicidal ideation  [place] Prescription Drug Monitoring Program report was reviewed and was appropriate     Complete risks and benefits including bleeding, infection, tissue reaction, nerve injury and allergic reaction were discussed  The approach was demonstrated using models and literature was provided  Verbal and written consent was obtained  My impressions and treatment recommendations were discussed in detail with patient who verbalized understanding and had no further questions  Discharge instructions were provided  I personally saw and examined the patient and I agree with the above discussed plan of care  No orders of the defined types were placed in this encounter  History of Present Illness    Status post 2 months from a right C3-C6 medial branch radiofrequency ablation  Symptoms of neuritis and muscle soreness in right paraspinal muscles extending to right trapezius muscle groups has resolved but now describes radicular features in c4 and c5 dermatomal distributions right>left    Reasonable at this time to obtain MRI cervical spine shows loss of disc height and signal at C5-C6  There is a disc osteophyte complex with a superimposed left neural foraminal disc protrusion  Mild to moderate central canal narrowing  Moderate to severe left neural foraminal narrowing  Mild right neural foraminal narrowing  At C6-C7 there is a disc osteophyte complex with a superimposed right neural foraminal disc protrusion  Moderate to severe central canal narrowing  Severe right neural foraminal narrowing  Moderate left neural foraminal narrowing  Responded well with at least 50% improvement with pain and functionality with increased participation with IADLs after C7-T1 ILESI  She has increased range of motion with her neck since the procedure  Previously reported the following symptomatology:      Hodan Tavares is a 62 y o  female presenting with right-sided neck pain described primarily arthritic features  She presents with progressive neck pain described that is arthritic in nature for the past few years  The patient describes predominantly aching, nagging, indolent crampy, stabbing axial neck pain with very occasional radicular features of electric shock-like and shooting pain in the right upper extremity following the C6 dermatomal distribution occasionally accompanied by weakness numbness and paresthesias  The pain is 8/10 nature and is worse with overhead maneuvers as well as lateral rotation and extension of the neck  The patient has been to physical therapy, and has failed conservative medical management including Cymbalta 60 mg per day  The pain is significant source of disability and compromises independent activities of daily living as well as overall function  The patient has difficulty with sleep disturbance as well since the pain often wakes her up   The patient has been to physical therapy with limited benefit;  Right-sided C3-C6 medial branch blocks #1 and #2 provided greater than 80% relief for a period of 5 and 1 days respectively with improved ability to participate with independent activities of daily living  No significant benefit from subsequent radiofrequency ablation    I have personally reviewed and/or updated the patient's past medical history, past surgical history, family history, social history, current medications, allergies, and vital signs today  Review of Systems   Constitutional: Positive for activity change  HENT: Negative  Eyes: Negative  Respiratory: Negative  Cardiovascular: Negative  Gastrointestinal: Negative  Endocrine: Negative  Genitourinary: Negative  Musculoskeletal: Positive for arthralgias, myalgias, neck pain and neck stiffness  Skin: Negative  Allergic/Immunologic: Negative  Neurological: Negative for weakness and numbness  Hematological: Negative  Psychiatric/Behavioral: Negative  All other systems reviewed and are negative  Patient Active Problem List   Diagnosis    Cervical facet joint syndrome    Depression    Hypertension    Fibromyalgia, primary    Diabetes mellitus ([facility] [address] )    Osteoarthritis    GERD (gastroesophageal reflux disease)    Chronic pain disorder    Bipolar 1 disorder (HCC)    Cervical radiculopathy       Past Medical History:   Diagnosis Date    Ambulates with cane     Pt states will use intermittently when fibromyalgia is really bad   Chronic kidney disease     Pt donated one of her kidneys to her spouse  Only has one      Chronic pain disorder     Depression     Diabetes mellitus ([facility] [address] )     Takes amaryl daily before breakfast    Fibromyalgia, primary     GERD (gastroesophageal reflux disease)     Hypertension     Osteoarthritis        Past Surgical History:   Procedure Laterality Date    CATARACT EXTRACTION Bilateral      SECTION      2 emergency procedures    EPIDURAL BLOCK INJECTION N/A 6/15/2021    Procedure: C7 T1 CERVICAL EPIDURAL STEROID INJECTION;  Surgeon: Anne-Marie Osorio MD;  [facility]: [facility]; Service: Pain Management     HYSTERECTOMY      Pt only has fallopian tube on the right    KIDNEY SURGERY Right     "gave a kidney"    NERVE BLOCK Right 12/29/2020    Procedure: C3 C4 C5 C6 MEDIAL BRANCH BLOCK #1;  Surgeon: Mynor Hernandez MD;  [facility];  Service: Pain Management     NERVE BLOCK Right 3/23/2021    Procedure: C3 C4 C5 C6 MEDIAL BRANCH BLOCK #2;  Surgeon: Mynor Hernandez MD;  [facility];  Service: Pain Management     RADIOFREQUENCY ABLATION Right 4/6/2021    Procedure: C3 C4 C5 C6 RADIO FREQUENCY ABLATION right side;  Surgeon: Mynor Hernandez MD;  [facility];  Service: Pain Management     TONSILECTOMY AND ADNOIDECTOMY      TONSILLECTOMY      WISDOM TOOTH EXTRACTION      WRIST SURGERY Right     Carpal tunnel       Family History   Problem Relation Age of Onset    Hypertension Mother     No Known Problems Father        Social History     Occupational History    Not on file   Tobacco Use    Smoking status: Light Tobacco Smoker     Years: 35 00     Types: Cigarettes    Smokeless tobacco: Never Used    Tobacco comment: "only smoke when I drink"   Vaping Use    Vaping Use: Never used   Substance and Sexual Activity    Alcohol use:  Yes     Alcohol/week: 2 0 - 4 0 standard drinks     Types: 2 - 4 Glasses of wine per week     Comment: "who counts"    Drug use: Yes     Frequency: 1 0 times per week     Types: Marijuana     Comment: "For the pain"    Sexual activity: Not on file     Comment: did not ask       Current Outpatient Medications on File Prior to Visit   Medication Sig    amLODIPine (NORVASC) 10 mg tablet Take 10 mg by mouth daily    aspirin (ECOTRIN LOW STRENGTH) 81 mg EC tablet Take 81 mg by mouth daily    ASPIRIN 81 PO Take 81 mg by mouth daily    cycloSPORINE (RESTASIS) 0 05 % ophthalmic emulsion Administer 1 drop to both eyes 2 (two) times a day    DULoxetine (CYMBALTA) 60 mg delayed release capsule Take 100 mg by mouth daily     famotidine (PEPCID) 20 mg tablet Take 20 mg by mouth 2 (two) times a day    fluticasone (FLONASE) 50 mcg/act nasal spray 2 sprays into each nostril as needed     glimepiride (AMARYL) 1 mg tablet Take 1 mg by mouth every morning before breakfast    metoprolol tartrate (LOPRESSOR) 50 mg tablet Take 50 mg by mouth every 12 (twelve) hours    polyethylene glycol (MiraLax) 17 GM/SCOOP powder Take 17 g by mouth daily    QUEtiapine (SEROquel) 100 mg tablet Take 100 mg by mouth daily at bedtime    rosuvastatin (CRESTOR) 5 mg tablet Take 5 mg by mouth daily    [DISCONTINUED] gabapentin (NEURONTIN) 100 mg capsule Take 1 capsule (100 mg total) by mouth 3 (three) times a day     No current facility-administered medications on file prior to visit  Allergies   Allergen Reactions    Lisinopril-Hydrochlorothiazide Throat Swelling    Penicillins Hives    Nsaids Other (See Comments)     Pt only has one kidney- should not take NSAIDS  Physical Exam    /88   Pulse 75   Temp (!) 96 °F (35 6 °C)   Resp 20   Ht 5' 8" (1 727 m)   Wt 86 2 kg (190 lb)   BMI 28 89 kg/m²     Constitutional: normal, well developed, well nourished, alert, in no distress and non-toxic and no overt pain behavior  and obese  Eyes: anicteric  HEENT: grossly intact  Neck: supple, symmetric, trachea midline and no masses   Pulmonary:even and unlabored  Cardiovascular:No edema or pitting edema present  Skin:Normal without rashes or lesions and well hydrated  Psychiatric:Mood and affect appropriate  Neurologic:Cranial Nerves II-XII grossly intact Sensation grossly intact; no clonus negative clayton's  Reflexes 2+ and brisk  SLR negative bilaterally  Negative Spurling's maneuver bilaterally  Musculoskeletal:normal gait  Normal heel toe and tip toe walking  Significant pain with cervical facet loading bilaterally and with lateral spine rotation right greater than left  Decreased range of motion with right-sided lateral rotation of neck    TTP over right-sided cervical paraspinal muscles  Negative nas's test, negative gaenslen's negative SIJ loading bilaterally  Imaging    MRI CERVICAL SPINE WITHOUT CONTRAST     INDICATION: M54 2: Cervicalgia  Neck pain radiating into the right shoulder and down the arm      COMPARISON:  None      TECHNIQUE:  Sagittal T1, sagittal T2, sagittal inversion recovery, axial T2, axial  2D merge     IMAGE QUALITY:  Diagnostic     FINDINGS:     ALIGNMENT:  Normal alignment of the cervical spine  No compression fracture  No subluxation  No scoliosis      MARROW SIGNAL:  Scattered degenerative endplate changes  No focally suspicious marrow lesions  No bone marrow edema or compression abnormality      CERVICAL AND VISUALIZED THORACIC CORD:  Subtle foci of cord signal at the superior endplate of C6 as well as the superior endplate of C7 noted without cord expansion likely small foci of myelomalacia      PREVERTEBRAL AND PARASPINAL SOFT TISSUES:  Normal      VISUALIZED POSTERIOR FOSSA:  The visualized posterior fossa demonstrates no abnormal signal      CERVICAL DISC SPACES:     C2-C3:  Normal      C3-C4:  There is a disc osteophyte complex with a superimposed left neural foraminal disc protrusion  Moderate left neural foraminal narrowing  Mild to moderate central canal narrowing  Mild right neural foraminal narrowing      C4-C5:  Small right paracentral distribution  Mild central canal narrowing  Mild right neural foraminal narrowing  Left neural foramen patent      C5-C6:  There is loss of disc height and signal   There is a disc osteophyte complex with a superimposed left neural foraminal disc protrusion  Mild to moderate central canal narrowing  Moderate to severe left neural foraminal narrowing  Mild right   neural foraminal narrowing      C6-C7:  There is a disc osteophyte complex with a superimposed right neural foraminal disc protrusion  Moderate to severe central canal narrowing    Severe right neural foraminal narrowing  Moderate left neural foraminal narrowing      C7-T1:  Right neural foraminal disc protrusion  Moderate right neural foraminal narrowing  Central canal minimally narrowed  Mild left neural foraminal narrowing      UPPER THORACIC DISC SPACES:  Normal      IMPRESSION:     1   Multilevel spondylosis most pronounced at C5-C6 and C6-C7   2   Subtle foci of cord signal abnormality in the lower cervical cord at the superior endplates of C6 and C7 without cord expansion, likely small foci of myelomalacia        Cervical spondylosis and degenerative disc disease seen on x-ray of of cervical spine

## 2021-07-14 NOTE — PATIENT INSTRUCTIONS
Neck Exercises   AMBULATORY CARE:   Neck exercises  help reduce neck pain, and improve neck movement and strength  Neck exercises also help prevent long-term neck problems  What you need to know about neck exercises:   · Do the exercises every day,  or as often as directed by your healthcare provider  · Move slowly, gently, and smoothly  Avoid fast or jerky motions  · Stand and sit the way your healthcare provider shows you  Good posture may reduce your neck pain  Check your posture often, even when you are not doing your neck exercises  How to perform neck exercises safely:   · Exercise position:  You may sit or stand while you do neck exercises  Face forward  Your shoulders should be straight and relaxed, with a good posture  · Head tilts, forward and back:  Gently bow your head and try to touch your chin to your chest  Your healthcare provider may tell you to push on the back of your neck to help bow your head  Raise your chin back to the starting position  Tilt your head back as far as possible so you are looking up at the ceiling  Your healthcare provider may tell you to lift your chin to help tilt your head back  Return your head to the starting position  · Head tilts, side to side:  Tilt your head, bringing your ear toward your shoulder  Then tilt your head toward the other shoulder  · Head turns:  Turn your head to look over your shoulder  Tilt your chin down and try to touch it to your shoulder  Do not raise your shoulder to your chin  Face forward again  Do the same on the other side  · Head rolls:  Slowly bring your chin toward your chest  Next, roll your head to the right  Your ear should be positioned over your shoulder  Hold this position for 5 seconds  Roll your head back toward your chest and to the left into the same position  Hold for 5 seconds  Gently roll your head back and around in a clockwise [place] 3 times   Next, move your head in the reverse direction (counterclockwise) in a [place] 3 times  Do not shrug your shoulders upwards while you do this exercise  Contact your healthcare provider if:   · Your pain does not get better, or gets worse  · You have questions or concerns about your condition, care, or exercise program     © Copyright [address] Information is for End User's use only and may not be sold, redistributed or otherwise used for commercial purposes  All illustrations and images included in CareNotes® are the copyrighted property of [employer]  or [facility] Guido Miramontes   The above information is an  only  It is not intended as medical advice for individual conditions or treatments  Talk to your doctor, nurse or pharmacist before following any medical regimen to see if it is safe and effective for you

## 2021-07-23 ENCOUNTER — TELEPHONE (OUTPATIENT)
Dept: PAIN MEDICINE | Facility: CLINIC | Age: 61
End: 2021-07-23

## 2021-07-23 DIAGNOSIS — M54.12 CERVICAL RADICULOPATHY: Primary | ICD-10-CM

## 2021-07-23 RX ORDER — PREGABALIN 75 MG/1
75 CAPSULE ORAL 2 TIMES DAILY
Qty: 60 CAPSULE | Refills: 0 | Status: SHIPPED | OUTPATIENT
Start: 2021-07-23 | End: 2021-09-15

## 2021-07-23 NOTE — TELEPHONE ENCOUNTER
Patient states she's experiencing cervical pain that has worsen  She would like to discuss her status with a nurse   Please advise, cammie    Call back# [phone]

## 2021-07-23 NOTE — TELEPHONE ENCOUNTER
Will rx lyrica 75mg BID (renally dosed for creatinine clearance 56); will schedule repeat ILESI at C7-T1; patient to follow up in 30 days for refill or schedule for f/u 2 weeks post procedure

## 2021-07-23 NOTE — TELEPHONE ENCOUNTER
S/w pt  Last ov 7/14  Pt states she weaned off gabapentin as instructed and the neck pain has worsened  Pt states she was to wean off due to kidney disease  Pt had MILAGRO in June and states relief lasted only 2 weeks  Also states PT exercises do not help  Asking for next step in plan of care  Please advise

## 2021-07-23 NOTE — TELEPHONE ENCOUNTER
S/w pt and advised lyrica at pharmacy  Pt aware to give time for benefit and will call if unable to tolerate

## 2021-07-23 NOTE — TELEPHONE ENCOUNTER
Patient would like to try the Lyrica first  Did make 30 day f/u appointment  Will call if she wants to schedule procedure

## 2021-08-25 ENCOUNTER — TELEPHONE (OUTPATIENT)
Dept: PAIN MEDICINE | Facility: CLINIC | Age: 61
End: 2021-08-25

## 2021-08-25 NOTE — TELEPHONE ENCOUNTER
Patient called to cancel her appt today 8/25 with physician suffering from a lot of leg pain, her fibromyalgia she states is acting up  She rescheduled for 9/15 at 2:30 pm     She also wanted to make you aware she stopped taking Pregablin it made her feel funny

## 2021-09-15 ENCOUNTER — OFFICE VISIT (OUTPATIENT)
Dept: PAIN MEDICINE | Facility: CLINIC | Age: 61
End: 2021-09-15
Payer: MEDICARE

## 2021-09-15 VITALS
HEART RATE: 76 BPM | DIASTOLIC BLOOD PRESSURE: 78 MMHG | HEIGHT: 68 IN | RESPIRATION RATE: 20 BRPM | WEIGHT: 190 LBS | BODY MASS INDEX: 28.79 KG/M2 | TEMPERATURE: 96.8 F | SYSTOLIC BLOOD PRESSURE: 138 MMHG

## 2021-09-15 DIAGNOSIS — M48.02 CERVICAL SPINAL STENOSIS: Primary | ICD-10-CM

## 2021-09-15 DIAGNOSIS — N18.31 STAGE 3A CHRONIC KIDNEY DISEASE (HCC): ICD-10-CM

## 2021-09-15 PROCEDURE — 99214 OFFICE O/P EST MOD 30 MIN: CPT | Performed by: ANESTHESIOLOGY

## 2021-09-15 NOTE — PROGRESS NOTES
Assessment  1  Cervical spinal stenosis  -     Ambulatory referral to Neurosurgery; Future    2  Stage 3a chronic kidney disease ([address] )    MRI cervical spine shows loss of disc height and signal at C5-C6  There is a disc osteophyte complex with a superimposed left neural foraminal disc protrusion  Mild to moderate central canal narrowing  Moderate to severe left neural foraminal narrowing  Mild right neural foraminal narrowing  At C6-C7 there is a disc osteophyte complex with a superimposed right neural foraminal disc protrusion  Moderate to severe central canal narrowing  Severe right neural foraminal narrowing  Moderate left neural foraminal narrowing  Responded well with at least 50% improvement with pain and functionality with increased participation with IADLs after C7-T1 ILESI  She has increased range of motion with her neck since the procedure that was performed 6/15/21  In the past month she has developed increasing gait instability and falls; denies bowel/bladder abnormalities  Endorses stiffness and decreased  strength/dropping things at times  +clayton's; will refer to [address] [place] [address] for possible surgical intervention given symptoms of myelopathy  Previously reported the following symptomatology:    Chronic right-sided neck pain described primarily by arthritic features  Degenerative disc disease and spondylosis contributory  Recent neck is x-ray report unavailable but imaging reviewed through care everywhere   showing mild spondylosis  Facet loading maneuvers positive  Denies any headaches  Reasonable to proceed with conservative multimodal pain therapy  NSAIDs relatively contraindicated secondary to renal dysfunction  Right-sided C3-C6 medial branch blocks #1 and #2 provided greater than 80% relief for a period of 5  And 1 days respectively with improved ability to participate with independent activities of daily living  No significant benefit from radiofrequency ablation      Plan  - Referral to [address] [place] [address] for surgical intervention given moderate to severe spinal stenosis seen at c5-6 and c6-7 with myelopathic signs on physical exam   - Counseled patient with regard to red flag signs, she will contact our office or seek emergency care should sudden weakness, bowel or bladder abnormality occur  There are risks associated with opioid medications, including dependence, addiction and tolerance  The patient understands and agrees to use these medications only as prescribed  Potential side effects of the medications include, but are not limited to, constipation, drowsiness, addiction, impaired judgment and risk of fatal overdose if not taken as prescribed  The patient was warned against driving while taking sedation medications  Sharing medications is a felony  At this point in time, the patient is showing no signs of addiction, abuse, diversion or suicidal ideation  [place] Prescription Drug Monitoring Program report was reviewed and was appropriate     Complete risks and benefits including bleeding, infection, tissue reaction, nerve injury and allergic reaction were discussed  The approach was demonstrated using models and literature was provided  Verbal and written consent was obtained  My impressions and treatment recommendations were discussed in detail with patient who verbalized understanding and had no further questions  Discharge instructions were provided  I personally saw and examined the patient and I agree with the above discussed plan of care  No orders of the defined types were placed in this encounter  History of Present Illness    MRI cervical spine shows loss of disc height and signal at C5-C6  There is a disc osteophyte complex with a superimposed left neural foraminal disc protrusion  Mild to moderate central canal narrowing  Moderate to severe left neural foraminal narrowing  Mild right neural foraminal narrowing   At C6-C7 there is a disc osteophyte complex with a superimposed right neural foraminal disc protrusion  Moderate to severe central canal narrowing  Severe right neural foraminal narrowing  Moderate left neural foraminal narrowing  Responded well with at least 50% improvement with pain and functionality with increased participation with IADLs after C7-T1 ILESI  She has increased range of motion with her neck since the procedure that was performed 6/15/21  In the past month she has developed increasing gait instability and falls; denies bowel/bladder abnormalities  Endorses stiffness and decreased  strength/dropping things at times  +clayton's; will refer to [address] [place] [address] for possible surgical intervention given symptoms of myelopathy  Previously reported the following symptomatology:      Fletcher Carson is a 62 y o  female presenting with right-sided neck pain described primarily arthritic features  She presents with progressive neck pain described that is arthritic in nature for the past few years  The patient describes predominantly aching, nagging, indolent crampy, stabbing axial neck pain with very occasional radicular features of electric shock-like and shooting pain in the right upper extremity following the C6 dermatomal distribution occasionally accompanied by weakness numbness and paresthesias  The pain is 8/10 nature and is worse with overhead maneuvers as well as lateral rotation and extension of the neck  The patient has been to physical therapy, and has failed conservative medical management including Cymbalta 60 mg per day  The pain is significant source of disability and compromises independent activities of daily living as well as overall function  The patient has difficulty with sleep disturbance as well since the pain often wakes her up   The patient has been to physical therapy with limited benefit;  Right-sided C3-C6 medial branch blocks #1 and #2 provided greater than 80% relief for a period of 5 and 1 days respectively with improved ability to participate with independent activities of daily living  No significant benefit from subsequent radiofrequency ablation    I have personally reviewed and/or updated the patient's past medical history, past surgical history, family history, social history, current medications, allergies, and vital signs today  Review of Systems   Constitutional: Positive for activity change  HENT: Negative  Eyes: Negative  Respiratory: Negative  Cardiovascular: Negative  Gastrointestinal: Negative  Endocrine: Negative  Genitourinary: Negative  Musculoskeletal: Positive for arthralgias, myalgias, neck pain and neck stiffness  Skin: Negative  Allergic/Immunologic: Negative  Neurological: Negative for weakness and numbness  Hematological: Negative  Psychiatric/Behavioral: Negative  All other systems reviewed and are negative  Patient Active Problem List   Diagnosis    Cervical facet joint syndrome    Depression    Hypertension    Fibromyalgia, primary    Diabetes mellitus ([place] [address] )    Osteoarthritis    GERD (gastroesophageal reflux disease)    Chronic pain disorder    Bipolar 1 disorder (HCC)    Cervical radiculopathy    Stage 3a chronic kidney disease ([facility] )       Past Medical History:   Diagnosis Date    Ambulates with cane     Pt states will use intermittently when fibromyalgia is really bad   Chronic kidney disease     Pt donated one of her kidneys to her spouse  Only has one      Chronic pain disorder     Depression     Diabetes mellitus ([facility] )     Takes amaryl daily before breakfast    Fibromyalgia, primary     GERD (gastroesophageal reflux disease)     Hypertension     Osteoarthritis        Past Surgical History:   Procedure Laterality Date    CATARACT EXTRACTION Bilateral      SECTION      2 emergency procedures    EPIDURAL BLOCK INJECTION N/A 6/15/2021    Procedure: C7 T1 CERVICAL EPIDURAL STEROID INJECTION;  Surgeon: Silvia Nelson MD;  [facility];  Service: Pain Management     HYSTERECTOMY      Pt only has fallopian tube on the right    KIDNEY SURGERY Right     "gave a kidney"    NERVE BLOCK Right 12/29/2020    Procedure: C3 C4 C5 C6 MEDIAL BRANCH BLOCK #1;  Surgeon: Mynor Hernandez MD;  [facility];  Service: Pain Management     NERVE BLOCK Right 3/23/2021    Procedure: C3 C4 C5 C6 MEDIAL BRANCH BLOCK #2;  Surgeon: Mynor Hernandez MD;  [facility];  Service: Pain Management     RADIOFREQUENCY ABLATION Right 4/6/2021    Procedure: C3 C4 C5 C6 RADIO FREQUENCY ABLATION right side;  Surgeon: Mynor Hernandez MD;  [facility];  Service: Pain Management     TONSILECTOMY AND ADNOIDECTOMY      TONSILLECTOMY      WISDOM TOOTH EXTRACTION      WRIST SURGERY Right     Carpal tunnel       Family History   Problem Relation Age of Onset    Hypertension Mother     No Known Problems Father        Social History     Occupational History    Not on file   Tobacco Use    Smoking status: Light Tobacco Smoker     Years: 35 00     Types: Cigarettes    Smokeless tobacco: Never Used    Tobacco comment: "only smoke when I drink"   Vaping Use    Vaping Use: Never used   Substance and Sexual Activity    Alcohol use:  Yes     Alcohol/week: 2 0 - 4 0 standard drinks     Types: 2 - 4 Glasses of wine per week     Comment: "who counts"    Drug use: Yes     Frequency: 1 0 times per week     Types: Marijuana     Comment: "For the pain"    Sexual activity: Not on file     Comment: did not ask       Current Outpatient Medications on File Prior to Visit   Medication Sig    amLODIPine (NORVASC) 10 mg tablet Take 10 mg by mouth daily    aspirin (ECOTRIN LOW STRENGTH) 81 mg EC tablet Take 81 mg by mouth daily    ASPIRIN 81 PO Take 81 mg by mouth daily    cycloSPORINE (RESTASIS) 0 05 % ophthalmic emulsion Administer 1 drop to both eyes 2 (two) times a day    DULoxetine (CYMBALTA) 60 mg delayed release capsule Take 100 mg by mouth daily     famotidine (PEPCID) 20 mg tablet Take 20 mg by mouth 2 (two) times a day    fluticasone (FLONASE) 50 mcg/act nasal spray 2 sprays into each nostril as needed     glimepiride (AMARYL) 1 mg tablet Take 1 mg by mouth every morning before breakfast    metoprolol tartrate (LOPRESSOR) 50 mg tablet Take 50 mg by mouth every 12 (twelve) hours    polyethylene glycol (MiraLax) 17 GM/SCOOP powder Take 17 g by mouth daily    QUEtiapine (SEROquel) 100 mg tablet Take 100 mg by mouth daily at bedtime    rosuvastatin (CRESTOR) 5 mg tablet Take 5 mg by mouth daily    [DISCONTINUED] pregabalin (LYRICA) 75 mg capsule Take 1 capsule (75 mg total) by mouth 2 (two) times a day     No current facility-administered medications on file prior to visit  Allergies   Allergen Reactions    Lisinopril-Hydrochlorothiazide Throat Swelling    Penicillins Hives    Nsaids Other (See Comments)     Pt only has one kidney- should not take NSAIDS  Physical Exam    /78   Pulse 76   Temp (!) 96 8 °F (36 °C)   Resp 20   Ht 5' 8" (1 727 m)   Wt 86 2 kg (190 lb)   BMI 28 89 kg/m²     Constitutional: normal, well developed, well nourished, alert, in no distress and non-toxic and no overt pain behavior  and obese  Eyes: anicteric  HEENT: grossly intact  Neck: supple, symmetric, trachea midline and no masses   Pulmonary:even and unlabored  Cardiovascular:No edema or pitting edema present  Skin:Normal without rashes or lesions and well hydrated  Psychiatric:Mood and affect appropriate  Neurologic:Cranial Nerves II-XII grossly intact Sensation grossly intact; clonus with + clayton's  Reflexes 3+ and brisk in upper extremeties  SLR negative bilaterally  Spurling's maneuver not performed  Musculoskeletal:normal gait  Normal heel toe and tip toe walking  Significant pain with cervical facet loading bilaterally and with lateral spine rotation right greater than left   Decreased range of motion with right-sided lateral rotation of neck   TTP over right-sided cervical paraspinal muscles  Negative nas's test, negative gaenslen's negative SIJ loading bilaterally  Imaging    MRI CERVICAL SPINE WITHOUT CONTRAST     INDICATION: M54 2: Cervicalgia  Neck pain radiating into the right shoulder and down the arm      COMPARISON:  None      TECHNIQUE:  Sagittal T1, sagittal T2, sagittal inversion recovery, axial T2, axial  2D merge     IMAGE QUALITY:  Diagnostic     FINDINGS:     ALIGNMENT:  Normal alignment of the cervical spine  No compression fracture  No subluxation  No scoliosis      MARROW SIGNAL:  Scattered degenerative endplate changes  No focally suspicious marrow lesions  No bone marrow edema or compression abnormality      CERVICAL AND VISUALIZED THORACIC CORD:  Subtle foci of cord signal at the superior endplate of C6 as well as the superior endplate of C7 noted without cord expansion likely small foci of myelomalacia      PREVERTEBRAL AND PARASPINAL SOFT TISSUES:  Normal      VISUALIZED POSTERIOR FOSSA:  The visualized posterior fossa demonstrates no abnormal signal      CERVICAL DISC SPACES:     C2-C3:  Normal      C3-C4:  There is a disc osteophyte complex with a superimposed left neural foraminal disc protrusion  Moderate left neural foraminal narrowing  Mild to moderate central canal narrowing  Mild right neural foraminal narrowing      C4-C5:  Small right paracentral distribution  Mild central canal narrowing  Mild right neural foraminal narrowing  Left neural foramen patent      C5-C6:  There is loss of disc height and signal   There is a disc osteophyte complex with a superimposed left neural foraminal disc protrusion  Mild to moderate central canal narrowing  Moderate to severe left neural foraminal narrowing  Mild right   neural foraminal narrowing      C6-C7:  There is a disc osteophyte complex with a superimposed right neural foraminal disc protrusion  Moderate to severe central canal narrowing    Severe right neural foraminal narrowing  Moderate left neural foraminal narrowing      C7-T1:  Right neural foraminal disc protrusion  Moderate right neural foraminal narrowing  Central canal minimally narrowed  Mild left neural foraminal narrowing      UPPER THORACIC DISC SPACES:  Normal      IMPRESSION:     1   Multilevel spondylosis most pronounced at C5-C6 and C6-C7   2   Subtle foci of cord signal abnormality in the lower cervical cord at the superior endplates of C6 and C7 without cord expansion, likely small foci of myelomalacia        Cervical spondylosis and degenerative disc disease seen on x-ray of of cervical spine

## 2021-09-15 NOTE — PATIENT INSTRUCTIONS
Neck Exercises   AMBULATORY CARE:   Neck exercises  help reduce neck pain, and improve neck movement and strength  Neck exercises also help prevent long-term neck problems  What you need to know about neck exercises:   · Do the exercises every day,  or as often as directed by your healthcare provider  · Move slowly, gently, and smoothly  Avoid fast or jerky motions  · Stand and sit the way your healthcare provider shows you  Good posture may reduce your neck pain  Check your posture often, even when you are not doing your neck exercises  How to perform neck exercises safely:   · Exercise position:  You may sit or stand while you do neck exercises  Face forward  Your shoulders should be straight and relaxed, with a good posture  · Head tilts, forward and back:  Gently bow your head and try to touch your chin to your chest  Your healthcare provider may tell you to push on the back of your neck to help bow your head  Raise your chin back to the starting position  Tilt your head back as far as possible so you are looking up at the ceiling  Your healthcare provider may tell you to lift your chin to help tilt your head back  Return your head to the starting position  · Head tilts, side to side:  Tilt your head, bringing your ear toward your shoulder  Then tilt your head toward the other shoulder  · Head turns:  Turn your head to look over your shoulder  Tilt your chin down and try to touch it to your shoulder  Do not raise your shoulder to your chin  Face forward again  Do the same on the other side  · Head rolls:  Slowly bring your chin toward your chest  Next, roll your head to the right  Your ear should be positioned over your shoulder  Hold this position for 5 seconds  Roll your head back toward your chest and to the left into the same position  Hold for 5 seconds  Gently roll your head back and around in a clockwise [place] 3 times   Next, move your head in the reverse direction (counterclockwise) in a [place] 3 times  Do not shrug your shoulders upwards while you do this exercise  Contact your healthcare provider if:   · Your pain does not get better, or gets worse  · You have questions or concerns about your condition, care, or exercise program     © Copyright [employer] 2021 Information is for End User's use only and may not be sold, redistributed or otherwise used for commercial purposes  All illustrations and images included in CareNotes® are the copyrighted property of [employer]  or [facility] Guido Miramontes   The above information is an  only  It is not intended as medical advice for individual conditions or treatments  Talk to your doctor, nurse or pharmacist before following any medical regimen to see if it is safe and effective for you

## 2021-11-02 ENCOUNTER — TELEPHONE (OUTPATIENT)
Dept: NEUROSURGERY | Facility: CLINIC | Age: 61
End: 2021-11-02

## 2022-03-19 PROBLEM — I10 ACCELERATED HYPERTENSION: Status: ACTIVE | Noted: 2021-04-06

## 2022-03-19 PROBLEM — N61.1 BREAST ABSCESS: Status: ACTIVE | Noted: 2019-09-12

## 2022-03-19 PROBLEM — N61.0 CELLULITIS OF BREAST: Status: ACTIVE | Noted: 2019-09-12

## 2022-03-19 PROBLEM — R51.9 INTRACTABLE HEADACHE: Status: ACTIVE | Noted: 2021-04-06

## 2022-03-30 RX ORDER — CYCLOBENZAPRINE HCL 10 MG
10 TABLET ORAL 3 TIMES DAILY PRN
Refills: 0 | OUTPATIENT
Start: 2022-03-30

## 2022-04-04 ENCOUNTER — OFFICE VISIT (OUTPATIENT)
Dept: PAIN MEDICINE | Facility: CLINIC | Age: 62
End: 2022-04-04
Payer: MEDICARE

## 2022-04-04 VITALS
RESPIRATION RATE: 20 BRPM | SYSTOLIC BLOOD PRESSURE: 112 MMHG | DIASTOLIC BLOOD PRESSURE: 78 MMHG | HEART RATE: 75 BPM | TEMPERATURE: 98 F | BODY MASS INDEX: 28.89 KG/M2 | HEIGHT: 68 IN

## 2022-04-04 DIAGNOSIS — N18.31 STAGE 3A CHRONIC KIDNEY DISEASE (HCC): ICD-10-CM

## 2022-04-04 DIAGNOSIS — F31.9 BIPOLAR 1 DISORDER (HCC): ICD-10-CM

## 2022-04-04 DIAGNOSIS — E08.22 DIABETES MELLITUS DUE TO UNDERLYING CONDITION WITH STAGE 3 CHRONIC KIDNEY DISEASE, WITH LONG-TERM CURRENT USE OF INSULIN, UNSPECIFIED WHETHER STAGE 3A OR 3B CKD (HCC): ICD-10-CM

## 2022-04-04 DIAGNOSIS — N18.30 DIABETES MELLITUS DUE TO UNDERLYING CONDITION WITH STAGE 3 CHRONIC KIDNEY DISEASE, WITH LONG-TERM CURRENT USE OF INSULIN, UNSPECIFIED WHETHER STAGE 3A OR 3B CKD (HCC): ICD-10-CM

## 2022-04-04 DIAGNOSIS — M62.838 MUSCLE SPASM: Primary | ICD-10-CM

## 2022-04-04 DIAGNOSIS — M54.12 CERVICAL RADICULOPATHY: ICD-10-CM

## 2022-04-04 DIAGNOSIS — Z79.4 DIABETES MELLITUS DUE TO UNDERLYING CONDITION WITH STAGE 3 CHRONIC KIDNEY DISEASE, WITH LONG-TERM CURRENT USE OF INSULIN, UNSPECIFIED WHETHER STAGE 3A OR 3B CKD (HCC): ICD-10-CM

## 2022-04-04 PROCEDURE — 99214 OFFICE O/P EST MOD 30 MIN: CPT | Performed by: ANESTHESIOLOGY

## 2022-04-04 RX ORDER — CYCLOBENZAPRINE HCL 5 MG
5 TABLET ORAL 3 TIMES DAILY PRN
Qty: 90 TABLET | Refills: 0 | Status: SHIPPED | OUTPATIENT
Start: 2022-04-04

## 2022-04-04 RX ORDER — 0.9 % SODIUM CHLORIDE 0.9 %
1 VIAL (ML) INJECTION ONCE
Status: CANCELLED | OUTPATIENT
Start: 2022-04-04 | End: 2022-04-04

## 2022-04-04 RX ORDER — LIDOCAINE HYDROCHLORIDE 10 MG/ML
5 INJECTION, SOLUTION EPIDURAL; INFILTRATION; INTRACAUDAL; PERINEURAL ONCE
Status: CANCELLED | OUTPATIENT
Start: 2022-04-04 | End: 2022-04-04

## 2022-04-04 RX ORDER — METHYLPREDNISOLONE ACETATE 80 MG/ML
80 INJECTION, SUSPENSION INTRA-ARTICULAR; INTRALESIONAL; INTRAMUSCULAR; PARENTERAL; SOFT TISSUE ONCE
Status: CANCELLED | OUTPATIENT
Start: 2022-04-04 | End: 2022-04-04

## 2022-04-04 NOTE — PROGRESS NOTES
Assessment  1  Muscle spasm  -     cyclobenzaprine (FLEXERIL) 5 mg tablet; Take 1 tablet (5 mg total) by mouth 3 (three) times a day as needed for muscle spasms    2  Bipolar 1 disorder ([address] )    3  Diabetes mellitus due to underlying condition with stage 3 chronic kidney disease, with long-term current use of insulin, unspecified whether stage 3a or 3b CKD (HCC)    4  Stage 3a chronic kidney disease (HCC)    5  Cervical radiculopathy - Right    MRI cervical spine shows loss of disc height and signal at C5-C6  There is a disc osteophyte complex with a superimposed left neural foraminal disc protrusion  Mild to moderate central canal narrowing  Moderate to severe left neural foraminal narrowing  Mild right neural foraminal narrowing  At C6-C7 there is a disc osteophyte complex with a superimposed right neural foraminal disc protrusion  Moderate to severe central canal narrowing  Severe right neural foraminal narrowing  Moderate left neural foraminal narrowing  Responded well with at least 50% improvement with pain and functionality with increased participation with IADLs after C7-T1 ILESI performed 06/15/2021; pain has subsequently returned after more than 8 months of relief  In the interim she has had increased range of motion with her neck since the procedure that was performed 6/15/21  In the past month she has developed increasing gait instability and falls; denies bowel/bladder abnormalities  Endorses stiffness and decreased  strength/dropping things at times  +clayton's; will refer to [address] [place] [address] for possible surgical intervention given symptoms of myelopathy (has appointment with Dr Abimbola Lion in June)  Previously reported the following symptomatology:    Chronic right-sided neck pain described primarily by arthritic features  Degenerative disc disease and spondylosis contributory  Recent neck is x-ray report unavailable but imaging reviewed through care everywhere   showing mild spondylosis    Facet loading maneuvers positive  Denies any headaches  Reasonable to proceed with conservative multimodal pain therapy  NSAIDs relatively contraindicated secondary to renal dysfunction  Right-sided C3-C6 medial branch blocks #1 and #2 provided greater than 80% relief for a period of 5  And 1 days respectively with improved ability to participate with independent activities of daily living  No significant benefit from radiofrequency ablation  Plan  -repeat C7-T1 interlaminar epidural steroid injection; follow-up 2 weeks post procedure  -Flexeril 5 mg t i d  p r n  Pain prescribed  Counseled against operating heavy machinery or driving while under the influence of this medication  States that she will use just at night  - Referral to [address] [place] [address] for surgical intervention given moderate to severe spinal stenosis seen at c5-6 and c6-7 with myelopathic signs on physical exam   - Counseled patient with regard to red flag signs, she will contact our office or seek emergency care should sudden weakness, bowel or bladder abnormality occur  There are risks associated with opioid medications, including dependence, addiction and tolerance  The patient understands and agrees to use these medications only as prescribed  Potential side effects of the medications include, but are not limited to, constipation, drowsiness, addiction, impaired judgment and risk of fatal overdose if not taken as prescribed  The patient was warned against driving while taking sedation medications  Sharing medications is a felony  At this point in time, the patient is showing no signs of addiction, abuse, diversion or suicidal ideation  [place] Prescription Drug Monitoring Program report was reviewed and was appropriate     Complete risks and benefits including bleeding, infection, tissue reaction, nerve injury and allergic reaction were discussed  The approach was demonstrated using models and literature was provided   Verbal and written consent was obtained  My impressions and treatment recommendations were discussed in detail with patient who verbalized understanding and had no further questions  Discharge instructions were provided  I personally saw and examined the patient and I agree with the above discussed plan of care  New Medications Ordered This Visit   Medications    cyclobenzaprine (FLEXERIL) 5 mg tablet     Sig: Take 1 tablet (5 mg total) by mouth 3 (three) times a day as needed for muscle spasms     Dispense:  90 tablet     Refill:  0       History of Present Illness    MRI cervical spine shows loss of disc height and signal at C5-C6  There is a disc osteophyte complex with a superimposed left neural foraminal disc protrusion  Mild to moderate central canal narrowing  Moderate to severe left neural foraminal narrowing  Mild right neural foraminal narrowing  At C6-C7 there is a disc osteophyte complex with a superimposed right neural foraminal disc protrusion  Moderate to severe central canal narrowing  Severe right neural foraminal narrowing  Moderate left neural foraminal narrowing  Responded well with at least 50% improvement with pain and functionality with increased participation with IADLs after C7-T1 ILESI performed 06/15/2021; pain has subsequently returned after more than 8 months of relief  In the interim she has had increased range of motion with her neck since the procedure that was performed 6/15/21  In the past month she has developed increasing gait instability and falls; denies bowel/bladder abnormalities  Endorses stiffness and decreased  strength/dropping things at times  +clayton's; will refer to [address] [place] [address] for possible surgical intervention given symptoms of myelopathy (has appointment with Dr Stacey Nelson in June)  Previously reported the following symptomatology:    Nathan Bentley is a 62 y o  female presenting with right-sided neck pain described primarily arthritic features    She presents with progressive neck pain described that is arthritic in nature for the past few years  The patient describes predominantly aching, nagging, indolent crampy, stabbing axial neck pain with very occasional radicular features of electric shock-like and shooting pain in the right upper extremity following the C6 dermatomal distribution occasionally accompanied by weakness numbness and paresthesias  The pain is 8/10 nature and is worse with overhead maneuvers as well as lateral rotation and extension of the neck  The patient has been to physical therapy, and has failed conservative medical management including Cymbalta 60 mg per day  The pain is significant source of disability and compromises independent activities of daily living as well as overall function  The patient has difficulty with sleep disturbance as well since the pain often wakes her up  The patient has been to physical therapy with limited benefit;  Right-sided C3-C6 medial branch blocks #1 and #2 provided greater than 80% relief for a period of 5 and 1 days respectively with improved ability to participate with independent activities of daily living  No significant benefit from subsequent radiofrequency ablation    I have personally reviewed and/or updated the patient's past medical history, past surgical history, family history, social history, current medications, allergies, and vital signs today  Review of Systems   Constitutional: Positive for activity change  HENT: Negative  Eyes: Negative  Respiratory: Negative  Cardiovascular: Negative  Gastrointestinal: Negative  Endocrine: Negative  Genitourinary: Negative  Musculoskeletal: Positive for arthralgias, myalgias, neck pain and neck stiffness  Skin: Negative  Allergic/Immunologic: Negative  Neurological: Negative for weakness and numbness  Hematological: Negative  Psychiatric/Behavioral: Negative  All other systems reviewed and are negative        Patient Active Problem List   Diagnosis    Cervical facet joint syndrome    Depression    Hypertension    Fibromyalgia, primary    Diabetes mellitus ([address] )    Osteoarthritis    GERD (gastroesophageal reflux disease)    Chronic pain disorder    Bipolar 1 disorder (HCC)    Cervical radiculopathy    Stage 3a chronic kidney disease (HCC)       Past Medical History:   Diagnosis Date    Ambulates with cane     Pt states will use intermittently when fibromyalgia is really bad   Chronic kidney disease     Pt donated one of her kidneys to her spouse  Only has one      Chronic pain disorder     Depression     Diabetes mellitus ([facility] )     Takes amaryl daily before breakfast    Fibromyalgia, primary     GERD (gastroesophageal reflux disease)     Hypertension     Osteoarthritis        Past Surgical History:   Procedure Laterality Date    CATARACT EXTRACTION Bilateral      SECTION      2 emergency procedures    EPIDURAL BLOCK INJECTION N/A 6/15/2021    Procedure: C7 T1 CERVICAL EPIDURAL STEROID INJECTION;  Surgeon: Gisel Shaikh MD;  Location: [facility];  Service: Pain Management     HYSTERECTOMY      Pt only has fallopian tube on the right    KIDNEY SURGERY Right     "gave a kidney"    NERVE BLOCK Right 2020    Procedure: C3 C4 C5 C6 MEDIAL BRANCH BLOCK #1;  Surgeon: Gisel Shaikh MD;  [facility];  Service: Pain Management     NERVE BLOCK Right 3/23/2021    Procedure: C3 C4 C5 C6 MEDIAL BRANCH BLOCK #2;  Surgeon: Gisel Shaikh MD;  [facility];  Service: Pain Management     RADIOFREQUENCY ABLATION Right 2021    Procedure: C3 C4 C5 C6 RADIO FREQUENCY ABLATION right side;  Surgeon: Gisel Shaikh MD;  [facility];  Service: Pain Management     TONSILECTOMY AND ADNOIDECTOMY      TONSILLECTOMY      WISDOM TOOTH EXTRACTION      WRIST SURGERY Right     Carpal tunnel       Family History   Problem Relation Age of Onset    Hypertension Mother     No Known Problems Father Social History     Occupational History    Not on file   Tobacco Use    Smoking status: Light Tobacco Smoker     Years: 35 00     Types: Cigarettes    Smokeless tobacco: Never Used    Tobacco comment: "only smoke when I drink"   Vaping Use    Vaping Use: Never used   Substance and Sexual Activity    Alcohol use: Yes     Alcohol/week: 2 0 - 4 0 standard drinks     Types: 2 - 4 Glasses of wine per week     Comment: "who counts"    Drug use: Yes     Frequency: 1 0 times per week     Types: Marijuana     Comment: "For the pain"    Sexual activity: Not on file     Comment: did not ask       Current Outpatient Medications on File Prior to Visit   Medication Sig    amLODIPine (NORVASC) 10 mg tablet Take 10 mg by mouth daily    ASPIRIN 81 PO Take 81 mg by mouth daily    Azelastine HCl 0 15 % SOLN     colchicine (COLCRYS) 0 6 mg tablet     cycloSPORINE (RESTASIS) 0 05 % ophthalmic emulsion Administer 1 drop to both eyes 2 (two) times a day    DULoxetine (CYMBALTA) 60 mg delayed release capsule Take 100 mg by mouth daily     famotidine (PEPCID) 20 mg tablet Take 20 mg by mouth 2 (two) times a day    fluticasone (FLONASE) 50 mcg/act nasal spray 2 sprays into each nostril as needed     glimepiride (AMARYL) 1 mg tablet Take 1 mg by mouth every morning before breakfast    metoprolol tartrate (LOPRESSOR) 50 mg tablet Take 50 mg by mouth every 12 (twelve) hours    polyethylene glycol (MiraLax) 17 GM/SCOOP powder Take 17 g by mouth daily    QUEtiapine (SEROquel) 100 mg tablet Take 100 mg by mouth daily at bedtime    rosuvastatin (CRESTOR) 5 mg tablet Take 5 mg by mouth daily    Rybelsus 7 MG TABS     valsartan (DIOVAN) 320 MG tablet     Vascepa 1 g CAPS     [DISCONTINUED] aspirin (ECOTRIN LOW STRENGTH) 81 mg EC tablet Take 81 mg by mouth daily    [DISCONTINUED] Rybelsus 3 MG TABS      No current facility-administered medications on file prior to visit         Allergies   Allergen Reactions    Lisinopril-Hydrochlorothiazide Throat Swelling    Penicillins Hives    Nsaids Other (See Comments)     Pt only has one kidney- should not take NSAIDS  Physical Exam    /78   Pulse 75   Temp 98 °F (36 7 °C)   Resp 20   Ht 5' 8" (1 727 m)   BMI 28 89 kg/m²     Constitutional: normal, well developed, well nourished, alert, in no distress and non-toxic and no overt pain behavior  and obese  Eyes: anicteric  HEENT: grossly intact  Neck: supple, symmetric, trachea midline and no masses   Pulmonary:even and unlabored  Cardiovascular:No edema or pitting edema present  Skin:Normal without rashes or lesions and well hydrated  Psychiatric:Mood and affect appropriate  Neurologic:Cranial Nerves II-XII grossly intact Sensation grossly intact; clonus with + clayton's  Reflexes 3+ and brisk in upper extremeties  SLR negative bilaterally  Spurling's maneuver not performed  Musculoskeletal:normal gait  Normal heel toe and tip toe walking  Significant pain with cervical facet loading bilaterally and with lateral spine rotation right greater than left  Decreased range of motion with right-sided lateral rotation of neck  TTP over right-sided cervical paraspinal muscles  Negative nas's test, negative gaenslen's negative SIJ loading bilaterally  Imaging    MRI CERVICAL SPINE WITHOUT CONTRAST     INDICATION: M54 2: Cervicalgia  Neck pain radiating into the right shoulder and down the arm      COMPARISON:  None      TECHNIQUE:  Sagittal T1, sagittal T2, sagittal inversion recovery, axial T2, axial  2D merge     IMAGE QUALITY:  Diagnostic     FINDINGS:     ALIGNMENT:  Normal alignment of the cervical spine  No compression fracture  No subluxation  No scoliosis      MARROW SIGNAL:  Scattered degenerative endplate changes  No focally suspicious marrow lesions    No bone marrow edema or compression abnormality      CERVICAL AND VISUALIZED THORACIC CORD:  Subtle foci of cord signal at the superior endplate of C6 as well as the superior endplate of C7 noted without cord expansion likely small foci of myelomalacia      PREVERTEBRAL AND PARASPINAL SOFT TISSUES:  Normal      VISUALIZED POSTERIOR FOSSA:  The visualized posterior fossa demonstrates no abnormal signal      CERVICAL DISC SPACES:     C2-C3:  Normal      C3-C4:  There is a disc osteophyte complex with a superimposed left neural foraminal disc protrusion  Moderate left neural foraminal narrowing  Mild to moderate central canal narrowing  Mild right neural foraminal narrowing      C4-C5:  Small right paracentral distribution  Mild central canal narrowing  Mild right neural foraminal narrowing  Left neural foramen patent      C5-C6:  There is loss of disc height and signal   There is a disc osteophyte complex with a superimposed left neural foraminal disc protrusion  Mild to moderate central canal narrowing  Moderate to severe left neural foraminal narrowing  Mild right   neural foraminal narrowing      C6-C7:  There is a disc osteophyte complex with a superimposed right neural foraminal disc protrusion  Moderate to severe central canal narrowing  Severe right neural foraminal narrowing  Moderate left neural foraminal narrowing      C7-T1:  Right neural foraminal disc protrusion  Moderate right neural foraminal narrowing  Central canal minimally narrowed  Mild left neural foraminal narrowing      UPPER THORACIC DISC SPACES:  Normal      IMPRESSION:     1   Multilevel spondylosis most pronounced at C5-C6 and C6-C7   2   Subtle foci of cord signal abnormality in the lower cervical cord at the superior endplates of C6 and C7 without cord expansion, likely small foci of myelomalacia        Cervical spondylosis and degenerative disc disease seen on x-ray of of cervical spine

## 2022-04-04 NOTE — PATIENT INSTRUCTIONS
Neck Exercises   AMBULATORY CARE:   Neck exercises  help reduce neck pain, and improve neck movement and strength  Neck exercises also help prevent long-term neck problems  What you need to know about neck exercises:   · Do the exercises every day,  or as often as directed by your healthcare provider  · Move slowly, gently, and smoothly  Avoid fast or jerky motions  · Stand and sit the way your healthcare provider shows you  Good posture may reduce your neck pain  Check your posture often, even when you are not doing your neck exercises  How to perform neck exercises safely:   · Exercise position:  You may sit or stand while you do neck exercises  Face forward  Your shoulders should be straight and relaxed, with a good posture  · Head tilts, forward and back:  Gently bow your head and try to touch your chin to your chest  Your healthcare provider may tell you to push on the back of your neck to help bow your head  Raise your chin back to the starting position  Tilt your head back as far as possible so you are looking up at the ceiling  Your healthcare provider may tell you to lift your chin to help tilt your head back  Return your head to the starting position  · Head tilts, side to side:  Tilt your head, bringing your ear toward your shoulder  Then tilt your head toward the other shoulder  · Head turns:  Turn your head to look over your shoulder  Tilt your chin down and try to touch it to your shoulder  Do not raise your shoulder to your chin  Face forward again  Do the same on the other side  · Head rolls:  Slowly bring your chin toward your chest  Next, roll your head to the right  Your ear should be positioned over your shoulder  Hold this position for 5 seconds  Roll your head back toward your chest and to the left into the same position  Hold for 5 seconds  Gently roll your head back and around in a clockwise [place] 3 times   Next, move your head in the reverse direction (counterclockwise) in a [place] 3 times  Do not shrug your shoulders upwards while you do this exercise  Contact your healthcare provider if:   · Your pain does not get better, or gets worse  · You have questions or concerns about your condition, care, or exercise program     © Copyright [employer] 2022 Information is for End User's use only and may not be sold, redistributed or otherwise used for commercial purposes  All illustrations and images included in CareNotes® are the copyrighted property of [employer]  or [facility] Guido Miramontes   The above information is an  only  It is not intended as medical advice for individual conditions or treatments  Talk to your doctor, nurse or pharmacist before following any medical regimen to see if it is safe and effective for you

## 2022-04-04 NOTE — H&P (VIEW-ONLY)
Assessment  1  Muscle spasm  -     cyclobenzaprine (FLEXERIL) 5 mg tablet; Take 1 tablet (5 mg total) by mouth 3 (three) times a day as needed for muscle spasms    2  Bipolar 1 disorder ([address] )    3  Diabetes mellitus due to underlying condition with stage 3 chronic kidney disease, with long-term current use of insulin, unspecified whether stage 3a or 3b CKD (HCC)    4  Stage 3a chronic kidney disease (HCC)    5  Cervical radiculopathy - Right    MRI cervical spine shows loss of disc height and signal at C5-C6  There is a disc osteophyte complex with a superimposed left neural foraminal disc protrusion  Mild to moderate central canal narrowing  Moderate to severe left neural foraminal narrowing  Mild right neural foraminal narrowing  At C6-C7 there is a disc osteophyte complex with a superimposed right neural foraminal disc protrusion  Moderate to severe central canal narrowing  Severe right neural foraminal narrowing  Moderate left neural foraminal narrowing  Responded well with at least 50% improvement with pain and functionality with increased participation with IADLs after C7-T1 ILESI performed 06/15/2021; pain has subsequently returned after more than 8 months of relief  In the interim she has had increased range of motion with her neck since the procedure that was performed 6/15/21  In the past month she has developed increasing gait instability and falls; denies bowel/bladder abnormalities  Endorses stiffness and decreased  strength/dropping things at times  +clayton's; will refer to [address] [place] [address] for possible surgical intervention given symptoms of myelopathy (has appointment with Dr Rachel Pino in June)  Previously reported the following symptomatology:    Chronic right-sided neck pain described primarily by arthritic features  Degenerative disc disease and spondylosis contributory  Recent neck is x-ray report unavailable but imaging reviewed through care everywhere   showing mild spondylosis    Facet loading maneuvers positive  Denies any headaches  Reasonable to proceed with conservative multimodal pain therapy  NSAIDs relatively contraindicated secondary to renal dysfunction  Right-sided C3-C6 medial branch blocks #1 and #2 provided greater than 80% relief for a period of 5  And 1 days respectively with improved ability to participate with independent activities of daily living  No significant benefit from radiofrequency ablation  Plan  -repeat C7-T1 interlaminar epidural steroid injection; follow-up 2 weeks post procedure  -Flexeril 5 mg t i d  p r n  Pain prescribed  Counseled against operating heavy machinery or driving while under the influence of this medication  States that she will use just at night  - Referral to [address] [place] [address] for surgical intervention given moderate to severe spinal stenosis seen at c5-6 and c6-7 with myelopathic signs on physical exam   - Counseled patient with regard to red flag signs, she will contact our office or seek emergency care should sudden weakness, bowel or bladder abnormality occur  There are risks associated with opioid medications, including dependence, addiction and tolerance  The patient understands and agrees to use these medications only as prescribed  Potential side effects of the medications include, but are not limited to, constipation, drowsiness, addiction, impaired judgment and risk of fatal overdose if not taken as prescribed  The patient was warned against driving while taking sedation medications  Sharing medications is a felony  At this point in time, the patient is showing no signs of addiction, abuse, diversion or suicidal ideation  [place] Prescription Drug Monitoring Program report was reviewed and was appropriate     Complete risks and benefits including bleeding, infection, tissue reaction, nerve injury and allergic reaction were discussed  The approach was demonstrated using models and literature was provided   Verbal and written consent was obtained  My impressions and treatment recommendations were discussed in detail with patient who verbalized understanding and had no further questions  Discharge instructions were provided  I personally saw and examined the patient and I agree with the above discussed plan of care  New Medications Ordered This Visit   Medications    cyclobenzaprine (FLEXERIL) 5 mg tablet     Sig: Take 1 tablet (5 mg total) by mouth 3 (three) times a day as needed for muscle spasms     Dispense:  90 tablet     Refill:  0       History of Present Illness    MRI cervical spine shows loss of disc height and signal at C5-C6  There is a disc osteophyte complex with a superimposed left neural foraminal disc protrusion  Mild to moderate central canal narrowing  Moderate to severe left neural foraminal narrowing  Mild right neural foraminal narrowing  At C6-C7 there is a disc osteophyte complex with a superimposed right neural foraminal disc protrusion  Moderate to severe central canal narrowing  Severe right neural foraminal narrowing  Moderate left neural foraminal narrowing  Responded well with at least 50% improvement with pain and functionality with increased participation with IADLs after C7-T1 ILESI performed 06/15/2021; pain has subsequently returned after more than 8 months of relief  In the interim she has had increased range of motion with her neck since the procedure that was performed 6/15/21  In the past month she has developed increasing gait instability and falls; denies bowel/bladder abnormalities  Endorses stiffness and decreased  strength/dropping things at times  +clayton's; will refer to [address] [place] [address] for possible surgical intervention given symptoms of myelopathy (has appointment with Dr Hipolito Castanon in June)  Previously reported the following symptomatology:    Alina Brenner is a 62 y o  female presenting with right-sided neck pain described primarily arthritic features    She presents with progressive neck pain described that is arthritic in nature for the past few years  The patient describes predominantly aching, nagging, indolent crampy, stabbing axial neck pain with very occasional radicular features of electric shock-like and shooting pain in the right upper extremity following the C6 dermatomal distribution occasionally accompanied by weakness numbness and paresthesias  The pain is 8/10 nature and is worse with overhead maneuvers as well as lateral rotation and extension of the neck  The patient has been to physical therapy, and has failed conservative medical management including Cymbalta 60 mg per day  The pain is significant source of disability and compromises independent activities of daily living as well as overall function  The patient has difficulty with sleep disturbance as well since the pain often wakes her up  The patient has been to physical therapy with limited benefit;  Right-sided C3-C6 medial branch blocks #1 and #2 provided greater than 80% relief for a period of 5 and 1 days respectively with improved ability to participate with independent activities of daily living  No significant benefit from subsequent radiofrequency ablation    I have personally reviewed and/or updated the patient's past medical history, past surgical history, family history, social history, current medications, allergies, and vital signs today  Review of Systems   Constitutional: Positive for activity change  HENT: Negative  Eyes: Negative  Respiratory: Negative  Cardiovascular: Negative  Gastrointestinal: Negative  Endocrine: Negative  Genitourinary: Negative  Musculoskeletal: Positive for arthralgias, myalgias, neck pain and neck stiffness  Skin: Negative  Allergic/Immunologic: Negative  Neurological: Negative for weakness and numbness  Hematological: Negative  Psychiatric/Behavioral: Negative  All other systems reviewed and are negative        Patient Active Problem List   Diagnosis    Cervical facet joint syndrome    Depression    Hypertension    Fibromyalgia, primary    Diabetes mellitus ([address] )    Osteoarthritis    GERD (gastroesophageal reflux disease)    Chronic pain disorder    Bipolar 1 disorder (HCC)    Cervical radiculopathy    Stage 3a chronic kidney disease (HCC)       Past Medical History:   Diagnosis Date    Ambulates with cane     Pt states will use intermittently when fibromyalgia is really bad   Chronic kidney disease     Pt donated one of her kidneys to her spouse  Only has one      Chronic pain disorder     Depression     Diabetes mellitus ([facility] [address] )     Takes amaryl daily before breakfast    Fibromyalgia, primary     GERD (gastroesophageal reflux disease)     Hypertension     Osteoarthritis        Past Surgical History:   Procedure Laterality Date    CATARACT EXTRACTION Bilateral      SECTION      2 emergency procedures    EPIDURAL BLOCK INJECTION N/A 6/15/2021    Procedure: C7 T1 CERVICAL EPIDURAL STEROID INJECTION;  Surgeon: Yarelis Oswald MD;  Location: [facility];  Service: Pain Management     HYSTERECTOMY      Pt only has fallopian tube on the right    KIDNEY SURGERY Right     "gave a kidney"    NERVE BLOCK Right 2020    Procedure: C3 C4 C5 C6 MEDIAL BRANCH BLOCK #1;  Surgeon: Yarelis Oswald MD;  [facility];  Service: Pain Management     NERVE BLOCK Right 3/23/2021    Procedure: C3 C4 C5 C6 MEDIAL BRANCH BLOCK #2;  Surgeon: Yarelis Oswald MD;  [facility];  Service: Pain Management     RADIOFREQUENCY ABLATION Right 2021    Procedure: C3 C4 C5 C6 RADIO FREQUENCY ABLATION right side;  Surgeon: Yarelis Oswald MD;  [facility];  Service: Pain Management     TONSILECTOMY AND ADNOIDECTOMY      TONSILLECTOMY      WISDOM TOOTH EXTRACTION      WRIST SURGERY Right     Carpal tunnel       Family History   Problem Relation Age of Onset    Hypertension Mother     No Known Problems Father Social History     Occupational History    Not on file   Tobacco Use    Smoking status: Light Tobacco Smoker     Years: 35 00     Types: Cigarettes    Smokeless tobacco: Never Used    Tobacco comment: "only smoke when I drink"   Vaping Use    Vaping Use: Never used   Substance and Sexual Activity    Alcohol use: Yes     Alcohol/week: 2 0 - 4 0 standard drinks     Types: 2 - 4 Glasses of wine per week     Comment: "who counts"    Drug use: Yes     Frequency: 1 0 times per week     Types: Marijuana     Comment: "For the pain"    Sexual activity: Not on file     Comment: did not ask       Current Outpatient Medications on File Prior to Visit   Medication Sig    amLODIPine (NORVASC) 10 mg tablet Take 10 mg by mouth daily    ASPIRIN 81 PO Take 81 mg by mouth daily    Azelastine HCl 0 15 % SOLN     colchicine (COLCRYS) 0 6 mg tablet     cycloSPORINE (RESTASIS) 0 05 % ophthalmic emulsion Administer 1 drop to both eyes 2 (two) times a day    DULoxetine (CYMBALTA) 60 mg delayed release capsule Take 100 mg by mouth daily     famotidine (PEPCID) 20 mg tablet Take 20 mg by mouth 2 (two) times a day    fluticasone (FLONASE) 50 mcg/act nasal spray 2 sprays into each nostril as needed     glimepiride (AMARYL) 1 mg tablet Take 1 mg by mouth every morning before breakfast    metoprolol tartrate (LOPRESSOR) 50 mg tablet Take 50 mg by mouth every 12 (twelve) hours    polyethylene glycol (MiraLax) 17 GM/SCOOP powder Take 17 g by mouth daily    QUEtiapine (SEROquel) 100 mg tablet Take 100 mg by mouth daily at bedtime    rosuvastatin (CRESTOR) 5 mg tablet Take 5 mg by mouth daily    Rybelsus 7 MG TABS     valsartan (DIOVAN) 320 MG tablet     Vascepa 1 g CAPS     [DISCONTINUED] aspirin (ECOTRIN LOW STRENGTH) 81 mg EC tablet Take 81 mg by mouth daily    [DISCONTINUED] Rybelsus 3 MG TABS      No current facility-administered medications on file prior to visit         Allergies   Allergen Reactions    Lisinopril-Hydrochlorothiazide Throat Swelling    Penicillins Hives    Nsaids Other (See Comments)     Pt only has one kidney- should not take NSAIDS  Physical Exam    /78   Pulse 75   Temp 98 °F (36 7 °C)   Resp 20   Ht 5' 8" (1 727 m)   BMI 28 89 kg/m²     Constitutional: normal, well developed, well nourished, alert, in no distress and non-toxic and no overt pain behavior  and obese  Eyes: anicteric  HEENT: grossly intact  Neck: supple, symmetric, trachea midline and no masses   Pulmonary:even and unlabored  Cardiovascular:No edema or pitting edema present  Skin:Normal without rashes or lesions and well hydrated  Psychiatric:Mood and affect appropriate  Neurologic:Cranial Nerves II-XII grossly intact Sensation grossly intact; clonus with + clayton's  Reflexes 3+ and brisk in upper extremeties  SLR negative bilaterally  Spurling's maneuver not performed  Musculoskeletal:normal gait  Normal heel toe and tip toe walking  Significant pain with cervical facet loading bilaterally and with lateral spine rotation right greater than left  Decreased range of motion with right-sided lateral rotation of neck  TTP over right-sided cervical paraspinal muscles  Negative nas's test, negative gaenslen's negative SIJ loading bilaterally  Imaging    MRI CERVICAL SPINE WITHOUT CONTRAST     INDICATION: M54 2: Cervicalgia  Neck pain radiating into the right shoulder and down the arm      COMPARISON:  None      TECHNIQUE:  Sagittal T1, sagittal T2, sagittal inversion recovery, axial T2, axial  2D merge     IMAGE QUALITY:  Diagnostic     FINDINGS:     ALIGNMENT:  Normal alignment of the cervical spine  No compression fracture  No subluxation  No scoliosis      MARROW SIGNAL:  Scattered degenerative endplate changes  No focally suspicious marrow lesions    No bone marrow edema or compression abnormality      CERVICAL AND VISUALIZED THORACIC CORD:  Subtle foci of cord signal at the superior endplate of C6 as well as the superior endplate of C7 noted without cord expansion likely small foci of myelomalacia      PREVERTEBRAL AND PARASPINAL SOFT TISSUES:  Normal      VISUALIZED POSTERIOR FOSSA:  The visualized posterior fossa demonstrates no abnormal signal      CERVICAL DISC SPACES:     C2-C3:  Normal      C3-C4:  There is a disc osteophyte complex with a superimposed left neural foraminal disc protrusion  Moderate left neural foraminal narrowing  Mild to moderate central canal narrowing  Mild right neural foraminal narrowing      C4-C5:  Small right paracentral distribution  Mild central canal narrowing  Mild right neural foraminal narrowing  Left neural foramen patent      C5-C6:  There is loss of disc height and signal   There is a disc osteophyte complex with a superimposed left neural foraminal disc protrusion  Mild to moderate central canal narrowing  Moderate to severe left neural foraminal narrowing  Mild right   neural foraminal narrowing      C6-C7:  There is a disc osteophyte complex with a superimposed right neural foraminal disc protrusion  Moderate to severe central canal narrowing  Severe right neural foraminal narrowing  Moderate left neural foraminal narrowing      C7-T1:  Right neural foraminal disc protrusion  Moderate right neural foraminal narrowing  Central canal minimally narrowed  Mild left neural foraminal narrowing      UPPER THORACIC DISC SPACES:  Normal      IMPRESSION:     1   Multilevel spondylosis most pronounced at C5-C6 and C6-C7   2   Subtle foci of cord signal abnormality in the lower cervical cord at the superior endplates of C6 and C7 without cord expansion, likely small foci of myelomalacia        Cervical spondylosis and degenerative disc disease seen on x-ray of of cervical spine

## 2022-04-06 ENCOUNTER — TELEPHONE (OUTPATIENT)
Dept: PAIN MEDICINE | Facility: CLINIC | Age: 62
End: 2022-04-06

## 2022-04-07 ENCOUNTER — APPOINTMENT (OUTPATIENT)
Dept: RADIOLOGY | Facility: HOSPITAL | Age: 62
End: 2022-04-07
Payer: MEDICARE

## 2022-04-07 ENCOUNTER — HOSPITAL ENCOUNTER (OUTPATIENT)
Facility: HOSPITAL | Age: 62
Setting detail: OUTPATIENT SURGERY
Discharge: HOME | End: 2022-04-07
Attending: ANESTHESIOLOGY | Admitting: ANESTHESIOLOGY
Payer: MEDICARE

## 2022-04-07 VITALS
HEART RATE: 72 BPM | OXYGEN SATURATION: 97 % | RESPIRATION RATE: 20 BRPM | TEMPERATURE: 98.1 F | DIASTOLIC BLOOD PRESSURE: 74 MMHG | SYSTOLIC BLOOD PRESSURE: 152 MMHG

## 2022-04-07 LAB
GLUCOSE SERPL-MCNC: 256 MG/DL (ref 65–140)
GLUCOSE SERPL-MCNC: 284 MG/DL (ref 65–140)

## 2022-04-07 PROCEDURE — 62321 NJX INTERLAMINAR CRV/THRC: CPT | Performed by: ANESTHESIOLOGY

## 2022-04-07 PROCEDURE — 82948 REAGENT STRIP/BLOOD GLUCOSE: CPT

## 2022-04-07 RX ORDER — LIDOCAINE HYDROCHLORIDE 10 MG/ML
5 INJECTION, SOLUTION EPIDURAL; INFILTRATION; INTRACAUDAL; PERINEURAL ONCE
Status: DISCONTINUED | OUTPATIENT
Start: 2022-04-07 | End: 2022-04-07 | Stop reason: HOSPADM

## 2022-04-07 RX ORDER — 0.9 % SODIUM CHLORIDE 0.9 %
1 VIAL (ML) INJECTION ONCE
Status: DISCONTINUED | OUTPATIENT
Start: 2022-04-07 | End: 2022-04-07 | Stop reason: HOSPADM

## 2022-04-07 RX ORDER — METHYLPREDNISOLONE ACETATE 80 MG/ML
80 INJECTION, SUSPENSION INTRA-ARTICULAR; INTRALESIONAL; INTRAMUSCULAR; PARENTERAL; SOFT TISSUE ONCE
Status: COMPLETED | OUTPATIENT
Start: 2022-04-07 | End: 2022-04-07

## 2022-04-07 RX ORDER — ALPRAZOLAM 0.5 MG/1
0.5 TABLET ORAL ONCE
Status: COMPLETED | OUTPATIENT
Start: 2022-04-07 | End: 2022-04-07

## 2022-04-07 RX ORDER — LIDOCAINE HYDROCHLORIDE 10 MG/ML
INJECTION, SOLUTION EPIDURAL; INFILTRATION; INTRACAUDAL; PERINEURAL AS NEEDED
Status: DISCONTINUED | OUTPATIENT
Start: 2022-04-07 | End: 2022-04-07 | Stop reason: HOSPADM

## 2022-04-07 RX ADMIN — ALPRAZOLAM 0.5 MG: 0.5 TABLET ORAL at 12:33

## 2022-04-07 RX ADMIN — INSULIN HUMAN 7 UNITS: 100 INJECTION, SOLUTION PARENTERAL at 12:41

## 2022-04-07 NOTE — DISCHARGE INSTRUCTIONS
YOUR 2 WEEK FOLLOW UP HAS BEEN SCHEDULED; IF YOU WISH TO CHANGE THE FOLLOW UP, PLEASE CALL THE [facility] AT [place]: [phone]      Epidural Steroid Injection   WHAT YOU NEED TO KNOW:   An epidural steroid injection (YARI) is a procedure to inject steroid medicine into the epidural space  The epidural space is between your spinal cord and vertebrae  Steroids reduce inflammation and fluid buildup in your spine that may be causing pain  You may be given pain medicine along with the steroids  DISCHARGE INSTRUCTIONS:   Call your local emergency number (911 in the [facility]) if:   · You have a seizure  · You have trouble moving your legs  Seek care immediately if:   · Blood soaks through your bandage  · You have a fever or chills, severe back pain, and the procedure area is sensitive to the touch  · You cannot control when you urinate or have a bowel movement  Call your doctor if:   · You have weakness or numbness in your legs  · Your wound is red, swollen, or draining pus  · You have nausea or are vomiting  · Your face or neck is red and you feel warm  · You have more pain than you had before the procedure  · You have swelling in your hands or feet  · You have questions or concerns about your condition or care  Care for your wound as directed: You may remove the bandage before you go to bed the day of your procedure  You may take a shower, but do not take a bath for at least 24 hours  Self-care:   · Do not drive,  use machines, or do strenuous activity for 24 hours after your procedure or as directed  · Continue other treatments  as directed  Steroid injections alone will not control your pain  The injections are meant to be used with other treatments, such as physical therapy  Follow up with your doctor as directed:  Write down your questions so you remember to ask them during your visits     © Copyright [employer] 2022 Information is for [employer] use only and may not be sold, redistributed or otherwise used for commercial purposes  All illustrations and images included in CareNotes® are the copyrighted property of A D A M , Inc  or Irma Becker  The above information is an  only  It is not intended as medical advice for individual conditions or treatments  Talk to your doctor, nurse or pharmacist before following any medical regimen to see if it is safe and effective for you

## 2022-04-07 NOTE — OP NOTE
OPERATIVE REPORT  PATIENT NAME: Meryle Pluck    :  1960  MRN: [id number]  Pt Location:  GI ROOM 01    SURGERY DATE: 2022    Surgeon(s) and Role:      Rowdy Allen MD - Primary    Preop Diagnosis:  Cervical radiculopathy [M54 12]    Post-Op Diagnosis Codes:     * Cervical radiculopathy [M54 12]    Procedure(s) (LRB):  BLOCK / INJECTION EPIDURAL STEROID CERVICAL C7-T1 (N/A)    Specimen(s):  * No specimens in log *    Estimated Blood Loss:   Minimal    Drains:  * No LDAs found *    Anesthesia Type:   Local    Operative Indications:  Cervical radiculopathy [M54 12]    Operative Findings:  C7-T1 epidurogram    Complications:   None    Procedure and Technique:  Please see detailed procedure note     I was present for the entire procedure    Patient Disposition:  PACU     SIGNATURE: Debbie Morris MD  DATE: 2022  TIME: 1:07 PM

## 2022-04-07 NOTE — INTERVAL H&P NOTE
H&P reviewed  After examining the patient discussed symptoms of hyperglycemia, increased all cause mortality and end organ damage  Counseled that she needs to monitor her BS for the next 3-5 days; if she develops sxs of hyperglycemia she will come to ED for treatent  She will f/u with her pcp to discuss insulin control; recent hgbA1c 7 2%  understands risks of steroid admin and increased BS    Vitals:    04/07/22 1230   BP: 146/72   Pulse: 77   Resp: 20   Temp: 98 8 °F (37 1 °C)   SpO2: 99%

## 2022-04-07 NOTE — PROCEDURES
Pre-procedure Diagnosis: Cervical Radiculopathy  Post-procedure Diagnosis: Cervical Radiculopathy  Procedure Title(s):  1  C7-T1 interlaminar epidural steroid injection      2  Intraoperative fluoroscopy  Attending Surgeon:   River Zarate MD  Anesthesia:   Local     Indications: The patient is a 62y o  year-old female with a diagnosis of Cervical Radiculopathy  The patient's history and physical exam were reviewed  The risks, benefits and alternatives to the procedure were discussed, and all questions were answered to the patient's satisfaction  discussed symptoms of hyperglycemia, increased all cause mortality and end organ damage  Counseled that she needs to monitor her BS for the next 3-5 days; if she develops sxs of hyperglycemia she will come to ED for treatent  She will f/u with her pcp to discuss insulin control; recent hgbA1c 7 2%;   Given immense pain will proceed with elective procedure; patient understands risks of steroid admin and increased BS  7 units sucutanous insulin administered  The patient agreed to proceed, and written informed consent was obtained  Procedure in Detail: The patient was brought into the procedure room and placed in the prone position on the fluoroscopy table  The area of the cervical spine was prepped with chlorhexidine gluconate solution times one and draped in a sterile manner  The C7-T1 interspace was identified and marked under AP fluoroscopy  The skin and subcutaneous tissues in the area were anesthetized with 1% lidocaine  A 18-gauge Tuohy epidural needle was directed toward the interspace under fluoroscopic guidance until the ligamentum flavum was engaged  The C-arm was oblique to the right to obtain a contra-lateral oblique view  From this point, a loss of resistance technique with saline was used to identify entrance of the needle into the epidural space   Once an appropriate loss was obtained, negative aspiration was confirmed, and 2 ml Omnipaque 240 contrast solution was injected  An appropriate epidurogram was noted  Then, after negative aspiration, a solution consisting of 1-mL depo-medrol (80mg/mL) and 2-mL preservative-free saline was easily injected  The needle was removed with a 1% lidocaine flush  The patient's back was cleaned and a bandage was placed over the site of needle insertion  Disposition: The patient tolerated the procedure well, and there were no apparent complications  The patient was taken to the recovery area where written discharge instructions for the procedure were given  Post op instructions were reviewed as per sxs of hyperglycemia; patient told to come to ED, seek help immediately if sugar exceeds 300 or develops symptoms of hyperglycemia  post op        Estimated Blood Loss: None  Specimens Obtained: N/A

## 2022-04-14 ENCOUNTER — TELEPHONE (OUTPATIENT)
Dept: PAIN MEDICINE | Facility: CLINIC | Age: 62
End: 2022-04-14

## 2022-04-14 NOTE — TELEPHONE ENCOUNTER
Pt pain level is a 8/10 with tingling and pain from her chicken wing up into her neck    Pt # [phone]

## 2022-04-20 ENCOUNTER — DOCTOR'S OFFICE (OUTPATIENT)
Dept: URBAN - NONMETROPOLITAN AREA CLINIC 1 | Facility: CLINIC | Age: 62
Setting detail: OPHTHALMOLOGY
End: 2022-04-20
Payer: COMMERCIAL

## 2022-04-20 DIAGNOSIS — H43.812: ICD-10-CM

## 2022-04-20 DIAGNOSIS — Z96.1: ICD-10-CM

## 2022-04-20 DIAGNOSIS — E11.9: ICD-10-CM

## 2022-04-20 DIAGNOSIS — H04.122: ICD-10-CM

## 2022-04-20 DIAGNOSIS — H04.121: ICD-10-CM

## 2022-04-20 DIAGNOSIS — H40.013: ICD-10-CM

## 2022-04-20 PROBLEM — H52.13 MYOPIA; BOTH EYES: Status: ACTIVE | Noted: 2017-01-24

## 2022-04-20 PROCEDURE — 99214 OFFICE O/P EST MOD 30 MIN: CPT | Performed by: OPHTHALMOLOGY

## 2022-04-20 PROCEDURE — 92083 EXTENDED VISUAL FIELD XM: CPT | Performed by: OPHTHALMOLOGY

## 2022-04-20 PROCEDURE — 92134 CPTRZ OPH DX IMG PST SGM RTA: CPT | Performed by: OPHTHALMOLOGY

## 2022-04-20 PROCEDURE — 76514 ECHO EXAM OF EYE THICKNESS: CPT | Performed by: OPHTHALMOLOGY

## 2022-04-20 ASSESSMENT — PACHYMETRY
OD_CT_CORRECTION: -2
OS_CT_UM: 584
OD_CT_UM: 575
OS_CT_CORRECTION: -3

## 2022-04-20 ASSESSMENT — REFRACTION_AUTOREFRACTION
OD_CYLINDER: -1.00
OS_AXIS: 076
OD_AXIS: 118
OS_CYLINDER: -0.50
OD_SPHERE: -1.00
OS_SPHERE: -0.75

## 2022-04-20 ASSESSMENT — REFRACTION_CURRENTRX
OS_AXIS: 97
OD_AXIS: 104
OD_OVR_VA: 20/
OD_SPHERE: -8.75
OS_SPHERE: -7.50
OD_VPRISM_DIRECTION: SV
OS_OVR_VA: 20/
OS_CYLINDER: -1.75
OS_VPRISM_DIRECTION: SV
OD_CYLINDER: -1.50

## 2022-04-20 ASSESSMENT — SPHEQUIV_DERIVED
OS_SPHEQUIV: -1
OD_SPHEQUIV: -1.5

## 2022-04-20 ASSESSMENT — VISUAL ACUITY
OS_BCVA: 20/60+1
OD_BCVA: 20/25

## 2022-04-20 ASSESSMENT — TONOMETRY: OD_IOP_MMHG: 19

## 2022-04-20 ASSESSMENT — CONFRONTATIONAL VISUAL FIELD TEST (CVF)
OS_FINDINGS: FULL
OD_FINDINGS: FULL

## 2022-06-19 ENCOUNTER — TELEPHONE (OUTPATIENT)
Dept: OTHER | Facility: OTHER | Age: 62
End: 2022-06-19

## 2022-09-02 ENCOUNTER — OFFICE VISIT (OUTPATIENT)
Dept: PAIN MEDICINE | Facility: CLINIC | Age: 62
End: 2022-09-02
Payer: MEDICARE

## 2022-09-02 VITALS
HEIGHT: 68 IN | HEART RATE: 76 BPM | SYSTOLIC BLOOD PRESSURE: 120 MMHG | BODY MASS INDEX: 28.89 KG/M2 | DIASTOLIC BLOOD PRESSURE: 70 MMHG | TEMPERATURE: 97.4 F | RESPIRATION RATE: 20 BRPM

## 2022-09-02 DIAGNOSIS — M47.812 CERVICAL SPONDYLOSIS: ICD-10-CM

## 2022-09-02 DIAGNOSIS — M47.812 CERVICAL FACET JOINT SYNDROME: Primary | ICD-10-CM

## 2022-09-02 PROCEDURE — 99214 OFFICE O/P EST MOD 30 MIN: CPT | Performed by: ANESTHESIOLOGY

## 2022-09-02 RX ORDER — BUPIVACAINE HCL/PF 2.5 MG/ML
10 VIAL (ML) INJECTION ONCE
Status: CANCELLED | OUTPATIENT
Start: 2022-09-02 | End: 2022-09-02

## 2022-09-02 RX ORDER — METHYLPREDNISOLONE 4 MG/1
TABLET ORAL
COMMUNITY
Start: 2022-08-24 | End: 2022-09-26

## 2022-09-02 RX ORDER — FENOFIBRATE 48 MG/1
TABLET, COATED ORAL
COMMUNITY
Start: 2022-08-15 | End: 2022-09-02

## 2022-09-02 RX ORDER — LIDOCAINE HYDROCHLORIDE 10 MG/ML
10 INJECTION, SOLUTION EPIDURAL; INFILTRATION; INTRACAUDAL; PERINEURAL ONCE
Status: CANCELLED | OUTPATIENT
Start: 2022-09-02 | End: 2022-09-02

## 2022-09-02 RX ORDER — METHYLPREDNISOLONE ACETATE 80 MG/ML
80 INJECTION, SUSPENSION INTRA-ARTICULAR; INTRALESIONAL; INTRAMUSCULAR; PARENTERAL; SOFT TISSUE ONCE
Status: CANCELLED | OUTPATIENT
Start: 2022-09-02 | End: 2022-09-02

## 2022-09-02 NOTE — PATIENT INSTRUCTIONS
Cervical Facet Block   WHAT YOU NEED TO KNOW:   A cervical facet block is a procedure to inject medicine at the facet joints in your cervical (neck) spine  Facet joints are found at the back of each vertebrae  HOW TO PREPARE:   The week before your procedure:   Arrange to have someone drive you home after your procedure  Tell your healthcare provider about all the medicines you currently take  He or she will tell you if you need to stop any medicine before the procedure, and when to stop  He or she will tell you which medicines to take or not take on the day of the procedure  Tell your provider about all your allergies  Tell him or her if you had an allergic reaction to anesthesia, antibiotics, or contrast liquid  You may need to have blood and urine tests  Tests such as x-rays, a CT scan, or an MRI may also be done  The night before your procedure: You may be told not to eat or drink anything after midnight  The day of your procedure: Take only the medicines your healthcare provider told you to take  You or a close family member will be asked to sign a legal document called a consent form  It gives healthcare providers permission to do the procedure or surgery  It also explains the problems that may happen, and your choices  Make sure all your questions are answered before you sign this form  Healthcare providers may insert an intravenous tube (IV) into your vein  A vein in the arm is usually chosen  Through the IV tube, you may be given liquids and medicine  An anesthesiologist will talk to you before your surgery  You may need medicine to keep you asleep or numb an area of your body during surgery  Tell healthcare providers if you or anyone in your family has had a problem with anesthesia in the past     WHAT WILL HAPPEN:   What will happen: You will lie on your stomach, with your head and body slightly turned to the side   Your healthcare provider will insert a thin needle near your cervical spine and into the facet joint  He or she will use an x-ray with contrast liquid or a CT scan to help guide the needle  Your provider will place the needle tip inside or just outside the facet joint and inject the medicine  The medicine may include steroids and anesthesia  Your healthcare provider may inject medicine into more than one area  Bandages will be placed over the areas where the needles were inserted  After your procedure: You will be taken to a recovery room to rest  Healthcare providers will watch you closely for any problems  Do not get out of bed until your healthcare provider says it is okay  When healthcare providers see that you are okay, you may be able to go home  Bandages will cover the procedure area  The bandages keep the area clean and dry to prevent infection  A healthcare provider may remove the bandages soon after your procedure to check the injection sites  Medicines may be given to treat pain, swelling, or fever, or to prevent an infection  CONTACT YOUR HEALTHCARE PROVIDER IF:   You have a fever  You have a skin infection or an infected wound on or near the back of your neck  You have questions or concerns about your procedure  RISKS:   You may have bleeding at the injection site  Nerves, blood vessels, ligaments, muscles, and bones near your spine may be damaged  The medicine may spread past the facet joint and cause numbness in other areas  You may have trouble breathing  Even after you have this procedure, you may still have shoulder or back pain  You may also develop a headache from the procedure  CARE AGREEMENT:   You have the right to help plan your care  Learn about your health condition and how it may be treated  Discuss treatment options with your healthcare providers to decide what care you want to receive  You always have the right to refuse treatment     © Copyright [address] 2022 Information is for End User's use only and may not be sold, redistributed or otherwise used for commercial purposes  All illustrations and images included in CareNotes® are the copyrighted property of A D [employer] M [employer]  or Irma Becker  The above information is an  only  It is not intended as medical advice for individual conditions or treatments  Talk to your doctor, nurse or pharmacist before following any medical regimen to see if it is safe and effective for you  Cervical Radiofrequency Ablation   WHAT YOU NEED TO KNOW:   What do I need to know about  cervicalradiofrequency ablation? cervicalradiofrequency ablation (RFA) is a procedure used to treat facet joint pain in your  neck  Facet joints are found at the back of each vertebra  A needle electrode is used to send electrical currents to the nerves in your facet joint  The electrical currents create heat that damages the nerve so it cannot send pain signals  How do I prepare for cervical RFA? Your healthcare provider will talk to you about how to prepare for this procedure  He may tell you not to eat or drink anything after midnight on the day of your procedure  He will tell you what medicines to take or not take on the day of your procedure  What will happen during cervical RFA? You will lie on your stomach  You will be given local anesthesia to numb the area of your  Neck where the needle electrode will be inserted  You may be given a sedative to help keep you relaxed  You may still feel pressure or pushing during the procedure, but you should not feel any pain  Your healthcare provider will use fluoroscopy (a type of x-ray) to guide the needle electrode to the nerves near your facet joint  Your healthcare provider may touch the affected nerve to make sure the needle electrode is in the right place  You will feel tingling or pressure when he does this  He will then apply local anesthesia to the nerve to numb it   This will prevent you from feeling pain when he applies heat to the nerve  Your healthcare provider will then apply heat to the nerve using the needle electrode  He may need to apply heat to more than one nerve  He will remove the needle electrode and apply a bandage over the area  What are the risks of  cervical RFA? You may have pain, numbness, tingling, or burning in the area where the lumbar RFA was done  These normally go away within 6 weeks  The needle electrode may injure your spinal nerves  This may cause permanent arm weakness or nerve pain  CARE AGREEMENT:   You have the right to help plan your care  Learn about your health condition and how it may be treated  Discuss treatment options with your healthcare providers to decide what care you want to receive  You always have the right to refuse treatment  The above information is an  only  It is not intended as medical advice for individual conditions or treatments  Talk to your doctor, nurse or pharmacist before following any medical regimen to see if it is safe and effective for you  © Copyright [address] Information is for End User's use only and may not be sold, redistributed or otherwise used for commercial purposes   All illustrations and images included in CareNotes® are the copyrighted property of A D A M , Inc  or [address] [place] [address]

## 2022-09-02 NOTE — H&P (VIEW-ONLY)
Assessment  1  Cervical facet joint syndrome  -     Case request operating room: BLOCK MEDIAL BRANCH C4, C5, C6 #1; Standing  -     Case request operating room: BLOCK MEDIAL BRANCH C4, C5, C6 #1    2  Cervical spondylosis  -     Case request operating room: BLOCK MEDIAL BRANCH C4, C5, C6 #1; Standing  -     Case request operating room: BLOCK MEDIAL BRANCH C4, C5, C6 #1    Chronic left-sided neck pain described primarily by arthritic features  Degenerative disc disease and spondylosis contributory  Imaging showing prominent spondylosis C4, C5, C6, C7 levels  Facet loading maneuvers positive  Denies any headaches  Reasonable to proceed with conservative multimodal pain therapy  NSAIDs relatively contraindicated secondary to renal dysfunction  Right-sided C3-C6 medial branch blocks #1 and #2 provided greater than 80% relief for a period of 5  And 1 days respectively in the past with improved ability to participate with independent activities of daily living  Great success with subsequent radiofrequency ablation  Risks, benefits and alternatives to mbb, RFA for left neck thoroughly discussed, handouts provided and questions answered to patient satisfaction  Of note there is significant C6-C7 stenosis for which she has been referred to [address] [place] [address] for  Denies gait instability, saddle anesthesia, bowel/bladder abnormality    Plan  -left C4, C5, C6 medial branch blocks #1; follow-up 2 weeks post procedure  -Flexeril 5 mg t i d  p r n  Pain prescribed  Has since discontinued  Counseled against operating heavy machinery or driving while under the influence of this medication  States that she will use just at night    - Referral to [address] [place] [address] for surgical intervention given moderate to severe spinal stenosis seen at c5-6 and c6-7 with myelopathic signs on physical exam   - Counseled patient with regard to red flag signs, she will contact our office or seek emergency care should sudden weakness, bowel or bladder abnormality occur     There are risks associated with opioid medications, including dependence, addiction and tolerance  The patient understands and agrees to use these medications only as prescribed  Potential side effects of the medications include, but are not limited to, constipation, drowsiness, addiction, impaired judgment and risk of fatal overdose if not taken as prescribed  The patient was warned against driving while taking sedation medications  Sharing medications is a felony  At this point in time, the patient is showing no signs of addiction, abuse, diversion or suicidal ideation  [place] Prescription Drug Monitoring Program report was reviewed and was appropriate     Complete risks and benefits including bleeding, infection, tissue reaction, nerve injury and allergic reaction were discussed  The approach was demonstrated using models and literature was provided  Verbal and written consent was obtained  My impressions and treatment recommendations were discussed in detail with patient who verbalized understanding and had no further questions  Discharge instructions were provided  I personally saw and examined the patient and I agree with the above discussed plan of care  New Medications Ordered This Visit   Medications    methylPREDNISolone 4 MG tablet therapy pack       History of Present Illness    Sammie Silva is a 62 y o  female presenting with left-sided neck pain described primarily arthritic features  She presents with progressive neck pain described that is arthritic in nature for the past few years  The patient describes predominantly aching, nagging, indolent crampy, stabbing axial neck pain with very occasional radicular features of electric shock-like and shooting pain in the right upper extremity following the C6 dermatomal distribution occasionally accompanied by weakness numbness and paresthesias    The pain is 8/10 nature and is worse with overhead maneuvers as well as lateral rotation and extension of the neck  The patient has been to physical therapy, and has failed conservative medical management including Cymbalta 60 mg per day  The pain is significant source of disability and compromises independent activities of daily living as well as overall function  The patient has difficulty with sleep disturbance as well since the pain often wakes her up  The patient has been to physical therapy with limited benefit;  Right-sided C3-C6 medial branch blocks #1 and #2 provided greater than 80% relief for a period of 5 and 1 days respectively with improved ability to participate with independent activities of daily living  Great benefit from subsequent radiofrequency ablation  I have personally reviewed and/or updated the patient's past medical history, past surgical history, family history, social history, current medications, allergies, and vital signs today  Review of Systems   Constitutional: Positive for activity change  HENT: Negative  Eyes: Negative  Respiratory: Negative  Cardiovascular: Negative  Gastrointestinal: Negative  Endocrine: Negative  Genitourinary: Negative  Musculoskeletal: Positive for arthralgias, myalgias, neck pain and neck stiffness  Skin: Negative  Allergic/Immunologic: Negative  Neurological: Negative for weakness and numbness  Hematological: Negative  Psychiatric/Behavioral: Negative  All other systems reviewed and are negative  Patient Active Problem List   Diagnosis    Cervical facet joint syndrome    Depression    Hypertension    Fibromyalgia, primary    [facility]    Osteoarthritis    GERD (gastroesophageal reflux disease)    Chronic pain disorder    Bipolar 1 disorder (HCC)    Cervical radiculopathy    Stage 3a chronic kidney disease ([facility] )       Past Medical History:   Diagnosis Date    Ambulates with cane     Pt states will use intermittently when fibromyalgia is really bad      Chronic kidney disease     Pt donated one of her kidneys to her spouse  Only has one      Chronic pain disorder     Depression     Diabetes mellitus ([address] )     Takes amaryl daily before breakfast    Fibromyalgia, primary     GERD (gastroesophageal reflux disease)     Hypertension     Osteoarthritis        Past Surgical History:   Procedure Laterality Date    CATARACT EXTRACTION Bilateral      SECTION      2 emergency procedures    EPIDURAL BLOCK INJECTION N/A 6/15/2021    Procedure: C7 T1 CERVICAL EPIDURAL STEROID INJECTION;  Surgeon: Katrina Ceja MD;  [facility];  Service: Pain Management     EPIDURAL BLOCK INJECTION N/A 2022    Procedure: BLOCK / INJECTION EPIDURAL STEROID CERVICAL C7-T1;  Surgeon: Katrina Ceja MD;  [facility];  Service: Pain Management     HYSTERECTOMY      Pt only has fallopian tube on the right    KIDNEY SURGERY Right     "gave a kidney"    NERVE BLOCK Right 2020    Procedure: C3 C4 C5 C6 MEDIAL BRANCH BLOCK #1;  Surgeon: Katrina Ceja MD;  [facility];  Service: Pain Management     NERVE BLOCK Right 3/23/2021    Procedure: C3 C4 C5 C6 MEDIAL BRANCH BLOCK #2;  Surgeon: Katrina Ceja MD;  [facility];  Service: Pain Management     RADIOFREQUENCY ABLATION Right 2021    Procedure: C3 C4 C5 C6 RADIO FREQUENCY ABLATION right side;  Surgeon: Katrina Ceja MD;  [facility];  Service: Pain Management     TONSILECTOMY AND ADNOIDECTOMY      TONSILLECTOMY      WISDOM TOOTH EXTRACTION      WRIST SURGERY Right     Carpal tunnel       Family History   Problem Relation Age of Onset    Hypertension Mother     No Known Problems Father        Social History     Occupational History    Not on file   Tobacco Use    Smoking status: Light Tobacco Smoker     Years: 35 00     Types: Cigarettes    Smokeless tobacco: Never Used    Tobacco comment: "only smoke when I drink"   Vaping Use    Vaping Use: Never used Substance and Sexual Activity    Alcohol use: Yes     Alcohol/week: 2 0 - 4 0 standard drinks     Types: 2 - 4 Glasses of wine per week     Comment: "who counts"    Drug use: Yes     Frequency: 1 0 times per week     Types: Marijuana     Comment: "For the pain"    Sexual activity: Not on file     Comment: did not ask       Current Outpatient Medications on File Prior to Visit   Medication Sig    amLODIPine (NORVASC) 10 mg tablet Take 10 mg by mouth daily    ASPIRIN 81 PO Take 81 mg by mouth daily    Azelastine HCl 0 15 % SOLN     colchicine (COLCRYS) 0 6 mg tablet     cycloSPORINE (RESTASIS) 0 05 % ophthalmic emulsion Administer 1 drop to both eyes 2 (two) times a day    DULoxetine (CYMBALTA) 60 mg delayed release capsule Take 100 mg by mouth daily     famotidine (PEPCID) 20 mg tablet Take 20 mg by mouth 2 (two) times a day    fluticasone (FLONASE) 50 mcg/act nasal spray 2 sprays into each nostril as needed     glimepiride (AMARYL) 1 mg tablet Take 1 mg by mouth every morning before breakfast    methylPREDNISolone 4 MG tablet therapy pack     metoprolol tartrate (LOPRESSOR) 50 mg tablet Take 50 mg by mouth every 12 (twelve) hours    polyethylene glycol (GLYCOLAX) 17 GM/SCOOP powder Take 17 g by mouth daily    QUEtiapine (SEROquel) 100 mg tablet Take 100 mg by mouth daily at bedtime    rosuvastatin (CRESTOR) 5 mg tablet Take 5 mg by mouth daily    Rybelsus 7 MG TABS     valsartan (DIOVAN) 320 MG tablet     Vascepa 1 g CAPS     [DISCONTINUED] cyclobenzaprine (FLEXERIL) 5 mg tablet Take 1 tablet (5 mg total) by mouth 3 (three) times a day as needed for muscle spasms    [DISCONTINUED] fenofibrate (TRICOR) 48 mg tablet      No current facility-administered medications on file prior to visit  Allergies   Allergen Reactions    Lisinopril-Hydrochlorothiazide Throat Swelling    Penicillins Hives    Nsaids Other (See Comments)     Pt only has one kidney- should not take NSAIDS  Physical Exam    /70   Pulse 76   Temp (!) 97 4 °F (36 3 °C)   Resp 20   Ht 5' 8" (1 727 m)   BMI 28 89 kg/m²     Constitutional: normal, well developed, well nourished, alert, in no distress and non-toxic and no overt pain behavior  and obese  Eyes: anicteric  HEENT: grossly intact  Neck: supple, symmetric, trachea midline and no masses   Pulmonary:even and unlabored  Cardiovascular:No edema or pitting edema present  Skin:Normal without rashes or lesions and well hydrated  Psychiatric:Mood and affect appropriate  Neurologic:Cranial Nerves II-XII grossly intact Sensation grossly intact; clonus with + clayton's  Reflexes 3+ and brisk in upper extremeties  SLR negative bilaterally  Spurling's maneuver not performed  Musculoskeletal:normal gait  Normal heel toe and tip toe walking  Significant pain with cervical facet loading bilaterally and with lateral spine rotation left greater than right  Decreased range of motion with left-sided lateral rotation of neck  TTP over left-sided cervical paraspinal muscles  Negative nas's test, negative gaenslen's negative SIJ loading bilaterally  Imaging    MRI CERVICAL SPINE WITHOUT CONTRAST     INDICATION: M54 2: Cervicalgia  Neck pain radiating into the right shoulder and down the arm      COMPARISON:  None      TECHNIQUE:  Sagittal T1, sagittal T2, sagittal inversion recovery, axial T2, axial  2D merge     IMAGE QUALITY:  Diagnostic     FINDINGS:     ALIGNMENT:  Normal alignment of the cervical spine  No compression fracture  No subluxation  No scoliosis      MARROW SIGNAL:  Scattered degenerative endplate changes  No focally suspicious marrow lesions    No bone marrow edema or compression abnormality      CERVICAL AND VISUALIZED THORACIC CORD:  Subtle foci of cord signal at the superior endplate of C6 as well as the superior endplate of C7 noted without cord expansion likely small foci of myelomalacia      PREVERTEBRAL AND PARASPINAL SOFT TISSUES:  Normal      VISUALIZED POSTERIOR FOSSA:  The visualized posterior fossa demonstrates no abnormal signal      CERVICAL DISC SPACES:     C2-C3:  Normal      C3-C4:  There is a disc osteophyte complex with a superimposed left neural foraminal disc protrusion  Moderate left neural foraminal narrowing  Mild to moderate central canal narrowing  Mild right neural foraminal narrowing      C4-C5:  Small right paracentral distribution  Mild central canal narrowing  Mild right neural foraminal narrowing  Left neural foramen patent      C5-C6:  There is loss of disc height and signal   There is a disc osteophyte complex with a superimposed left neural foraminal disc protrusion  Mild to moderate central canal narrowing  Moderate to severe left neural foraminal narrowing  Mild right   neural foraminal narrowing      C6-C7:  There is a disc osteophyte complex with a superimposed right neural foraminal disc protrusion  Moderate to severe central canal narrowing  Severe right neural foraminal narrowing  Moderate left neural foraminal narrowing      C7-T1:  Right neural foraminal disc protrusion  Moderate right neural foraminal narrowing  Central canal minimally narrowed  Mild left neural foraminal narrowing      UPPER THORACIC DISC SPACES:  Normal      IMPRESSION:     1   Multilevel spondylosis most pronounced at C5-C6 and C6-C7   2   Subtle foci of cord signal abnormality in the lower cervical cord at the superior endplates of C6 and C7 without cord expansion, likely small foci of myelomalacia        Cervical spondylosis and degenerative disc disease seen on x-ray of of cervical spine

## 2022-09-02 NOTE — PROGRESS NOTES
Assessment  1  Cervical facet joint syndrome  -     Case request operating room: BLOCK MEDIAL BRANCH C4, C5, C6 #1; Standing  -     Case request operating room: BLOCK MEDIAL BRANCH C4, C5, C6 #1    2  Cervical spondylosis  -     Case request operating room: BLOCK MEDIAL BRANCH C4, C5, C6 #1; Standing  -     Case request operating room: BLOCK MEDIAL BRANCH C4, C5, C6 #1    Chronic left-sided neck pain described primarily by arthritic features  Degenerative disc disease and spondylosis contributory  Imaging showing prominent spondylosis C4, C5, C6, C7 levels  Facet loading maneuvers positive  Denies any headaches  Reasonable to proceed with conservative multimodal pain therapy  NSAIDs relatively contraindicated secondary to renal dysfunction  Right-sided C3-C6 medial branch blocks #1 and #2 provided greater than 80% relief for a period of 5  And 1 days respectively in the past with improved ability to participate with independent activities of daily living  Great success with subsequent radiofrequency ablation  Risks, benefits and alternatives to mbb, RFA for left neck thoroughly discussed, handouts provided and questions answered to patient satisfaction  Of note there is significant C6-C7 stenosis for which she has been referred to [address] [place] [address] for  Denies gait instability, saddle anesthesia, bowel/bladder abnormality    Plan  -left C4, C5, C6 medial branch blocks #1; follow-up 2 weeks post procedure  -Flexeril 5 mg t i d  p r n  Pain prescribed  Has since discontinued  Counseled against operating heavy machinery or driving while under the influence of this medication  States that she will use just at night    - Referral to [address] [place] [address] for surgical intervention given moderate to severe spinal stenosis seen at c5-6 and c6-7 with myelopathic signs on physical exam   - Counseled patient with regard to red flag signs, she will contact our office or seek emergency care should sudden weakness, bowel or bladder abnormality occur     There are risks associated with opioid medications, including dependence, addiction and tolerance  The patient understands and agrees to use these medications only as prescribed  Potential side effects of the medications include, but are not limited to, constipation, drowsiness, addiction, impaired judgment and risk of fatal overdose if not taken as prescribed  The patient was warned against driving while taking sedation medications  Sharing medications is a felony  At this point in time, the patient is showing no signs of addiction, abuse, diversion or suicidal ideation  [place] Prescription Drug Monitoring Program report was reviewed and was appropriate     Complete risks and benefits including bleeding, infection, tissue reaction, nerve injury and allergic reaction were discussed  The approach was demonstrated using models and literature was provided  Verbal and written consent was obtained  My impressions and treatment recommendations were discussed in detail with patient who verbalized understanding and had no further questions  Discharge instructions were provided  I personally saw and examined the patient and I agree with the above discussed plan of care  New Medications Ordered This Visit   Medications    methylPREDNISolone 4 MG tablet therapy pack       History of Present Illness    Vincent Aceves is a 62 y o  female presenting with left-sided neck pain described primarily arthritic features  She presents with progressive neck pain described that is arthritic in nature for the past few years  The patient describes predominantly aching, nagging, indolent crampy, stabbing axial neck pain with very occasional radicular features of electric shock-like and shooting pain in the right upper extremity following the C6 dermatomal distribution occasionally accompanied by weakness numbness and paresthesias    The pain is 8/10 nature and is worse with overhead maneuvers as well as lateral rotation and extension of the neck  The patient has been to physical therapy, and has failed conservative medical management including Cymbalta 60 mg per day  The pain is significant source of disability and compromises independent activities of daily living as well as overall function  The patient has difficulty with sleep disturbance as well since the pain often wakes her up  The patient has been to physical therapy with limited benefit;  Right-sided C3-C6 medial branch blocks #1 and #2 provided greater than 80% relief for a period of 5 and 1 days respectively with improved ability to participate with independent activities of daily living  Great benefit from subsequent radiofrequency ablation  I have personally reviewed and/or updated the patient's past medical history, past surgical history, family history, social history, current medications, allergies, and vital signs today  Review of Systems   Constitutional: Positive for activity change  HENT: Negative  Eyes: Negative  Respiratory: Negative  Cardiovascular: Negative  Gastrointestinal: Negative  Endocrine: Negative  Genitourinary: Negative  Musculoskeletal: Positive for arthralgias, myalgias, neck pain and neck stiffness  Skin: Negative  Allergic/Immunologic: Negative  Neurological: Negative for weakness and numbness  Hematological: Negative  Psychiatric/Behavioral: Negative  All other systems reviewed and are negative  Patient Active Problem List   Diagnosis    Cervical facet joint syndrome    Depression    Hypertension    Fibromyalgia, primary    [facility]    Osteoarthritis    GERD (gastroesophageal reflux disease)    Chronic pain disorder    Bipolar 1 disorder (HCC)    Cervical radiculopathy    Stage 3a chronic kidney disease ([facility] )       Past Medical History:   Diagnosis Date    Ambulates with cane     Pt states will use intermittently when fibromyalgia is really bad      Chronic kidney disease     Pt donated one of her kidneys to her spouse  Only has one      Chronic pain disorder     Depression     Diabetes mellitus ([address] )     Takes amaryl daily before breakfast    Fibromyalgia, primary     GERD (gastroesophageal reflux disease)     Hypertension     Osteoarthritis        Past Surgical History:   Procedure Laterality Date    CATARACT EXTRACTION Bilateral      SECTION      2 emergency procedures    EPIDURAL BLOCK INJECTION N/A 6/15/2021    Procedure: C7 T1 CERVICAL EPIDURAL STEROID INJECTION;  Surgeon: Rose Marie Garcia MD;  [facility];  Service: Pain Management     EPIDURAL BLOCK INJECTION N/A 2022    Procedure: BLOCK / INJECTION EPIDURAL STEROID CERVICAL C7-T1;  Surgeon: Rose Marie Garcia MD;  [facility];  Service: Pain Management     HYSTERECTOMY      Pt only has fallopian tube on the right    KIDNEY SURGERY Right     "gave a kidney"    NERVE BLOCK Right 2020    Procedure: C3 C4 C5 C6 MEDIAL BRANCH BLOCK #1;  Surgeon: Rose Marie Garcia MD;  [facility];  Service: Pain Management     NERVE BLOCK Right 3/23/2021    Procedure: C3 C4 C5 C6 MEDIAL BRANCH BLOCK #2;  Surgeon: Rose Marie Garcia MD;  [facility];  Service: Pain Management     RADIOFREQUENCY ABLATION Right 2021    Procedure: C3 C4 C5 C6 RADIO FREQUENCY ABLATION right side;  Surgeon: Rose Marie Garcia MD;  [facility];  Service: Pain Management     TONSILECTOMY AND ADNOIDECTOMY      TONSILLECTOMY      WISDOM TOOTH EXTRACTION      WRIST SURGERY Right     Carpal tunnel       Family History   Problem Relation Age of Onset    Hypertension Mother     No Known Problems Father        Social History     Occupational History    Not on file   Tobacco Use    Smoking status: Light Tobacco Smoker     Years: 35 00     Types: Cigarettes    Smokeless tobacco: Never Used    Tobacco comment: "only smoke when I drink"   Vaping Use    Vaping Use: Never used Substance and Sexual Activity    Alcohol use: Yes     Alcohol/week: 2 0 - 4 0 standard drinks     Types: 2 - 4 Glasses of wine per week     Comment: "who counts"    Drug use: Yes     Frequency: 1 0 times per week     Types: Marijuana     Comment: "For the pain"    Sexual activity: Not on file     Comment: did not ask       Current Outpatient Medications on File Prior to Visit   Medication Sig    amLODIPine (NORVASC) 10 mg tablet Take 10 mg by mouth daily    ASPIRIN 81 PO Take 81 mg by mouth daily    Azelastine HCl 0 15 % SOLN     colchicine (COLCRYS) 0 6 mg tablet     cycloSPORINE (RESTASIS) 0 05 % ophthalmic emulsion Administer 1 drop to both eyes 2 (two) times a day    DULoxetine (CYMBALTA) 60 mg delayed release capsule Take 100 mg by mouth daily     famotidine (PEPCID) 20 mg tablet Take 20 mg by mouth 2 (two) times a day    fluticasone (FLONASE) 50 mcg/act nasal spray 2 sprays into each nostril as needed     glimepiride (AMARYL) 1 mg tablet Take 1 mg by mouth every morning before breakfast    methylPREDNISolone 4 MG tablet therapy pack     metoprolol tartrate (LOPRESSOR) 50 mg tablet Take 50 mg by mouth every 12 (twelve) hours    polyethylene glycol (GLYCOLAX) 17 GM/SCOOP powder Take 17 g by mouth daily    QUEtiapine (SEROquel) 100 mg tablet Take 100 mg by mouth daily at bedtime    rosuvastatin (CRESTOR) 5 mg tablet Take 5 mg by mouth daily    Rybelsus 7 MG TABS     valsartan (DIOVAN) 320 MG tablet     Vascepa 1 g CAPS     [DISCONTINUED] cyclobenzaprine (FLEXERIL) 5 mg tablet Take 1 tablet (5 mg total) by mouth 3 (three) times a day as needed for muscle spasms    [DISCONTINUED] fenofibrate (TRICOR) 48 mg tablet      No current facility-administered medications on file prior to visit  Allergies   Allergen Reactions    Lisinopril-Hydrochlorothiazide Throat Swelling    Penicillins Hives    Nsaids Other (See Comments)     Pt only has one kidney- should not take NSAIDS  Physical Exam    /70   Pulse 76   Temp (!) 97 4 °F (36 3 °C)   Resp 20   Ht 5' 8" (1 727 m)   BMI 28 89 kg/m²     Constitutional: normal, well developed, well nourished, alert, in no distress and non-toxic and no overt pain behavior  and obese  Eyes: anicteric  HEENT: grossly intact  Neck: supple, symmetric, trachea midline and no masses   Pulmonary:even and unlabored  Cardiovascular:No edema or pitting edema present  Skin:Normal without rashes or lesions and well hydrated  Psychiatric:Mood and affect appropriate  Neurologic:Cranial Nerves II-XII grossly intact Sensation grossly intact; clonus with + clayton's  Reflexes 3+ and brisk in upper extremeties  SLR negative bilaterally  Spurling's maneuver not performed  Musculoskeletal:normal gait  Normal heel toe and tip toe walking  Significant pain with cervical facet loading bilaterally and with lateral spine rotation left greater than right  Decreased range of motion with left-sided lateral rotation of neck  TTP over left-sided cervical paraspinal muscles  Negative nas's test, negative gaenslen's negative SIJ loading bilaterally  Imaging    MRI CERVICAL SPINE WITHOUT CONTRAST     INDICATION: M54 2: Cervicalgia  Neck pain radiating into the right shoulder and down the arm      COMPARISON:  None      TECHNIQUE:  Sagittal T1, sagittal T2, sagittal inversion recovery, axial T2, axial  2D merge     IMAGE QUALITY:  Diagnostic     FINDINGS:     ALIGNMENT:  Normal alignment of the cervical spine  No compression fracture  No subluxation  No scoliosis      MARROW SIGNAL:  Scattered degenerative endplate changes  No focally suspicious marrow lesions    No bone marrow edema or compression abnormality      CERVICAL AND VISUALIZED THORACIC CORD:  Subtle foci of cord signal at the superior endplate of C6 as well as the superior endplate of C7 noted without cord expansion likely small foci of myelomalacia      PREVERTEBRAL AND PARASPINAL SOFT TISSUES:  Normal      VISUALIZED POSTERIOR FOSSA:  The visualized posterior fossa demonstrates no abnormal signal      CERVICAL DISC SPACES:     C2-C3:  Normal      C3-C4:  There is a disc osteophyte complex with a superimposed left neural foraminal disc protrusion  Moderate left neural foraminal narrowing  Mild to moderate central canal narrowing  Mild right neural foraminal narrowing      C4-C5:  Small right paracentral distribution  Mild central canal narrowing  Mild right neural foraminal narrowing  Left neural foramen patent      C5-C6:  There is loss of disc height and signal   There is a disc osteophyte complex with a superimposed left neural foraminal disc protrusion  Mild to moderate central canal narrowing  Moderate to severe left neural foraminal narrowing  Mild right   neural foraminal narrowing      C6-C7:  There is a disc osteophyte complex with a superimposed right neural foraminal disc protrusion  Moderate to severe central canal narrowing  Severe right neural foraminal narrowing  Moderate left neural foraminal narrowing      C7-T1:  Right neural foraminal disc protrusion  Moderate right neural foraminal narrowing  Central canal minimally narrowed  Mild left neural foraminal narrowing      UPPER THORACIC DISC SPACES:  Normal      IMPRESSION:     1   Multilevel spondylosis most pronounced at C5-C6 and C6-C7   2   Subtle foci of cord signal abnormality in the lower cervical cord at the superior endplates of C6 and C7 without cord expansion, likely small foci of myelomalacia        Cervical spondylosis and degenerative disc disease seen on x-ray of of cervical spine

## 2022-09-06 ENCOUNTER — TELEPHONE (OUTPATIENT)
Dept: PAIN MEDICINE | Facility: CLINIC | Age: 62
End: 2022-09-06

## 2022-09-06 NOTE — TELEPHONE ENCOUNTER
Pt returned my call to schedule MBB #1  Scheduled 9/15/2022  Pt aware of instructions  Follow up 9/26/2022

## 2022-09-08 ENCOUNTER — HOSPITAL ENCOUNTER (OUTPATIENT)
Dept: ULTRASOUND IMAGING | Facility: HOSPITAL | Age: 62
End: 2022-09-08
Payer: MEDICARE

## 2022-09-08 DIAGNOSIS — E04.1 NONTOXIC SINGLE THYROID NODULE: ICD-10-CM

## 2022-09-08 PROCEDURE — 76536 US EXAM OF HEAD AND NECK: CPT

## 2022-09-15 ENCOUNTER — HOSPITAL ENCOUNTER (OUTPATIENT)
Facility: HOSPITAL | Age: 62
Setting detail: OUTPATIENT SURGERY
Discharge: HOME | End: 2022-09-15
Attending: ANESTHESIOLOGY | Admitting: ANESTHESIOLOGY
Payer: MEDICARE

## 2022-09-15 ENCOUNTER — APPOINTMENT (OUTPATIENT)
Dept: RADIOLOGY | Facility: HOSPITAL | Age: 62
End: 2022-09-15
Payer: MEDICARE

## 2022-09-15 VITALS
BODY MASS INDEX: 28.79 KG/M2 | SYSTOLIC BLOOD PRESSURE: 115 MMHG | DIASTOLIC BLOOD PRESSURE: 72 MMHG | WEIGHT: 190 LBS | OXYGEN SATURATION: 98 % | RESPIRATION RATE: 18 BRPM | TEMPERATURE: 98.2 F | HEART RATE: 78 BPM | HEIGHT: 68 IN

## 2022-09-15 LAB — GLUCOSE SERPL-MCNC: 187 MG/DL (ref 65–140)

## 2022-09-15 PROCEDURE — 64491 INJ PARAVERT F JNT C/T 2 LEV: CPT | Performed by: ANESTHESIOLOGY

## 2022-09-15 PROCEDURE — 82948 REAGENT STRIP/BLOOD GLUCOSE: CPT

## 2022-09-15 PROCEDURE — 64490 INJ PARAVERT F JNT C/T 1 LEV: CPT | Performed by: ANESTHESIOLOGY

## 2022-09-15 PROCEDURE — 77002 NEEDLE LOCALIZATION BY XRAY: CPT

## 2022-09-15 RX ORDER — ALPRAZOLAM 0.5 MG/1
0.5 TABLET ORAL ONCE
Status: COMPLETED | OUTPATIENT
Start: 2022-09-15 | End: 2022-09-15

## 2022-09-15 RX ORDER — LIDOCAINE HYDROCHLORIDE 10 MG/ML
INJECTION, SOLUTION EPIDURAL; INFILTRATION; INTRACAUDAL; PERINEURAL AS NEEDED
Status: DISCONTINUED | OUTPATIENT
Start: 2022-09-15 | End: 2022-09-15 | Stop reason: HOSPADM

## 2022-09-15 RX ORDER — BUPIVACAINE HYDROCHLORIDE 2.5 MG/ML
INJECTION, SOLUTION EPIDURAL; INFILTRATION; INTRACAUDAL AS NEEDED
Status: DISCONTINUED | OUTPATIENT
Start: 2022-09-15 | End: 2022-09-15 | Stop reason: HOSPADM

## 2022-09-15 RX ORDER — DEXAMETHASONE SODIUM PHOSPHATE 10 MG/ML
INJECTION, SOLUTION INTRAMUSCULAR; INTRAVENOUS AS NEEDED
Status: DISCONTINUED | OUTPATIENT
Start: 2022-09-15 | End: 2022-09-15 | Stop reason: HOSPADM

## 2022-09-15 RX ADMIN — ALPRAZOLAM 0.5 MG: 0.5 TABLET ORAL at 07:26

## 2022-09-15 NOTE — INTERVAL H&P NOTE
H&P reviewed  After examining the patient I find no changes in the patients condition since the H&P had been written      Vitals:    09/15/22 0729   BP: 128/70   Pulse: 85   Resp: 18   Temp: 98 4 °F (36 9 °C)   SpO2: 97%

## 2022-09-15 NOTE — OP NOTE
OPERATIVE REPORT  PATIENT NAME: Marlene Goodwin    :  1960  MRN: [id number]  Pt Location:  GI ROOM 01    SURGERY DATE: 9/15/2022    Surgeon(s) and Role:      Amanda Strange MD - Primary    Preop Diagnosis:  Cervical facet joint syndrome [M47 812]  Cervical spondylosis [M47 812]    Post-Op Diagnosis Codes:     * Cervical facet joint syndrome [M47 812]     * Cervical spondylosis [M47 812]    Procedure(s) (LRB):  BLOCK MEDIAL BRANCH C4, C5, C6 #1 (Left)    Specimen(s):  * No specimens in log *    Estimated Blood Loss:   Minimal    Drains:  * No LDAs found *    Anesthesia Type:   Local    Operative Indications:  Cervical facet joint syndrome [M47 812]  Cervical spondylosis [M47 812]    Operative Findings:  Left C4, C5, C6 medial branch nerve regions identified under fluoroscopic guidance    Complications:   None    Procedure and Technique:  Please see detailed procedure note     I was present for the entire procedure    Patient Disposition:  PACU     SIGNATURE: Huntley Boas MD  DATE: September 15, 2022  TIME: 8:21 AM

## 2022-09-15 NOTE — PROCEDURES
Pre-procedure Diagnosis: Cervical Facet Arthropathy  Post-procedure Diagnosis: Cervical Facet Arthropathy  Procedure Title(s):  [LEFT C4, C5, C6] medial branch nerve blocks [(DIAGNOSTIC)]  Attending Surgeon:   Katie Manjarrez MD  Anesthesia:   Local     Indications: The patient is a 62y o  year-old female with a diagnosis of cervical facet arthropathy  The patient's history and physical exam were reviewed  The risks, benefits and alternatives to the procedure were discussed, and all questions were answered to the patient's satisfaction  The patient agreed to proceed, and written informed consent was obtained  Procedure in Detail: The patient was brought into the procedure room and placed in the prone position on the fluoroscopy table  The neck was prepped with chloraprep solution times one and draped in a sterile manner  AP fluoroscopic views were used to identify and sarah the centroid of the mid-articular pillars of the [C4, C5, C6] levels on the [LEFT] side  The skin and subcutaneous tissues in these areas were anesthetized with 1% lidocaine  22-gauge 3 5 inch spinal needles were directed toward the targeted points until bone was contacted  After negative aspiration was confirmed, 0 5ml of a solution of 3mL 0 25% bupivicaine with 1mL Dexamethasone (10mg/mL) was injected at each level  The needles were removed, and the patient's neck was cleaned  Bandages were placed over the points of needle insertion  Disposition: The patient tolerated the procedure well, and there were no apparent complications  The patient was taken to the recovery area where written discharge instructions for the procedure were given  The patient was given a pain diary to determine if the patient's pain improves following the injection  Once the diary is returned we will consider next appropriate course of treatment  Postoperative pain relief [WAS] significant      Estimated Blood Loss: None  Specimens Obtained: N/A

## 2022-09-15 NOTE — DISCHARGE INSTRUCTIONS
YOUR 2 WEEK FOLLOW UP HAS BEEN SCHEDULED; IF YOU WISH TO CHANGE THE FOLLOW UP, PLEASE CALL THE [facility] AT [facility]: [phone]

## 2022-09-26 ENCOUNTER — PREP FOR PROCEDURE (OUTPATIENT)
Dept: PAIN MEDICINE | Facility: CLINIC | Age: 62
End: 2022-09-26

## 2022-09-26 ENCOUNTER — OFFICE VISIT (OUTPATIENT)
Dept: PAIN MEDICINE | Facility: CLINIC | Age: 62
End: 2022-09-26
Payer: MEDICARE

## 2022-09-26 VITALS
BODY MASS INDEX: 28.89 KG/M2 | DIASTOLIC BLOOD PRESSURE: 78 MMHG | HEART RATE: 71 BPM | SYSTOLIC BLOOD PRESSURE: 124 MMHG | TEMPERATURE: 97.8 F | HEIGHT: 68 IN | RESPIRATION RATE: 20 BRPM

## 2022-09-26 DIAGNOSIS — M47.812 CERVICAL SPONDYLOSIS: Primary | ICD-10-CM

## 2022-09-26 PROCEDURE — 99214 OFFICE O/P EST MOD 30 MIN: CPT | Performed by: ANESTHESIOLOGY

## 2022-09-26 RX ORDER — ALLOPURINOL 100 MG/1
TABLET ORAL
COMMUNITY
Start: 2022-09-06

## 2022-09-26 RX ORDER — EMPAGLIFLOZIN 10 MG/1
TABLET, FILM COATED ORAL
COMMUNITY
Start: 2022-09-12

## 2022-09-26 RX ORDER — FENOFIBRATE 48 MG/1
TABLET, COATED ORAL
COMMUNITY
Start: 2022-09-13

## 2022-09-26 NOTE — PATIENT INSTRUCTIONS
Neck Exercises   AMBULATORY CARE:   Neck exercises  help reduce neck pain, and improve neck movement and strength  Neck exercises also help prevent long-term neck problems  What you need to know about neck exercises:   Do the exercises every day,  or as often as directed by your healthcare provider  Move slowly, gently, and smoothly  Avoid fast or jerky motions  Stand and sit the way your healthcare provider shows you  Good posture may reduce your neck pain  Check your posture often, even when you are not doing your neck exercises  How to perform neck exercises safely:   Exercise position:  You may sit or stand while you do neck exercises  Face forward  Your shoulders should be straight and relaxed, with a good posture  Head tilts, forward and back:  Gently bow your head and try to touch your chin to your chest  Your healthcare provider may tell you to push on the back of your neck to help bow your head  Raise your chin back to the starting position  Tilt your head back as far as possible so you are looking up at the ceiling  Your healthcare provider may tell you to lift your chin to help tilt your head back  Return your head to the starting position  Head tilts, side to side:  Tilt your head, bringing your ear toward your shoulder  Then tilt your head toward the other shoulder  Head turns:  Turn your head to look over your shoulder  Tilt your chin down and try to touch it to your shoulder  Do not raise your shoulder to your chin  Face forward again  Do the same on the other side  Head rolls:  Slowly bring your chin toward your chest  Next, roll your head to the right  Your ear should be positioned over your shoulder  Hold this position for 5 seconds  Roll your head back toward your chest and to the left into the same position  Hold for 5 seconds  Gently roll your head back and around in a clockwise [place] 3 times   Next, move your head in the reverse direction (counterclockwise) in a [place] 3 times  Do not shrug your shoulders upwards while you do this exercise  Contact your healthcare provider if:   Your pain does not get better, or gets worse  You have questions or concerns about your condition, care, or exercise program     © Copyright [employer] 2022 Information is for End User's use only and may not be sold, redistributed or otherwise used for commercial purposes  All illustrations and images included in CareNotes® are the copyrighted property of [employer]  or Irma Becker  The above information is an  only  It is not intended as medical advice for individual conditions or treatments  Talk to your doctor, nurse or pharmacist before following any medical regimen to see if it is safe and effective for you

## 2022-09-26 NOTE — PROGRESS NOTES
Assessment  1  Cervical spondylosis    Greater than 80% relief of pain with improved ability to participate with IADLs after left C4, C5, C6 medial branch blocks #1 for a few days  Previously reported the following symptomatology:     Chronic left-sided neck pain described primarily by arthritic features  Degenerative disc disease and spondylosis contributory  Imaging showing prominent spondylosis C4, C5, C6, C7 levels  Facet loading maneuvers positive  Denies any headaches  Reasonable to proceed with conservative multimodal pain therapy  NSAIDs relatively contraindicated secondary to renal dysfunction  Right-sided C3-C6 medial branch blocks #1 and #2 provided greater than 80% relief for a period of 5  And 1 days respectively in the past with improved ability to participate with independent activities of daily living  Great success with subsequent radiofrequency ablation  Risks, benefits and alternatives to mbb, RFA for left neck thoroughly discussed, handouts provided and questions answered to patient satisfaction  Of note there is significant C6-C7 stenosis for which she has been referred to [address] [place] [address] for  Denies gait instability, saddle anesthesia, bowel/bladder abnormality    Plan  -left C4, C5, C6 medial branch blocks #2; follow-up 2 weeks post procedure  -Flexeril 5 mg t i d  p r n  Pain prescribed  Has since discontinued  Counseled against operating heavy machinery or driving while under the influence of this medication  States that she will use just at night   - Counseled patient with regard to red flag signs, she will contact our office or seek emergency care should sudden weakness, bowel or bladder abnormality occur  There are risks associated with opioid medications, including dependence, addiction and tolerance  The patient understands and agrees to use these medications only as prescribed   Potential side effects of the medications include, but are not limited to, constipation, drowsiness, addiction, impaired judgment and risk of fatal overdose if not taken as prescribed  The patient was warned against driving while taking sedation medications  Sharing medications is a felony  At this point in time, the patient is showing no signs of addiction, abuse, diversion or suicidal ideation  [place] Prescription Drug Monitoring Program report was reviewed and was appropriate     Complete risks and benefits including bleeding, infection, tissue reaction, nerve injury and allergic reaction were discussed  The approach was demonstrated using models and literature was provided  Verbal and written consent was obtained  My impressions and treatment recommendations were discussed in detail with patient who verbalized understanding and had no further questions  Discharge instructions were provided  I personally saw and examined the patient and I agree with the above discussed plan of care  New Medications Ordered This Visit   Medications    allopurinol (ZYLOPRIM) 100 mg tablet    Jardiance 10 MG TABS tablet    fenofibrate (TRICOR) 48 mg tablet       History of Present Illness    Greater than 80% relief of pain with improved ability to participate with IADLs after left C4, C5, C6 medial branch blocks #1 for a few days  Previously reported the following symptomatology:     Sammie Silva is a 62 y o  female presenting with left-sided neck pain described primarily arthritic features  She presents with progressive neck pain described that is arthritic in nature for the past few years  The patient describes predominantly aching, nagging, indolent crampy, stabbing axial neck pain with very occasional radicular features of electric shock-like and shooting pain in the right upper extremity following the C6 dermatomal distribution occasionally accompanied by weakness numbness and paresthesias  The pain is 8/10 nature and is worse with overhead maneuvers as well as lateral rotation and extension of the neck  The patient has been to physical therapy, and has failed conservative medical management including Cymbalta 60 mg per day  The pain is significant source of disability and compromises independent activities of daily living as well as overall function  The patient has difficulty with sleep disturbance as well since the pain often wakes her up  The patient has been to physical therapy with limited benefit;  Right-sided C3-C6 medial branch blocks #1 and #2 provided greater than 80% relief for a period of 5 and 1 days respectively with improved ability to participate with independent activities of daily living  Great benefit from subsequent radiofrequency ablation  I have personally reviewed and/or updated the patient's past medical history, past surgical history, family history, social history, current medications, allergies, and vital signs today  Review of Systems   Constitutional: Positive for activity change  HENT: Negative  Eyes: Negative  Respiratory: Negative  Cardiovascular: Negative  Gastrointestinal: Negative  Endocrine: Negative  Genitourinary: Negative  Musculoskeletal: Positive for arthralgias, myalgias, neck pain and neck stiffness  Skin: Negative  Allergic/Immunologic: Negative  Neurological: Negative for weakness and numbness  Hematological: Negative  Psychiatric/Behavioral: Negative  All other systems reviewed and are negative  Patient Active Problem List   Diagnosis    Cervical spondylosis    Depression    Hypertension    Fibromyalgia, primary    Diabetes [facility]    Osteoarthritis    GERD (gastroesophageal reflux disease)    Chronic pain disorder    Bipolar 1 disorder (HCC)    Cervical radiculopathy    Stage 3a chronic kidney disease ([facility] )       Past Medical History:   Diagnosis Date    Ambulates with cane     Pt states will use intermittently when fibromyalgia is really bad      Chronic kidney disease     Pt donated one of her kidneys to her spouse  Only has one      Chronic pain disorder     Depression     Diabetes mellitus ([address] )     Takes amaryl daily before breakfast    Fibromyalgia, primary     GERD (gastroesophageal reflux disease)     Hypertension     Osteoarthritis        Past Surgical History:   Procedure Laterality Date    CATARACT EXTRACTION Bilateral      SECTION      2 emergency procedures    EPIDURAL BLOCK INJECTION N/A 6/15/2021    Procedure: C7 T1 CERVICAL EPIDURAL STEROID INJECTION;  Surgeon: Cristy Hammonds MD;  [facility]: [facility];  Service: Pain Management     EPIDURAL BLOCK INJECTION N/A 2022    Procedure: BLOCK / INJECTION EPIDURAL STEROID CERVICAL C7-T1;  Surgeon: Cristy Hammonds MD;  [facility];  Service: Pain Management     FL GUIDED NEEDLE PLAC BX/ASP/INJ  9/15/2022    HYSTERECTOMY      Pt only has fallopian tube on the right    KIDNEY SURGERY Right     "gave a kidney"    NERVE BLOCK Right 2020    Procedure: C3 C4 C5 C6 MEDIAL BRANCH BLOCK #1;  Surgeon: Cristy Hammonds MD;  [facility];  Service: Pain Management     NERVE BLOCK Right 3/23/2021    Procedure: C3 C4 C5 C6 MEDIAL BRANCH BLOCK #2;  Surgeon: Cristy Hammonds MD;  [facility];  Service: Pain Management     NERVE BLOCK Left 9/15/2022    Procedure: BLOCK MEDIAL BRANCH C4, C5, C6 #1;  Surgeon: Cristy Hammonds MD;  [facility];  Service: Pain Management     RADIOFREQUENCY ABLATION Right 2021    Procedure: C3 C4 C5 C6 RADIO FREQUENCY ABLATION right side;  Surgeon: Cristy Hammonds MD;  [facility];  Service: Pain Management     TONSILECTOMY AND ADNOIDECTOMY      TONSILLECTOMY      WISDOM TOOTH EXTRACTION      WRIST SURGERY Right     Carpal tunnel       Family History   Problem Relation Age of Onset    Hypertension Mother     No Known Problems Father        Social History     Occupational History    Not on file   Tobacco Use    Smoking status: Light Tobacco Smoker     Years: 35 00     Types: Cigarettes    Smokeless tobacco: Never Used    Tobacco comment: "only smoke when I drink"   Vaping Use    Vaping Use: Never used   Substance and Sexual Activity    Alcohol use: Yes     Alcohol/week: 2 0 - 4 0 standard drinks     Types: 2 - 4 Glasses of wine per week     Comment: "who counts"    Drug use: Yes     Frequency: 1 0 times per week     Types: Marijuana     Comment: "For the pain"    Sexual activity: Not on file     Comment: did not ask       Current Outpatient Medications on File Prior to Visit   Medication Sig    allopurinol (ZYLOPRIM) 100 mg tablet     amLODIPine (NORVASC) 10 mg tablet Take 10 mg by mouth daily    ASPIRIN 81 PO Take 81 mg by mouth daily    Azelastine HCl 0 15 % SOLN     cycloSPORINE (RESTASIS) 0 05 % ophthalmic emulsion Administer 1 drop to both eyes 2 (two) times a day    DULoxetine (CYMBALTA) 60 mg delayed release capsule Take 100 mg by mouth daily     famotidine (PEPCID) 20 mg tablet Take 20 mg by mouth 2 (two) times a day    fenofibrate (TRICOR) 48 mg tablet     fluticasone (FLONASE) 50 mcg/act nasal spray 2 sprays into each nostril as needed     glimepiride (AMARYL) 1 mg tablet Take 1 mg by mouth every morning before breakfast    Jardiance 10 MG TABS tablet     metoprolol tartrate (LOPRESSOR) 50 mg tablet Take 50 mg by mouth every 12 (twelve) hours    polyethylene glycol (GLYCOLAX) 17 GM/SCOOP powder Take 17 g by mouth daily    QUEtiapine (SEROquel) 100 mg tablet Take 100 mg by mouth daily at bedtime    rosuvastatin (CRESTOR) 5 mg tablet Take 5 mg by mouth daily    Rybelsus 7 MG TABS     valsartan (DIOVAN) 320 MG tablet     Vascepa 1 g CAPS     [DISCONTINUED] colchicine (COLCRYS) 0 6 mg tablet     [DISCONTINUED] methylPREDNISolone 4 MG tablet therapy pack      No current facility-administered medications on file prior to visit         Allergies   Allergen Reactions    Lisinopril-Hydrochlorothiazide Throat Swelling    Penicillins Hives    Nsaids Other (See Comments)     Pt only has one kidney- should not take NSAIDS  Physical Exam    /78   Pulse 71   Temp 97 8 °F (36 6 °C)   Resp 20   Ht 5' 8" (1 727 m)   BMI 28 89 kg/m²     Constitutional: normal, well developed, well nourished, alert, in no distress and non-toxic and no overt pain behavior  and obese  Eyes: anicteric  HEENT: grossly intact  Neck: supple, symmetric, trachea midline and no masses   Pulmonary:even and unlabored  Cardiovascular:No edema or pitting edema present  Skin:Normal without rashes or lesions and well hydrated  Psychiatric:Mood and affect appropriate  Neurologic:Cranial Nerves II-XII grossly intact Sensation grossly intact; clonus with + clayton's  Reflexes 3+ and brisk in upper extremeties  SLR negative bilaterally  Spurling's maneuver not performed  Musculoskeletal:normal gait  Normal heel toe and tip toe walking  Significant pain with cervical facet loading bilaterally and with lateral spine rotation left greater than right  Decreased range of motion with left-sided lateral rotation of neck  TTP over left-sided cervical paraspinal muscles  Negative nas's test, negative gaenslen's negative SIJ loading bilaterally  Imaging    MRI CERVICAL SPINE WITHOUT CONTRAST     INDICATION: M54 2: Cervicalgia  Neck pain radiating into the right shoulder and down the arm      COMPARISON:  None      TECHNIQUE:  Sagittal T1, sagittal T2, sagittal inversion recovery, axial T2, axial  2D merge     IMAGE QUALITY:  Diagnostic     FINDINGS:     ALIGNMENT:  Normal alignment of the cervical spine  No compression fracture  No subluxation  No scoliosis      MARROW SIGNAL:  Scattered degenerative endplate changes  No focally suspicious marrow lesions    No bone marrow edema or compression abnormality      CERVICAL AND VISUALIZED THORACIC CORD:  Subtle foci of cord signal at the superior endplate of C6 as well as the superior endplate of C7 noted without cord expansion likely small foci of myelomalacia      PREVERTEBRAL AND PARASPINAL SOFT TISSUES:  Normal      VISUALIZED POSTERIOR FOSSA:  The visualized posterior fossa demonstrates no abnormal signal      CERVICAL DISC SPACES:     C2-C3:  Normal      C3-C4:  There is a disc osteophyte complex with a superimposed left neural foraminal disc protrusion  Moderate left neural foraminal narrowing  Mild to moderate central canal narrowing  Mild right neural foraminal narrowing      C4-C5:  Small right paracentral distribution  Mild central canal narrowing  Mild right neural foraminal narrowing  Left neural foramen patent      C5-C6:  There is loss of disc height and signal   There is a disc osteophyte complex with a superimposed left neural foraminal disc protrusion  Mild to moderate central canal narrowing  Moderate to severe left neural foraminal narrowing  Mild right   neural foraminal narrowing      C6-C7:  There is a disc osteophyte complex with a superimposed right neural foraminal disc protrusion  Moderate to severe central canal narrowing  Severe right neural foraminal narrowing  Moderate left neural foraminal narrowing      C7-T1:  Right neural foraminal disc protrusion  Moderate right neural foraminal narrowing  Central canal minimally narrowed  Mild left neural foraminal narrowing      UPPER THORACIC DISC SPACES:  Normal      IMPRESSION:     1   Multilevel spondylosis most pronounced at C5-C6 and C6-C7   2   Subtle foci of cord signal abnormality in the lower cervical cord at the superior endplates of C6 and C7 without cord expansion, likely small foci of myelomalacia        Cervical spondylosis and degenerative disc disease seen on x-ray of of cervical spine

## 2022-09-26 NOTE — H&P (VIEW-ONLY)
Assessment  1  Cervical spondylosis    Greater than 80% relief of pain with improved ability to participate with IADLs after left C4, C5, C6 medial branch blocks #1 for a few days  Previously reported the following symptomatology:     Chronic left-sided neck pain described primarily by arthritic features  Degenerative disc disease and spondylosis contributory  Imaging showing prominent spondylosis C4, C5, C6, C7 levels  Facet loading maneuvers positive  Denies any headaches  Reasonable to proceed with conservative multimodal pain therapy  NSAIDs relatively contraindicated secondary to renal dysfunction  Right-sided C3-C6 medial branch blocks #1 and #2 provided greater than 80% relief for a period of 5  And 1 days respectively in the past with improved ability to participate with independent activities of daily living  Great success with subsequent radiofrequency ablation  Risks, benefits and alternatives to mbb, RFA for left neck thoroughly discussed, handouts provided and questions answered to patient satisfaction  Of note there is significant C6-C7 stenosis for which she has been referred to [address] [place] [address] for  Denies gait instability, saddle anesthesia, bowel/bladder abnormality    Plan  -left C4, C5, C6 medial branch blocks #2; follow-up 2 weeks post procedure  -Flexeril 5 mg t i d  p r n  Pain prescribed  Has since discontinued  Counseled against operating heavy machinery or driving while under the influence of this medication  States that she will use just at night   - Counseled patient with regard to red flag signs, she will contact our office or seek emergency care should sudden weakness, bowel or bladder abnormality occur  There are risks associated with opioid medications, including dependence, addiction and tolerance  The patient understands and agrees to use these medications only as prescribed   Potential side effects of the medications include, but are not limited to, constipation, drowsiness, addiction, impaired judgment and risk of fatal overdose if not taken as prescribed  The patient was warned against driving while taking sedation medications  Sharing medications is a felony  At this point in time, the patient is showing no signs of addiction, abuse, diversion or suicidal ideation  [place] Prescription Drug Monitoring Program report was reviewed and was appropriate     Complete risks and benefits including bleeding, infection, tissue reaction, nerve injury and allergic reaction were discussed  The approach was demonstrated using models and literature was provided  Verbal and written consent was obtained  My impressions and treatment recommendations were discussed in detail with patient who verbalized understanding and had no further questions  Discharge instructions were provided  I personally saw and examined the patient and I agree with the above discussed plan of care  New Medications Ordered This Visit   Medications    allopurinol (ZYLOPRIM) 100 mg tablet    Jardiance 10 MG TABS tablet    fenofibrate (TRICOR) 48 mg tablet       History of Present Illness    Greater than 80% relief of pain with improved ability to participate with IADLs after left C4, C5, C6 medial branch blocks #1 for a few days  Previously reported the following symptomatology:     Kiana Alex is a 62 y o  female presenting with left-sided neck pain described primarily arthritic features  She presents with progressive neck pain described that is arthritic in nature for the past few years  The patient describes predominantly aching, nagging, indolent crampy, stabbing axial neck pain with very occasional radicular features of electric shock-like and shooting pain in the right upper extremity following the C6 dermatomal distribution occasionally accompanied by weakness numbness and paresthesias  The pain is 8/10 nature and is worse with overhead maneuvers as well as lateral rotation and extension of the neck  The patient has been to physical therapy, and has failed conservative medical management including Cymbalta 60 mg per day  The pain is significant source of disability and compromises independent activities of daily living as well as overall function  The patient has difficulty with sleep disturbance as well since the pain often wakes her up  The patient has been to physical therapy with limited benefit;  Right-sided C3-C6 medial branch blocks #1 and #2 provided greater than 80% relief for a period of 5 and 1 days respectively with improved ability to participate with independent activities of daily living  Great benefit from subsequent radiofrequency ablation  I have personally reviewed and/or updated the patient's past medical history, past surgical history, family history, social history, current medications, allergies, and vital signs today  Review of Systems   Constitutional: Positive for activity change  HENT: Negative  Eyes: Negative  Respiratory: Negative  Cardiovascular: Negative  Gastrointestinal: Negative  Endocrine: Negative  Genitourinary: Negative  Musculoskeletal: Positive for arthralgias, myalgias, neck pain and neck stiffness  Skin: Negative  Allergic/Immunologic: Negative  Neurological: Negative for weakness and numbness  Hematological: Negative  Psychiatric/Behavioral: Negative  All other systems reviewed and are negative  Patient Active Problem List   Diagnosis    Cervical spondylosis    Depression    Hypertension    Fibromyalgia, primary    Diabetes mellitus ([address] )    Osteoarthritis    GERD (gastroesophageal reflux disease)    Chronic pain disorder    Bipolar 1 disorder (HCC)    Cervical radiculopathy    Stage 3a chronic kidney disease ([address] )       Past Medical History:   Diagnosis Date    Ambulates with cane     Pt states will use intermittently when fibromyalgia is really bad      Chronic kidney disease     Pt donated one of her kidneys to her spouse  Only has one      Chronic pain disorder     Depression     Diabetes mellitus ([address] )     Takes amaryl daily before breakfast    Fibromyalgia, primary     GERD (gastroesophageal reflux disease)     Hypertension     Osteoarthritis        Past Surgical History:   Procedure Laterality Date    CATARACT EXTRACTION Bilateral      SECTION      2 emergency procedures    EPIDURAL BLOCK INJECTION N/A 6/15/2021    Procedure: C7 T1 CERVICAL EPIDURAL STEROID INJECTION;  Surgeon: Rola Witt MD;  [facility];  Service: Pain Management     EPIDURAL BLOCK INJECTION N/A 2022    Procedure: BLOCK / INJECTION EPIDURAL STEROID CERVICAL C7-T1;  Surgeon: Rola Witt MD;  [facility];  Service: Pain Management     FL GUIDED NEEDLE PLAC BX/ASP/INJ  9/15/2022    HYSTERECTOMY      Pt only has fallopian tube on the right    KIDNEY SURGERY Right     "gave a kidney"    NERVE BLOCK Right 2020    Procedure: C3 C4 C5 C6 MEDIAL BRANCH BLOCK #1;  Surgeon: Rola Witt MD;  [facility];  Service: Pain Management     NERVE BLOCK Right 3/23/2021    Procedure: C3 C4 C5 C6 MEDIAL BRANCH BLOCK #2;  Surgeon: Rola Witt MD;  [facility];  Service: Pain Management     NERVE BLOCK Left 9/15/2022    Procedure: BLOCK MEDIAL BRANCH C4, C5, C6 #1;  Surgeon: Rola Witt MD;  [facility];  Service: Pain Management     RADIOFREQUENCY ABLATION Right 2021    Procedure: C3 C4 C5 C6 RADIO FREQUENCY ABLATION right side;  Surgeon: Rola Witt MD;  [facility];  Service: Pain Management     TONSILECTOMY AND ADNOIDECTOMY      TONSILLECTOMY      WISDOM TOOTH EXTRACTION      WRIST SURGERY Right     Carpal tunnel       Family History   Problem Relation Age of Onset    Hypertension Mother     No Known Problems Father        Social History     Occupational History    Not on file   Tobacco Use    Smoking status: Light Tobacco Smoker     Years: 35 00     Types: Cigarettes    Smokeless tobacco: Never Used    Tobacco comment: "only smoke when I drink"   Vaping Use    Vaping Use: Never used   Substance and Sexual Activity    Alcohol use: Yes     Alcohol/week: 2 0 - 4 0 standard drinks     Types: 2 - 4 Glasses of wine per week     Comment: "who counts"    Drug use: Yes     Frequency: 1 0 times per week     Types: Marijuana     Comment: "For the pain"    Sexual activity: Not on file     Comment: did not ask       Current Outpatient Medications on File Prior to Visit   Medication Sig    allopurinol (ZYLOPRIM) 100 mg tablet     amLODIPine (NORVASC) 10 mg tablet Take 10 mg by mouth daily    ASPIRIN 81 PO Take 81 mg by mouth daily    Azelastine HCl 0 15 % SOLN     cycloSPORINE (RESTASIS) 0 05 % ophthalmic emulsion Administer 1 drop to both eyes 2 (two) times a day    DULoxetine (CYMBALTA) 60 mg delayed release capsule Take 100 mg by mouth daily     famotidine (PEPCID) 20 mg tablet Take 20 mg by mouth 2 (two) times a day    fenofibrate (TRICOR) 48 mg tablet     fluticasone (FLONASE) 50 mcg/act nasal spray 2 sprays into each nostril as needed     glimepiride (AMARYL) 1 mg tablet Take 1 mg by mouth every morning before breakfast    Jardiance 10 MG TABS tablet     metoprolol tartrate (LOPRESSOR) 50 mg tablet Take 50 mg by mouth every 12 (twelve) hours    polyethylene glycol (GLYCOLAX) 17 GM/SCOOP powder Take 17 g by mouth daily    QUEtiapine (SEROquel) 100 mg tablet Take 100 mg by mouth daily at bedtime    rosuvastatin (CRESTOR) 5 mg tablet Take 5 mg by mouth daily    Rybelsus 7 MG TABS     valsartan (DIOVAN) 320 MG tablet     Vascepa 1 g CAPS     [DISCONTINUED] colchicine (COLCRYS) 0 6 mg tablet     [DISCONTINUED] methylPREDNISolone 4 MG tablet therapy pack      No current facility-administered medications on file prior to visit         Allergies   Allergen Reactions    Lisinopril-Hydrochlorothiazide Throat Swelling    Penicillins Hives    Nsaids Other (See Comments)     Pt only has one kidney- should not take NSAIDS  Physical Exam    /78   Pulse 71   Temp 97 8 °F (36 6 °C)   Resp 20   Ht 5' 8" (1 727 m)   BMI 28 89 kg/m²     Constitutional: normal, well developed, well nourished, alert, in no distress and non-toxic and no overt pain behavior  and obese  Eyes: anicteric  HEENT: grossly intact  Neck: supple, symmetric, trachea midline and no masses   Pulmonary:even and unlabored  Cardiovascular:No edema or pitting edema present  Skin:Normal without rashes or lesions and well hydrated  Psychiatric:Mood and affect appropriate  Neurologic:Cranial Nerves II-XII grossly intact Sensation grossly intact; clonus with + clayton's  Reflexes 3+ and brisk in upper extremeties  SLR negative bilaterally  Spurling's maneuver not performed  Musculoskeletal:normal gait  Normal heel toe and tip toe walking  Significant pain with cervical facet loading bilaterally and with lateral spine rotation left greater than right  Decreased range of motion with left-sided lateral rotation of neck  TTP over left-sided cervical paraspinal muscles  Negative nas's test, negative gaenslen's negative SIJ loading bilaterally  Imaging    MRI CERVICAL SPINE WITHOUT CONTRAST     INDICATION: M54 2: Cervicalgia  Neck pain radiating into the right shoulder and down the arm      COMPARISON:  None      TECHNIQUE:  Sagittal T1, sagittal T2, sagittal inversion recovery, axial T2, axial  2D merge     IMAGE QUALITY:  Diagnostic     FINDINGS:     ALIGNMENT:  Normal alignment of the cervical spine  No compression fracture  No subluxation  No scoliosis      MARROW SIGNAL:  Scattered degenerative endplate changes  No focally suspicious marrow lesions    No bone marrow edema or compression abnormality      CERVICAL AND VISUALIZED THORACIC CORD:  Subtle foci of cord signal at the superior endplate of C6 as well as the superior endplate of C7 noted without cord expansion likely small foci of myelomalacia      PREVERTEBRAL AND PARASPINAL SOFT TISSUES:  Normal      VISUALIZED POSTERIOR FOSSA:  The visualized posterior fossa demonstrates no abnormal signal      CERVICAL DISC SPACES:     C2-C3:  Normal      C3-C4:  There is a disc osteophyte complex with a superimposed left neural foraminal disc protrusion  Moderate left neural foraminal narrowing  Mild to moderate central canal narrowing  Mild right neural foraminal narrowing      C4-C5:  Small right paracentral distribution  Mild central canal narrowing  Mild right neural foraminal narrowing  Left neural foramen patent      C5-C6:  There is loss of disc height and signal   There is a disc osteophyte complex with a superimposed left neural foraminal disc protrusion  Mild to moderate central canal narrowing  Moderate to severe left neural foraminal narrowing  Mild right   neural foraminal narrowing      C6-C7:  There is a disc osteophyte complex with a superimposed right neural foraminal disc protrusion  Moderate to severe central canal narrowing  Severe right neural foraminal narrowing  Moderate left neural foraminal narrowing      C7-T1:  Right neural foraminal disc protrusion  Moderate right neural foraminal narrowing  Central canal minimally narrowed  Mild left neural foraminal narrowing      UPPER THORACIC DISC SPACES:  Normal      IMPRESSION:     1   Multilevel spondylosis most pronounced at C5-C6 and C6-C7   2   Subtle foci of cord signal abnormality in the lower cervical cord at the superior endplates of C6 and C7 without cord expansion, likely small foci of myelomalacia        Cervical spondylosis and degenerative disc disease seen on x-ray of of cervical spine

## 2022-09-29 ENCOUNTER — APPOINTMENT (OUTPATIENT)
Dept: RADIOLOGY | Facility: HOSPITAL | Age: 62
End: 2022-09-29
Payer: MEDICARE

## 2022-09-29 ENCOUNTER — HOSPITAL ENCOUNTER (OUTPATIENT)
Facility: HOSPITAL | Age: 62
Setting detail: OUTPATIENT SURGERY
Discharge: HOME | End: 2022-09-29
Attending: ANESTHESIOLOGY | Admitting: ANESTHESIOLOGY
Payer: MEDICARE

## 2022-09-29 VITALS
HEART RATE: 73 BPM | TEMPERATURE: 97.8 F | BODY MASS INDEX: 28.79 KG/M2 | RESPIRATION RATE: 18 BRPM | WEIGHT: 190 LBS | OXYGEN SATURATION: 98 % | DIASTOLIC BLOOD PRESSURE: 72 MMHG | HEIGHT: 68 IN | SYSTOLIC BLOOD PRESSURE: 135 MMHG

## 2022-09-29 LAB — GLUCOSE SERPL-MCNC: 178 MG/DL (ref 65–140)

## 2022-09-29 PROCEDURE — 64491 INJ PARAVERT F JNT C/T 2 LEV: CPT | Performed by: ANESTHESIOLOGY

## 2022-09-29 PROCEDURE — 82948 REAGENT STRIP/BLOOD GLUCOSE: CPT

## 2022-09-29 PROCEDURE — 77002 NEEDLE LOCALIZATION BY XRAY: CPT

## 2022-09-29 PROCEDURE — 64490 INJ PARAVERT F JNT C/T 1 LEV: CPT | Performed by: ANESTHESIOLOGY

## 2022-09-29 RX ORDER — DEXAMETHASONE SODIUM PHOSPHATE 10 MG/ML
INJECTION, SOLUTION INTRAMUSCULAR; INTRAVENOUS AS NEEDED
Status: DISCONTINUED | OUTPATIENT
Start: 2022-09-29 | End: 2022-09-29 | Stop reason: HOSPADM

## 2022-09-29 RX ORDER — ALPRAZOLAM 0.5 MG/1
0.5 TABLET ORAL ONCE
Status: COMPLETED | OUTPATIENT
Start: 2022-09-29 | End: 2022-09-29

## 2022-09-29 RX ORDER — LIDOCAINE HYDROCHLORIDE 10 MG/ML
INJECTION, SOLUTION EPIDURAL; INFILTRATION; INTRACAUDAL; PERINEURAL AS NEEDED
Status: DISCONTINUED | OUTPATIENT
Start: 2022-09-29 | End: 2022-09-29 | Stop reason: HOSPADM

## 2022-09-29 RX ORDER — BUPIVACAINE HYDROCHLORIDE 2.5 MG/ML
INJECTION, SOLUTION EPIDURAL; INFILTRATION; INTRACAUDAL AS NEEDED
Status: DISCONTINUED | OUTPATIENT
Start: 2022-09-29 | End: 2022-09-29 | Stop reason: HOSPADM

## 2022-09-29 RX ADMIN — ALPRAZOLAM 0.5 MG: 0.5 TABLET ORAL at 12:30

## 2022-09-29 NOTE — DISCHARGE INSTRUCTIONS
YOUR 2 WEEK FOLLOW UP HAS BEEN SCHEDULED; IF YOU WISH TO CHANGE THE FOLLOW UP, PLEASE CALL THE [facility]: [phone]      MEDIAL BRANCH BLOCK DISCHARGE INSTRUCTIONS      ACTIVITY  Please do activities that will bring the normal pain that we are rating  For example, if vacuuming or walking increases the painm do that  Jorge twill give the most accurate response to the diary  You may shower, but no tub baths today, or applied heat  CARE OF THE INJECTION SITE  This area may be numb for several hours after the injection  Notify the [facility] if you have any of the following: redness, drainage, swelling or fever above 100°F     SPECIAL INSTRUCTIONS  Please return the MBB diary to our office by mail, fax, or drop it off  MEDICATIONS  Please do not take any break through or short acting pain medications for 8 hours after the block  Continue to take all routine medications  Our office may have instructed you to hold some medications  You may resume _______________________________________________  If you have any problems specifically related to your procedure, please call our office at [phone]  Problems not related to your procedure should be directed at your primary care physician  Cervical Radiofrequency Ablation   WHAT YOU NEED TO KNOW:   What do I need to know about  cervicalradiofrequency ablation? cervicalradiofrequency ablation (RFA) is a procedure used to treat facet joint pain in your  neck  Facet joints are found at the back of each vertebra  A needle electrode is used to send electrical currents to the nerves in your facet joint  The electrical currents create heat that damages the nerve so it cannot send pain signals  How do I prepare for cervical RFA? Your healthcare provider will talk to you about how to prepare for this procedure  He may tell you not to eat or drink anything after midnight on the day of your procedure   He will tell you what medicines to take or not take on the day of your procedure  What will happen during cervical RFA? You will lie on your stomach  You will be given local anesthesia to numb the area of your  Neck where the needle electrode will be inserted  You may be given a sedative to help keep you relaxed  You may still feel pressure or pushing during the procedure, but you should not feel any pain  Your healthcare provider will use fluoroscopy (a type of x-ray) to guide the needle electrode to the nerves near your facet joint  Your healthcare provider may touch the affected nerve to make sure the needle electrode is in the right place  You will feel tingling or pressure when he does this  He will then apply local anesthesia to the nerve to numb it  This will prevent you from feeling pain when he applies heat to the nerve  Your healthcare provider will then apply heat to the nerve using the needle electrode  He may need to apply heat to more than one nerve  He will remove the needle electrode and apply a bandage over the area  What are the risks of  cervical RFA? You may have pain, numbness, tingling, or burning in the area where the lumbar RFA was done  These normally go away within 6 weeks  The needle electrode may injure your spinal nerves  This may cause permanent arm weakness or nerve pain  CARE AGREEMENT:   You have the right to help plan your care  Learn about your health condition and how it may be treated  Discuss treatment options with your healthcare providers to decide what care you want to receive  You always have the right to refuse treatment  The above information is an  only  It is not intended as medical advice for individual conditions or treatments  Talk to your doctor, nurse or pharmacist before following any medical regimen to see if it is safe and effective for you    © Copyright [address] Information is for End User's use only and may not be sold, redistributed or otherwise used for commercial purposes   All illustrations and images included in CareNotes® are the copyrighted property of A D A M , Inc  or [facility] Guido Becker

## 2022-09-29 NOTE — OP NOTE
OPERATIVE REPORT  PATIENT NAME: Carmita Almeida    :  1960  MRN: [id number]  Pt Location:  GI ROOM 01    SURGERY DATE: 2022    Surgeon(s) and Role: Katie Keys MD - Primary    Preop Diagnosis:  Cervical spondylosis [M47 812]    Post-Op Diagnosis Codes:     * Cervical spondylosis [M47 812]    Procedure(s) (LRB):  BLOCK MEDIAL BRANCH C4, C5, C6 (Left)    Specimen(s):  * No specimens in log *    Estimated Blood Loss:   Minimal    Drains:  * No LDAs found *    Anesthesia Type:   Local    Operative Indications:  Cervical spondylosis [M47 812]    Operative Findings:  Left C4, C5, C6 medial branch nerve regions identified under fluoroscopic guidance      Complications:   None    Procedure and Technique:  Please see detailed procedure note     I was present for the entire procedure    Patient Disposition:  PACU     SIGNATURE: Efraín Mojica MD  DATE: 2022  TIME: 1:05 PM

## 2022-09-29 NOTE — INTERVAL H&P NOTE
H&P reviewed  After examining the patient I find no changes in the patients condition since the H&P had been written      Vitals:    09/29/22 1222   BP: 130/76   Pulse: 78   Resp: 18   Temp: 98 4 °F (36 9 °C)   SpO2: 98%

## 2022-09-29 NOTE — PROCEDURES
Pre-procedure Diagnosis: Cervical Facet Arthropathy  Post-procedure Diagnosis: Cervical Facet Arthropathy  Procedure Title(s):  [LEFT C4, C5, C6] medial branch nerve blocks [(CONFIRMATORY)]  Attending Surgeon:   Louise Hahn MD  Anesthesia:   Local     Indications: The patient is a 62y o  year-old female with a diagnosis of cervical facet arthropathy  The patient's history and physical exam were reviewed  The risks, benefits and alternatives to the procedure were discussed, and all questions were answered to the patient's satisfaction  The patient agreed to proceed, and written informed consent was obtained  Procedure in Detail: The patient was brought into the procedure room and placed in the prone position on the fluoroscopy table  The neck was prepped with chloraprep solution times one and draped in a sterile manner  AP fluoroscopic views were used to identify and sarah the centroid of the mid-articular pillars of the [C4, C5, C6] levels on the [LEFT] side  The skin and subcutaneous tissues in these areas were anesthetized with 1% lidocaine  22-gauge 3 5 inch spinal needles were directed toward the targeted points until bone was contacted  After negative aspiration was confirmed, 0 5ml of a mixture of (3mL 0 25% bupivicaine and 1mL dexamethasone (10mg/mL))was injected at each level  The needles were removed, and the patient's neck was cleaned  Bandages were placed over the points of needle insertion  Disposition: The patient tolerated the procedure well, and there were no apparent complications  The patient was taken to the recovery area where written discharge instructions for the procedure were given  The patient was given a pain diary to determine if the patient's pain improves following the injection  Once the diary is returned we will consider next appropriate course of treatment  Postoperative pain relief [WAS] significant      Estimated Blood Loss: None  Specimens Obtained: N/A

## 2022-10-21 ENCOUNTER — OFFICE VISIT (OUTPATIENT)
Dept: PAIN MEDICINE | Facility: CLINIC | Age: 62
End: 2022-10-21
Payer: MEDICARE

## 2022-10-21 VITALS
HEART RATE: 72 BPM | SYSTOLIC BLOOD PRESSURE: 116 MMHG | HEIGHT: 68 IN | BODY MASS INDEX: 28.89 KG/M2 | TEMPERATURE: 97.3 F | RESPIRATION RATE: 20 BRPM | DIASTOLIC BLOOD PRESSURE: 72 MMHG

## 2022-10-21 DIAGNOSIS — M47.812 CERVICAL SPONDYLOSIS: Primary | ICD-10-CM

## 2022-10-21 DIAGNOSIS — M79.18 MYOFASCIAL MUSCLE PAIN: ICD-10-CM

## 2022-10-21 PROCEDURE — 99214 OFFICE O/P EST MOD 30 MIN: CPT | Performed by: ANESTHESIOLOGY

## 2022-10-21 RX ORDER — METHYLPREDNISOLONE ACETATE 80 MG/ML
80 INJECTION, SUSPENSION INTRA-ARTICULAR; INTRALESIONAL; INTRAMUSCULAR; PARENTERAL; SOFT TISSUE ONCE
OUTPATIENT
Start: 2022-10-21 | End: 2022-10-21

## 2022-10-21 RX ORDER — LIDOCAINE HYDROCHLORIDE 10 MG/ML
10 INJECTION, SOLUTION EPIDURAL; INFILTRATION; INTRACAUDAL; PERINEURAL ONCE
OUTPATIENT
Start: 2022-10-21 | End: 2022-10-21

## 2022-10-21 RX ORDER — BUPIVACAINE HCL/PF 2.5 MG/ML
5 VIAL (ML) INJECTION ONCE
OUTPATIENT
Start: 2022-10-21 | End: 2022-10-21

## 2022-10-21 RX ORDER — CYCLOBENZAPRINE HCL 5 MG
5 TABLET ORAL 3 TIMES DAILY PRN
Qty: 90 TABLET | Refills: 0 | Status: SHIPPED | OUTPATIENT
Start: 2022-10-21

## 2022-10-21 NOTE — PROGRESS NOTES
Assessment  1  Cervical spondylosis  -     Case request operating room: RHIZOTOMY C4, C5, C6 medial branch nerves; Standing  -     Case request operating room: RHIZOTOMY C4, C5, C6 medial branch nerves    Greater than 80% relief of pain with improved ability to participate with IADLs after left C4, C5, C6 medial branch blocks #1 and #2 for a few days  Previously reported the following symptomatology:     Chronic left-sided neck pain described primarily by arthritic features  Degenerative disc disease and spondylosis contributory  Imaging showing prominent spondylosis C4, C5, C6, C7 levels  Facet loading maneuvers positive  Denies any headaches  Reasonable to proceed with conservative multimodal pain therapy  NSAIDs relatively contraindicated secondary to renal dysfunction  Right-sided C3-C6 medial branch blocks #1 and #2 provided greater than 80% relief for a period of 5  And 1 days respectively in the past with improved ability to participate with independent activities of daily living  Great success with subsequent radiofrequency ablation  Risks, benefits and alternatives to mbb, RFA for left neck thoroughly discussed, handouts provided and questions answered to patient satisfaction  Of note there is significant C6-C7 stenosis for which she has been referred to [address] [place] [address] for  Denies gait instability, saddle anesthesia, bowel/bladder abnormality    Plan  -left C4, C5, C6 medial branch nerve radiofrequency ablation; follow-up 3 weeks post procedure  -Flexeril 5 mg t i d  p r n  Pain prescribed  Has since discontinued  Counseled against operating heavy machinery or driving while under the influence of this medication  States that she will use just at night   - Counseled patient with regard to red flag signs, she will contact our office or seek emergency care should sudden weakness, bowel or bladder abnormality occur      There are risks associated with opioid medications, including dependence, addiction and tolerance  The patient understands and agrees to use these medications only as prescribed  Potential side effects of the medications include, but are not limited to, constipation, drowsiness, addiction, impaired judgment and risk of fatal overdose if not taken as prescribed  The patient was warned against driving while taking sedation medications  Sharing medications is a felony  At this point in time, the patient is showing no signs of addiction, abuse, diversion or suicidal ideation  [place] Prescription Drug Monitoring Program report was reviewed and was appropriate     Complete risks and benefits including bleeding, infection, tissue reaction, nerve injury and allergic reaction were discussed  The approach was demonstrated using models and literature was provided  Verbal and written consent was obtained  My impressions and treatment recommendations were discussed in detail with patient who verbalized understanding and had no further questions  Discharge instructions were provided  I personally saw and examined the patient and I agree with the above discussed plan of care  No orders of the defined types were placed in this encounter  History of Present Illness    Greater than 80% relief of pain with improved ability to participate with IADLs after left C4, C5, C6 medial branch blocks #1 and #2 for a few days  Previously reported the following symptomatology:     Vincent Aceves is a 62 y o  female presenting with left-sided neck pain described primarily arthritic features  She presents with progressive neck pain described that is arthritic in nature for the past few years  The patient describes predominantly aching, nagging, indolent crampy, stabbing axial neck pain with very occasional radicular features of electric shock-like and shooting pain in the right upper extremity following the C6 dermatomal distribution occasionally accompanied by weakness numbness and paresthesias    The pain is 8/10 nature and is worse with overhead maneuvers as well as lateral rotation and extension of the neck  The patient has been to physical therapy, and has failed conservative medical management including Cymbalta 60 mg per day  The pain is significant source of disability and compromises independent activities of daily living as well as overall function  The patient has difficulty with sleep disturbance as well since the pain often wakes her up  The patient has been to physical therapy with limited benefit;  Right-sided C3-C6 medial branch blocks #1 and #2 provided greater than 80% relief for a period of 5 and 1 days respectively with improved ability to participate with independent activities of daily living  Great benefit from subsequent radiofrequency ablation  I have personally reviewed and/or updated the patient's past medical history, past surgical history, family history, social history, current medications, allergies, and vital signs today  Review of Systems   Constitutional: Positive for activity change  HENT: Negative  Eyes: Negative  Respiratory: Negative  Cardiovascular: Negative  Gastrointestinal: Negative  Endocrine: Negative  Genitourinary: Negative  Musculoskeletal: Positive for arthralgias, myalgias, neck pain and neck stiffness  Skin: Negative  Allergic/Immunologic: Negative  Neurological: Negative for weakness and numbness  Hematological: Negative  Psychiatric/Behavioral: Negative  All other systems reviewed and are negative        Patient Active Problem List   Diagnosis   • Cervical spondylosis   • Depression   • Hypertension   • Fibromyalgia, primary   • Diabetes mellitus ([facility] )   • Osteoarthritis   • GERD (gastroesophageal reflux disease)   • Chronic pain disorder   • Bipolar 1 disorder ([facility] )   • Cervical radiculopathy   • Stage 3a chronic kidney disease ([facility] )       Past Medical History:   Diagnosis Date   • Ambulates with cane     Pt states will use intermittently when fibromyalgia is really bad  • Chronic kidney disease     Pt donated one of her kidneys to her spouse  Only has one     • Chronic pain disorder    • Depression    • Diabetes mellitus ([address] )     Takes amaryl daily before breakfast   • Fibromyalgia, primary    • GERD (gastroesophageal reflux disease)    • Hypertension    • Osteoarthritis        Past Surgical History:   Procedure Laterality Date   • CATARACT EXTRACTION Bilateral    •  SECTION      2 emergency procedures   • EPIDURAL BLOCK INJECTION N/A 6/15/2021    Procedure: C7 T1 CERVICAL EPIDURAL STEROID INJECTION;  Surgeon: Yesi Salas MD;  Location: [facility];  Service: Pain Management    • EPIDURAL BLOCK INJECTION N/A 2022    Procedure: BLOCK / INJECTION EPIDURAL STEROID CERVICAL C7-T1;  Surgeon: Yesi Salas MD;  Location: [facility];  Service: Pain Management    • FL GUIDED NEEDLE PLAC BX/ASP/INJ  9/15/2022   • FL GUIDED NEEDLE PLAC BX/ASP/INJ  2022   • HYSTERECTOMY      Pt only has fallopian tube on the right   • KIDNEY SURGERY Right     "gave a kidney"   • NERVE BLOCK Right 2020    Procedure: C3 C4 C5 C6 MEDIAL BRANCH BLOCK #1;  Surgeon: Yesi Salas MD;  Location: [facility];  Service: Pain Management    • NERVE BLOCK Right 3/23/2021    Procedure: C3 C4 C5 C6 MEDIAL BRANCH BLOCK #2;  Surgeon: Yesi Salas MD;  Location: [facility];  Service: Pain Management    • NERVE BLOCK Left 9/15/2022    Procedure: BLOCK MEDIAL BRANCH C4, C5, C6 #1;  Surgeon: Yesi Salas MD;  Location: [facility];  Service: Pain Management    • NERVE BLOCK Left 2022    Procedure: BLOCK MEDIAL BRANCH C4, C5, C6;  Surgeon: Yesi Salas MD;  Location: [facility];  Service: Pain Management    • RADIOFREQUENCY ABLATION Right 2021    Procedure: C3 C4 C5 C6 RADIO FREQUENCY ABLATION right side;  Surgeon: Yesi Salas MD;  Location: [facility];  Service: Pain Management    • TONSILECTOMY AND ADNOIDECTOMY     • TONSILLECTOMY     • WISDOM TOOTH EXTRACTION     • WRIST SURGERY Right     Carpal tunnel       Family History   Problem Relation Age of Onset   • Hypertension Mother    • No Known Problems Father        Social History     Occupational History   • Not on file   Tobacco Use   • Smoking status: Light Tobacco Smoker     Years: 35 00     Types: Cigarettes   • Smokeless tobacco: Never Used   • Tobacco comment: "only smoke when I drink"   Vaping Use   • Vaping Use: Never used   Substance and Sexual Activity   • Alcohol use:  Yes     Alcohol/week: 2 0 - 4 0 standard drinks     Types: 2 - 4 Glasses of wine per week     Comment: "who counts"   • Drug use: Yes     Frequency: 1 0 times per week     Types: Marijuana     Comment: "For the pain"   • Sexual activity: Not on file     Comment: did not ask       Current Outpatient Medications on File Prior to Visit   Medication Sig   • allopurinol (ZYLOPRIM) 100 mg tablet    • amLODIPine (NORVASC) 10 mg tablet Take 10 mg by mouth daily   • ASPIRIN 81 PO Take 81 mg by mouth daily   • Azelastine HCl 0 15 % SOLN    • cycloSPORINE (RESTASIS) 0 05 % ophthalmic emulsion Administer 1 drop to both eyes 2 (two) times a day   • DULoxetine (CYMBALTA) 60 mg delayed release capsule Take 100 mg by mouth daily    • famotidine (PEPCID) 20 mg tablet Take 20 mg by mouth 2 (two) times a day   • fenofibrate (TRICOR) 48 mg tablet    • fluticasone (FLONASE) 50 mcg/act nasal spray 2 sprays into each nostril as needed    • glimepiride (AMARYL) 1 mg tablet Take 1 mg by mouth every morning before breakfast   • Jardiance 10 MG TABS tablet    • metoprolol tartrate (LOPRESSOR) 50 mg tablet Take 50 mg by mouth every 12 (twelve) hours   • polyethylene glycol (GLYCOLAX) 17 GM/SCOOP powder Take 17 g by mouth daily   • QUEtiapine (SEROquel) 100 mg tablet Take 100 mg by mouth daily at bedtime   • rosuvastatin (CRESTOR) 5 mg tablet Take 5 mg by mouth daily   • Rybelsus 7 MG TABS    • valsartan (DIOVAN) 320 MG tablet    • Vascepa 1 g CAPS No current facility-administered medications on file prior to visit  Allergies   Allergen Reactions   • Lisinopril-Hydrochlorothiazide Throat Swelling   • Penicillins Hives   • Nsaids Other (See Comments)     Pt only has one kidney- should not take NSAIDS  Physical Exam    /72   Pulse 72   Temp (!) 97 3 °F (36 3 °C)   Resp 20   Ht 5' 8" (1 727 m)   BMI 28 89 kg/m²     Constitutional: normal, well developed, well nourished, alert, in no distress and non-toxic and no overt pain behavior  and obese  Eyes: anicteric  HEENT: grossly intact  Neck: supple, symmetric, trachea midline and no masses   Pulmonary:even and unlabored  Cardiovascular:No edema or pitting edema present  Skin:Normal without rashes or lesions and well hydrated  Psychiatric:Mood and affect appropriate  Neurologic:Cranial Nerves II-XII grossly intact Sensation grossly intact; clonus with + clayton's  Reflexes 3+ and brisk in upper extremeties  SLR negative bilaterally  Spurling's maneuver not performed  Musculoskeletal:normal gait  Normal heel toe and tip toe walking  Significant pain with cervical facet loading bilaterally and with lateral spine rotation left greater than right  Decreased range of motion with left-sided lateral rotation of neck  TTP over left-sided cervical paraspinal muscles  Negative nas's test, negative gaenslen's negative SIJ loading bilaterally  Imaging    MRI CERVICAL SPINE WITHOUT CONTRAST     INDICATION: M54 2: Cervicalgia  Neck pain radiating into the right shoulder and down the arm      COMPARISON:  None      TECHNIQUE:  Sagittal T1, sagittal T2, sagittal inversion recovery, axial T2, axial  2D merge     IMAGE QUALITY:  Diagnostic     FINDINGS:     ALIGNMENT:  Normal alignment of the cervical spine  No compression fracture  No subluxation  No scoliosis      MARROW SIGNAL:  Scattered degenerative endplate changes  No focally suspicious marrow lesions    No bone marrow edema or compression abnormality      CERVICAL AND VISUALIZED THORACIC CORD:  Subtle foci of cord signal at the superior endplate of C6 as well as the superior endplate of C7 noted without cord expansion likely small foci of myelomalacia      PREVERTEBRAL AND PARASPINAL SOFT TISSUES:  Normal      VISUALIZED POSTERIOR FOSSA:  The visualized posterior fossa demonstrates no abnormal signal      CERVICAL DISC SPACES:     C2-C3:  Normal      C3-C4:  There is a disc osteophyte complex with a superimposed left neural foraminal disc protrusion  Moderate left neural foraminal narrowing  Mild to moderate central canal narrowing  Mild right neural foraminal narrowing      C4-C5:  Small right paracentral distribution  Mild central canal narrowing  Mild right neural foraminal narrowing  Left neural foramen patent      C5-C6:  There is loss of disc height and signal   There is a disc osteophyte complex with a superimposed left neural foraminal disc protrusion  Mild to moderate central canal narrowing  Moderate to severe left neural foraminal narrowing  Mild right   neural foraminal narrowing      C6-C7:  There is a disc osteophyte complex with a superimposed right neural foraminal disc protrusion  Moderate to severe central canal narrowing  Severe right neural foraminal narrowing  Moderate left neural foraminal narrowing      C7-T1:  Right neural foraminal disc protrusion  Moderate right neural foraminal narrowing  Central canal minimally narrowed  Mild left neural foraminal narrowing      UPPER THORACIC DISC SPACES:  Normal      IMPRESSION:     1   Multilevel spondylosis most pronounced at C5-C6 and C6-C7   2   Subtle foci of cord signal abnormality in the lower cervical cord at the superior endplates of C6 and C7 without cord expansion, likely small foci of myelomalacia        Cervical spondylosis and degenerative disc disease seen on x-ray of of cervical spine

## 2022-10-21 NOTE — H&P (VIEW-ONLY)
Assessment  1  Cervical spondylosis  -     Case request operating room: RHIZOTOMY C4, C5, C6 medial branch nerves; Standing  -     Case request operating room: RHIZOTOMY C4, C5, C6 medial branch nerves    Greater than 80% relief of pain with improved ability to participate with IADLs after left C4, C5, C6 medial branch blocks #1 and #2 for a few days  Previously reported the following symptomatology:     Chronic left-sided neck pain described primarily by arthritic features  Degenerative disc disease and spondylosis contributory  Imaging showing prominent spondylosis C4, C5, C6, C7 levels  Facet loading maneuvers positive  Denies any headaches  Reasonable to proceed with conservative multimodal pain therapy  NSAIDs relatively contraindicated secondary to renal dysfunction  Right-sided C3-C6 medial branch blocks #1 and #2 provided greater than 80% relief for a period of 5  And 1 days respectively in the past with improved ability to participate with independent activities of daily living  Great success with subsequent radiofrequency ablation  Risks, benefits and alternatives to mbb, RFA for left neck thoroughly discussed, handouts provided and questions answered to patient satisfaction  Of note there is significant C6-C7 stenosis for which she has been referred to [address] [place] [address] for  Denies gait instability, saddle anesthesia, bowel/bladder abnormality    Plan  -left C4, C5, C6 medial branch nerve radiofrequency ablation; follow-up 3 weeks post procedure  -Flexeril 5 mg t i d  p r n  Pain prescribed  Has since discontinued  Counseled against operating heavy machinery or driving while under the influence of this medication  States that she will use just at night   - Counseled patient with regard to red flag signs, she will contact our office or seek emergency care should sudden weakness, bowel or bladder abnormality occur      There are risks associated with opioid medications, including dependence, addiction and tolerance  The patient understands and agrees to use these medications only as prescribed  Potential side effects of the medications include, but are not limited to, constipation, drowsiness, addiction, impaired judgment and risk of fatal overdose if not taken as prescribed  The patient was warned against driving while taking sedation medications  Sharing medications is a felony  At this point in time, the patient is showing no signs of addiction, abuse, diversion or suicidal ideation  [facility] Prescription Drug Monitoring Program report was reviewed and was appropriate     Complete risks and benefits including bleeding, infection, tissue reaction, nerve injury and allergic reaction were discussed  The approach was demonstrated using models and literature was provided  Verbal and written consent was obtained  My impressions and treatment recommendations were discussed in detail with patient who verbalized understanding and had no further questions  Discharge instructions were provided  I personally saw and examined the patient and I agree with the above discussed plan of care  No orders of the defined types were placed in this encounter  History of Present Illness    Greater than 80% relief of pain with improved ability to participate with IADLs after left C4, C5, C6 medial branch blocks #1 and #2 for a few days  Previously reported the following symptomatology:     Dalia Colindres is a 62 y o  female presenting with left-sided neck pain described primarily arthritic features  She presents with progressive neck pain described that is arthritic in nature for the past few years  The patient describes predominantly aching, nagging, indolent crampy, stabbing axial neck pain with very occasional radicular features of electric shock-like and shooting pain in the right upper extremity following the C6 dermatomal distribution occasionally accompanied by weakness numbness and paresthesias    The pain is 8/10 nature and is worse with overhead maneuvers as well as lateral rotation and extension of the neck  The patient has been to physical therapy, and has failed conservative medical management including Cymbalta 60 mg per day  The pain is significant source of disability and compromises independent activities of daily living as well as overall function  The patient has difficulty with sleep disturbance as well since the pain often wakes her up  The patient has been to physical therapy with limited benefit;  Right-sided C3-C6 medial branch blocks #1 and #2 provided greater than 80% relief for a period of 5 and 1 days respectively with improved ability to participate with independent activities of daily living  Great benefit from subsequent radiofrequency ablation  I have personally reviewed and/or updated the patient's past medical history, past surgical history, family history, social history, current medications, allergies, and vital signs today  Review of Systems   Constitutional: Positive for activity change  HENT: Negative  Eyes: Negative  Respiratory: Negative  Cardiovascular: Negative  Gastrointestinal: Negative  Endocrine: Negative  Genitourinary: Negative  Musculoskeletal: Positive for arthralgias, myalgias, neck pain and neck stiffness  Skin: Negative  Allergic/Immunologic: Negative  Neurological: Negative for weakness and numbness  Hematological: Negative  Psychiatric/Behavioral: Negative  All other systems reviewed and are negative        Patient Active Problem List   Diagnosis   • Cervical spondylosis   • Depression   • Hypertension   • Fibromyalgia, primary   • Diabetes mellitus ([facility] )   • Osteoarthritis   • GERD (gastroesophageal reflux disease)   • Chronic pain disorder   • Bipolar 1 disorder ([facility] )   • Cervical radiculopathy   • Stage 3a chronic kidney disease ([facility] )       Past Medical History:   Diagnosis Date   • Ambulates with cane     Pt states will use intermittently when fibromyalgia is really bad  • Chronic kidney disease     Pt donated one of her kidneys to her spouse  Only has one     • Chronic pain disorder    • Depression    • Diabetes mellitus ([address] )     Takes amaryl daily before breakfast   • Fibromyalgia, primary    • GERD (gastroesophageal reflux disease)    • Hypertension    • Osteoarthritis        Past Surgical History:   Procedure Laterality Date   • CATARACT EXTRACTION Bilateral    •  SECTION      2 emergency procedures   • EPIDURAL BLOCK INJECTION N/A 6/15/2021    Procedure: C7 T1 CERVICAL EPIDURAL STEROID INJECTION;  Surgeon: Sanjuana Lindsay MD;  [facility];  Service: Pain Management    • EPIDURAL BLOCK INJECTION N/A 2022    Procedure: BLOCK / INJECTION EPIDURAL STEROID CERVICAL C7-T1;  Surgeon: Sanjuana Lindsay MD;  [facility];  Service: Pain Management    • FL GUIDED NEEDLE PLAC BX/ASP/INJ  9/15/2022   • [place] GUIDED NEEDLE PLAC BX/ASP/INJ  2022   • HYSTERECTOMY      Pt only has fallopian tube on the right   • KIDNEY SURGERY Right     "gave a kidney"   • NERVE BLOCK Right 2020    Procedure: C3 C4 C5 C6 MEDIAL BRANCH BLOCK #1;  Surgeon: Sanjuana Lindsay MD;  [facility];  Service: Pain Management    • NERVE BLOCK Right 3/23/2021    Procedure: C3 C4 C5 C6 MEDIAL BRANCH BLOCK #2;  Surgeon: Sanjuana Lindsay MD;  [facility];  Service: Pain Management    • NERVE BLOCK Left 9/15/2022    Procedure: BLOCK MEDIAL BRANCH C4, C5, C6 #1;  Surgeon: Sanjuana Lindsay MD;  [facility];  Service: Pain Management    • NERVE BLOCK Left 2022    Procedure: BLOCK MEDIAL BRANCH C4, C5, C6;  Surgeon: Sanjuana Lindsay MD;  [facility];  Service: Pain Management    • RADIOFREQUENCY ABLATION Right 2021    Procedure: C3 C4 C5 C6 RADIO FREQUENCY ABLATION right side;  Surgeon: Sanjuana Lindsay MD;  [facility];  Service: Pain Management    • TONSILECTOMY AND ADNOIDECTOMY     • TONSILLECTOMY     • WISDOM TOOTH EXTRACTION     • WRIST SURGERY Right     Carpal tunnel       Family History   Problem Relation Age of Onset   • Hypertension Mother    • No Known Problems Father        Social History     Occupational History   • Not on file   Tobacco Use   • Smoking status: Light Tobacco Smoker     Years: 35 00     Types: Cigarettes   • Smokeless tobacco: Never Used   • Tobacco comment: "only smoke when I drink"   Vaping Use   • Vaping Use: Never used   Substance and Sexual Activity   • Alcohol use:  Yes     Alcohol/week: 2 0 - 4 0 standard drinks     Types: 2 - 4 Glasses of wine per week     Comment: "who counts"   • Drug use: Yes     Frequency: 1 0 times per week     Types: Marijuana     Comment: "For the pain"   • Sexual activity: Not on file     Comment: did not ask       Current Outpatient Medications on File Prior to Visit   Medication Sig   • allopurinol (ZYLOPRIM) 100 mg tablet    • amLODIPine (NORVASC) 10 mg tablet Take 10 mg by mouth daily   • ASPIRIN 81 PO Take 81 mg by mouth daily   • Azelastine HCl 0 15 % SOLN    • cycloSPORINE (RESTASIS) 0 05 % ophthalmic emulsion Administer 1 drop to both eyes 2 (two) times a day   • DULoxetine (CYMBALTA) 60 mg delayed release capsule Take 100 mg by mouth daily    • famotidine (PEPCID) 20 mg tablet Take 20 mg by mouth 2 (two) times a day   • fenofibrate (TRICOR) 48 mg tablet    • fluticasone (FLONASE) 50 mcg/act nasal spray 2 sprays into each nostril as needed    • glimepiride (AMARYL) 1 mg tablet Take 1 mg by mouth every morning before breakfast   • Jardiance 10 MG TABS tablet    • metoprolol tartrate (LOPRESSOR) 50 mg tablet Take 50 mg by mouth every 12 (twelve) hours   • polyethylene glycol (GLYCOLAX) 17 GM/SCOOP powder Take 17 g by mouth daily   • QUEtiapine (SEROquel) 100 mg tablet Take 100 mg by mouth daily at bedtime   • rosuvastatin (CRESTOR) 5 mg tablet Take 5 mg by mouth daily   • Rybelsus 7 MG TABS    • valsartan (DIOVAN) 320 MG tablet    • Vascepa 1 g CAPS No current facility-administered medications on file prior to visit  Allergies   Allergen Reactions   • Lisinopril-Hydrochlorothiazide Throat Swelling   • Penicillins Hives   • Nsaids Other (See Comments)     Pt only has one kidney- should not take NSAIDS  Physical Exam    /72   Pulse 72   Temp (!) 97 3 °F (36 3 °C)   Resp 20   Ht 5' 8" (1 727 m)   BMI 28 89 kg/m²     Constitutional: normal, well developed, well nourished, alert, in no distress and non-toxic and no overt pain behavior  and obese  Eyes: anicteric  HEENT: grossly intact  Neck: supple, symmetric, trachea midline and no masses   Pulmonary:even and unlabored  Cardiovascular:No edema or pitting edema present  Skin:Normal without rashes or lesions and well hydrated  Psychiatric:Mood and affect appropriate  Neurologic:Cranial Nerves II-XII grossly intact Sensation grossly intact; clonus with + clayton's  Reflexes 3+ and brisk in upper extremeties  SLR negative bilaterally  Spurling's maneuver not performed  Musculoskeletal:normal gait  Normal heel toe and tip toe walking  Significant pain with cervical facet loading bilaterally and with lateral spine rotation left greater than right  Decreased range of motion with left-sided lateral rotation of neck  TTP over left-sided cervical paraspinal muscles  Negative nas's test, negative gaenslen's negative SIJ loading bilaterally  Imaging    MRI CERVICAL SPINE WITHOUT CONTRAST     INDICATION: M54 2: Cervicalgia  Neck pain radiating into the right shoulder and down the arm      COMPARISON:  None      TECHNIQUE:  Sagittal T1, sagittal T2, sagittal inversion recovery, axial T2, axial  2D merge     IMAGE QUALITY:  Diagnostic     FINDINGS:     ALIGNMENT:  Normal alignment of the cervical spine  No compression fracture  No subluxation  No scoliosis      MARROW SIGNAL:  Scattered degenerative endplate changes  No focally suspicious marrow lesions    No bone marrow edema or compression abnormality      CERVICAL AND VISUALIZED THORACIC CORD:  Subtle foci of cord signal at the superior endplate of C6 as well as the superior endplate of C7 noted without cord expansion likely small foci of myelomalacia      PREVERTEBRAL AND PARASPINAL SOFT TISSUES:  Normal      VISUALIZED POSTERIOR FOSSA:  The visualized posterior fossa demonstrates no abnormal signal      CERVICAL DISC SPACES:     C2-C3:  Normal      C3-C4:  There is a disc osteophyte complex with a superimposed left neural foraminal disc protrusion  Moderate left neural foraminal narrowing  Mild to moderate central canal narrowing  Mild right neural foraminal narrowing      C4-C5:  Small right paracentral distribution  Mild central canal narrowing  Mild right neural foraminal narrowing  Left neural foramen patent      C5-C6:  There is loss of disc height and signal   There is a disc osteophyte complex with a superimposed left neural foraminal disc protrusion  Mild to moderate central canal narrowing  Moderate to severe left neural foraminal narrowing  Mild right   neural foraminal narrowing      C6-C7:  There is a disc osteophyte complex with a superimposed right neural foraminal disc protrusion  Moderate to severe central canal narrowing  Severe right neural foraminal narrowing  Moderate left neural foraminal narrowing      C7-T1:  Right neural foraminal disc protrusion  Moderate right neural foraminal narrowing  Central canal minimally narrowed  Mild left neural foraminal narrowing      UPPER THORACIC DISC SPACES:  Normal      IMPRESSION:     1   Multilevel spondylosis most pronounced at C5-C6 and C6-C7   2   Subtle foci of cord signal abnormality in the lower cervical cord at the superior endplates of C6 and C7 without cord expansion, likely small foci of myelomalacia        Cervical spondylosis and degenerative disc disease seen on x-ray of of cervical spine

## 2022-10-21 NOTE — PATIENT INSTRUCTIONS
Neck Exercises   AMBULATORY CARE:   Neck exercises  help reduce neck pain, and improve neck movement and strength  Neck exercises also help prevent long-term neck problems  What you need to know about neck exercises:   Do the exercises every day,  or as often as directed by your healthcare provider  Move slowly, gently, and smoothly  Avoid fast or jerky motions  Stand and sit the way your healthcare provider shows you  Good posture may reduce your neck pain  Check your posture often, even when you are not doing your neck exercises  How to perform neck exercises safely:   Exercise position:  You may sit or stand while you do neck exercises  Face forward  Your shoulders should be straight and relaxed, with a good posture  Head tilts, forward and back:  Gently bow your head and try to touch your chin to your chest  Your healthcare provider may tell you to push on the back of your neck to help bow your head  Raise your chin back to the starting position  Tilt your head back as far as possible so you are looking up at the ceiling  Your healthcare provider may tell you to lift your chin to help tilt your head back  Return your head to the starting position  Head tilts, side to side:  Tilt your head, bringing your ear toward your shoulder  Then tilt your head toward the other shoulder  Head turns:  Turn your head to look over your shoulder  Tilt your chin down and try to touch it to your shoulder  Do not raise your shoulder to your chin  Face forward again  Do the same on the other side  Head rolls:  Slowly bring your chin toward your chest  Next, roll your head to the right  Your ear should be positioned over your shoulder  Hold this position for 5 seconds  Roll your head back toward your chest and to the left into the same position  Hold for 5 seconds  Gently roll your head back and around in a clockwise [place] 3 times   Next, move your head in the reverse direction (counterclockwise) in a [place] 3 times  Do not shrug your shoulders upwards while you do this exercise  Contact your healthcare provider if:   Your pain does not get better, or gets worse  You have questions or concerns about your condition, care, or exercise program     © Copyright [employer] 2022 Information is for End User's use only and may not be sold, redistributed or otherwise used for commercial purposes  All illustrations and images included in CareNotes® are the copyrighted property of [employer]  or Irma Becker  The above information is an  only  It is not intended as medical advice for individual conditions or treatments  Talk to your doctor, nurse or pharmacist before following any medical regimen to see if it is safe and effective for you

## 2022-11-02 ENCOUNTER — DOCTOR'S OFFICE (OUTPATIENT)
Dept: URBAN - NONMETROPOLITAN AREA CLINIC 1 | Facility: CLINIC | Age: 62
Setting detail: OPHTHALMOLOGY
End: 2022-11-02
Payer: COMMERCIAL

## 2022-11-02 ENCOUNTER — RX ONLY (RX ONLY)
Age: 62
End: 2022-11-02

## 2022-11-02 DIAGNOSIS — Z96.1: ICD-10-CM

## 2022-11-02 DIAGNOSIS — H40.013: ICD-10-CM

## 2022-11-02 DIAGNOSIS — H04.122: ICD-10-CM

## 2022-11-02 DIAGNOSIS — H47.011: ICD-10-CM

## 2022-11-02 DIAGNOSIS — E11.9: ICD-10-CM

## 2022-11-02 DIAGNOSIS — H43.812: ICD-10-CM

## 2022-11-02 DIAGNOSIS — H04.121: ICD-10-CM

## 2022-11-02 PROCEDURE — 92133 CPTRZD OPH DX IMG PST SGM ON: CPT | Performed by: OPHTHALMOLOGY

## 2022-11-02 PROCEDURE — 92012 INTRM OPH EXAM EST PATIENT: CPT | Performed by: OPHTHALMOLOGY

## 2022-11-02 ASSESSMENT — PACHYMETRY
OS_CT_UM: 584
OD_CT_UM: 575
OS_CT_CORRECTION: -3
OD_CT_CORRECTION: -2

## 2022-11-02 ASSESSMENT — REFRACTION_CURRENTRX
OD_OVR_VA: 20/
OD_VPRISM_DIRECTION: SV
OS_AXIS: 97
OS_SPHERE: -7.50
OD_CYLINDER: -1.50
OD_SPHERE: -8.75
OD_AXIS: 104
OS_CYLINDER: -1.75
OS_VPRISM_DIRECTION: SV
OS_OVR_VA: 20/

## 2022-11-02 ASSESSMENT — VISUAL ACUITY
OD_BCVA: 20/20-1
OS_BCVA: 20/40-1

## 2022-11-02 ASSESSMENT — TONOMETRY
OS_IOP_MMHG: 21
OD_IOP_MMHG: 19

## 2022-11-02 ASSESSMENT — REFRACTION_AUTOREFRACTION
OD_CYLINDER: -0.50
OD_SPHERE: -0.50
OS_CYLINDER: -1.00
OS_AXIS: 124
OS_SPHERE: 0.00
OD_AXIS: 112

## 2022-11-02 ASSESSMENT — CONFRONTATIONAL VISUAL FIELD TEST (CVF)
OD_FINDINGS: FULL
OS_FINDINGS: FULL

## 2022-11-02 ASSESSMENT — DRY EYES - PHYSICIAN NOTES
OS_GENERALCOMMENTS: K SICCA
OD_GENERALCOMMENTS: K SICCA

## 2022-11-02 ASSESSMENT — SPHEQUIV_DERIVED
OS_SPHEQUIV: -0.5
OD_SPHEQUIV: -0.75

## 2022-11-09 ENCOUNTER — ANESTHESIA EVENT (OUTPATIENT)
Dept: GASTROENTEROLOGY | Facility: HOSPITAL | Age: 62
End: 2022-11-09

## 2022-11-10 ENCOUNTER — TELEPHONE (OUTPATIENT)
Dept: RADIOLOGY | Facility: CLINIC | Age: 62
End: 2022-11-10

## 2022-11-10 ENCOUNTER — APPOINTMENT (OUTPATIENT)
Dept: RADIOLOGY | Facility: HOSPITAL | Age: 62
End: 2022-11-10

## 2022-11-10 ENCOUNTER — HOSPITAL ENCOUNTER (OUTPATIENT)
Facility: HOSPITAL | Age: 62
Setting detail: OUTPATIENT SURGERY
Discharge: HOME | End: 2022-11-10
Attending: ANESTHESIOLOGY | Admitting: ANESTHESIOLOGY

## 2022-11-10 ENCOUNTER — ANESTHESIA (OUTPATIENT)
Dept: GASTROENTEROLOGY | Facility: HOSPITAL | Age: 62
End: 2022-11-10

## 2022-11-10 VITALS
TEMPERATURE: 98.1 F | DIASTOLIC BLOOD PRESSURE: 59 MMHG | HEIGHT: 68 IN | OXYGEN SATURATION: 97 % | BODY MASS INDEX: 28.79 KG/M2 | SYSTOLIC BLOOD PRESSURE: 108 MMHG | HEART RATE: 68 BPM | WEIGHT: 190 LBS | RESPIRATION RATE: 18 BRPM

## 2022-11-10 PROBLEM — F17.200 SMOKER: Status: ACTIVE | Noted: 2022-11-10

## 2022-11-10 LAB — GLUCOSE SERPL-MCNC: 169 MG/DL (ref 65–140)

## 2022-11-10 RX ORDER — ONDANSETRON 2 MG/ML
4 INJECTION INTRAMUSCULAR; INTRAVENOUS ONCE AS NEEDED
Status: DISCONTINUED | OUTPATIENT
Start: 2022-11-10 | End: 2022-11-10 | Stop reason: HOSPADM

## 2022-11-10 RX ORDER — DEXAMETHASONE SODIUM PHOSPHATE 10 MG/ML
INJECTION, SOLUTION INTRAMUSCULAR; INTRAVENOUS AS NEEDED
Status: DISCONTINUED | OUTPATIENT
Start: 2022-11-10 | End: 2022-11-10 | Stop reason: HOSPADM

## 2022-11-10 RX ORDER — LIDOCAINE HYDROCHLORIDE 10 MG/ML
10 INJECTION, SOLUTION EPIDURAL; INFILTRATION; INTRACAUDAL; PERINEURAL ONCE
Status: DISCONTINUED | OUTPATIENT
Start: 2022-11-10 | End: 2022-11-10 | Stop reason: HOSPADM

## 2022-11-10 RX ORDER — FENTANYL CITRATE 50 UG/ML
INJECTION, SOLUTION INTRAMUSCULAR; INTRAVENOUS AS NEEDED
Status: DISCONTINUED | OUTPATIENT
Start: 2022-11-10 | End: 2022-11-10

## 2022-11-10 RX ORDER — DEXMEDETOMIDINE HYDROCHLORIDE 100 UG/ML
INJECTION, SOLUTION INTRAVENOUS AS NEEDED
Status: DISCONTINUED | OUTPATIENT
Start: 2022-11-10 | End: 2022-11-10

## 2022-11-10 RX ORDER — LIDOCAINE HYDROCHLORIDE 10 MG/ML
INJECTION, SOLUTION EPIDURAL; INFILTRATION; INTRACAUDAL; PERINEURAL AS NEEDED
Status: DISCONTINUED | OUTPATIENT
Start: 2022-11-10 | End: 2022-11-10 | Stop reason: HOSPADM

## 2022-11-10 RX ORDER — BUPIVACAINE HYDROCHLORIDE 2.5 MG/ML
INJECTION, SOLUTION EPIDURAL; INFILTRATION; INTRACAUDAL AS NEEDED
Status: DISCONTINUED | OUTPATIENT
Start: 2022-11-10 | End: 2022-11-10 | Stop reason: HOSPADM

## 2022-11-10 RX ORDER — METHYLPREDNISOLONE ACETATE 80 MG/ML
80 INJECTION, SUSPENSION INTRA-ARTICULAR; INTRALESIONAL; INTRAMUSCULAR; PARENTERAL; SOFT TISSUE ONCE
Status: DISCONTINUED | OUTPATIENT
Start: 2022-11-10 | End: 2022-11-10 | Stop reason: HOSPADM

## 2022-11-10 RX ORDER — LIDOCAINE HYDROCHLORIDE 20 MG/ML
INJECTION, SOLUTION EPIDURAL; INFILTRATION; INTRACAUDAL; PERINEURAL AS NEEDED
Status: DISCONTINUED | OUTPATIENT
Start: 2022-11-10 | End: 2022-11-10 | Stop reason: HOSPADM

## 2022-11-10 RX ORDER — MIDAZOLAM HYDROCHLORIDE 2 MG/2ML
INJECTION, SOLUTION INTRAMUSCULAR; INTRAVENOUS AS NEEDED
Status: DISCONTINUED | OUTPATIENT
Start: 2022-11-10 | End: 2022-11-10

## 2022-11-10 RX ORDER — PROPOFOL 10 MG/ML
INJECTION, EMULSION INTRAVENOUS AS NEEDED
Status: DISCONTINUED | OUTPATIENT
Start: 2022-11-10 | End: 2022-11-10

## 2022-11-10 RX ORDER — PROPOFOL 10 MG/ML
INJECTION, EMULSION INTRAVENOUS CONTINUOUS PRN
Status: DISCONTINUED | OUTPATIENT
Start: 2022-11-10 | End: 2022-11-10

## 2022-11-10 RX ORDER — SODIUM CHLORIDE, SODIUM LACTATE, POTASSIUM CHLORIDE, CALCIUM CHLORIDE 600; 310; 30; 20 MG/100ML; MG/100ML; MG/100ML; MG/100ML
INJECTION, SOLUTION INTRAVENOUS CONTINUOUS PRN
Status: DISCONTINUED | OUTPATIENT
Start: 2022-11-10 | End: 2022-11-10

## 2022-11-10 RX ORDER — BUPIVACAINE HCL/PF 2.5 MG/ML
5 VIAL (ML) INJECTION ONCE
Status: DISCONTINUED | OUTPATIENT
Start: 2022-11-10 | End: 2022-11-10 | Stop reason: HOSPADM

## 2022-11-10 RX ADMIN — FENTANYL CITRATE 25 MCG: 50 INJECTION INTRAMUSCULAR; INTRAVENOUS at 08:42

## 2022-11-10 RX ADMIN — PROPOFOL 20 MG: 10 INJECTION, EMULSION INTRAVENOUS at 08:55

## 2022-11-10 RX ADMIN — MIDAZOLAM 2 MG: 1 INJECTION INTRAMUSCULAR; INTRAVENOUS at 08:33

## 2022-11-10 RX ADMIN — DEXMEDETOMIDINE HCL 12 MCG: 100 INJECTION INTRAVENOUS at 08:42

## 2022-11-10 RX ADMIN — FENTANYL CITRATE 50 MCG: 50 INJECTION INTRAMUSCULAR; INTRAVENOUS at 08:33

## 2022-11-10 RX ADMIN — DEXMEDETOMIDINE HCL 8 MCG: 100 INJECTION INTRAVENOUS at 08:33

## 2022-11-10 RX ADMIN — SODIUM CHLORIDE, SODIUM LACTATE, POTASSIUM CHLORIDE, AND CALCIUM CHLORIDE: .6; .31; .03; .02 INJECTION, SOLUTION INTRAVENOUS at 08:36

## 2022-11-10 RX ADMIN — FENTANYL CITRATE 25 MCG: 50 INJECTION INTRAMUSCULAR; INTRAVENOUS at 08:44

## 2022-11-10 RX ADMIN — PROPOFOL 100 MCG/KG/MIN: 10 INJECTION, EMULSION INTRAVENOUS at 08:56

## 2022-11-10 RX ADMIN — PROPOFOL 30 MG: 10 INJECTION, EMULSION INTRAVENOUS at 08:54

## 2022-11-10 NOTE — INTERVAL H&P NOTE
H&P reviewed  After examining the patient I find no changes in the patients condition since the H&P had been written      Vitals:    11/10/22 0707   BP: 121/68   Pulse: 73   Resp: 18   Temp: 98 3 °F (36 8 °C)   SpO2: 97%

## 2022-11-10 NOTE — ANESTHESIA PREPROCEDURE EVALUATION
Procedure:  RHIZOTOMY C4, C5, C6 medial branch nerves (Left Spine Lumbar)    Relevant Problems   CARDIO   (+) Hypertension      GI/HEPATIC   (+) GERD (gastroesophageal reflux disease)      /RENAL   (+) Stage 3a chronic kidney disease (HCC)      MUSCULOSKELETAL   (+) Cervical spondylosis   (+) Fibromyalgia, primary   (+) Osteoarthritis      NEURO/PSYCH   (+) Chronic pain disorder   (+) Depression   (+) Fibromyalgia, primary      PULMONARY   (+) Smoker        Physical Exam    Airway    Mallampati score: III  TM Distance: >3 FB  Neck ROM: full     Dental       Cardiovascular      Pulmonary      Other Findings        Anesthesia Plan  ASA Score- 2     Anesthesia Type- IV sedation with anesthesia with ASA Monitors  Additional Monitors:   Airway Plan:           Plan Factors-    Chart reviewed  Patient summary reviewed  Patient is a current smoker  Patient did not smoke on day of surgery  Induction- intravenous  Postoperative Plan-     Informed Consent- Anesthetic plan and risks discussed with patient  I personally reviewed this patient with the CRNA  Discussed and agreed on the Anesthesia Plan with the CRNA           This is a 62 y o  female who presents for RHIZOTOMY C4, C5, C6 medial branch nerves (Left Spine Lumbar)  -- VITALS --  /68   Pulse 73   Temp 98 3 °F (36 8 °C) (Oral)   Resp 18   Ht 5' 8" (1 727 m)   Wt 86 2 kg (190 lb)   SpO2 97%   BMI 28 89 kg/m²   BP Readings from Last 3 Encounters:   11/10/22 121/68   10/21/22 116/72   09/29/22 135/72     -- LABS --  No results for input(s): SODIUM, K, CL, CO2, AGAP, BUN, CREATININE, CALCIUM, GLUC, CAION, PHOS, MG, AST, ALT, ALKPHOS, TBILI, ALB in the last 8784 hours  No results for input(s): WBC, HGB, HCT, PLT in the last 8784 hours  No results for input(s): APTT, INR, PTT in the last 8784 hours      -- ANESTHESIA RISK ASSESSMENT AND SHARED DECISION MAKING --  • Benefits and role of specialized anesthesia care discussed ([address] [id number], PMID [id number]): (1) Specialized anesthesiology support reduces mortality for major surgery by about 100-fold, (2) Anesthesia provides amnesia to the extent appropriate and possible, and (3) Anesthesia works to address analgesia and other symptom control  • The mortality risks associated with anesthesia based on ASA-PS were discussed (PMID [id number]): ASA-PS I 1:250,000; ASA-PS II 1:20,000; ASA-PS III 1:3,500; ASA-PS IV 1:1,800  • The morbidity risks discussed included were but not limited to the following (PMID [id number]):  (1) Neurologic risks: I discussed IntraOp awareness (Risk is ~1:1,000 - 1:14,000, PMID [id number]), Stroke (Risk ~<0 1-2% for most cases, PMID [id number]), and POCD  I also exhorted the patient to avoid driving or performing any other physically or mentally hazardous tasks for 24 hrs after anesthesia  (2) Airway / Pulmonary risks: I discussed the potential for dental or mouth injury, throat pain, critical hypoxia (which can lead to strokes and organ damage), pneumothorax, and any relevant concerns for prolonged intubation   - Airway considerations: No prior advanced airway [facility] in [facility]   - Pulmonary risks: Simplified ARISCAT score (Major RFs: none) and estimated risk of pulmonary complications is: 7-3= Low, 1 6%  (3) Cardiovascular risks: I discussed periOp MACE risk (see below) as well as risks of bleeding, infection, or injury to adjacent structures related to relevant vascular access  I discussed risks associated with bypass circuits if relevant  (a) EKG:   EKG Not available  (b) Echo/Relevant Imaging:   TTE not available  (c) Risks for major acute cardiac instability: none      (d) Rolando's RCRI (Major RFs: none) and estimate risk of MACE is: 0= 0 4%  (e) Beta blockers indicated?: took home metop already    (4) FEN/GI risks: I discussed the risk of aspiration (Approximately 0 5% risk per IRIS trial) and PONV (10-80% depending on Apfel criteria)    - Patient meets ASA NPO guidelines: yes    (5) Drug reactions, allergic reactions, overdoses and other drug-related risks:  - Anticoagulation status: ASA  - Patient took the following medications this morning: allopurinol, amlodipine, ASA, cymbalta, famotidine, flonase, amaryl, jardiance, metop, quetiapine, statin, rybelsus, valsartan, vascepa  - Personal or family history of anesthesia related complications: no  - Pregnancy Status: Not applicable

## 2022-11-10 NOTE — ANESTHESIA POSTPROCEDURE EVALUATION
Post-Op Assessment Note    CV Status:  Stable  Pain Score: 0    Pain management: adequate  Multimodal analgesia used between 6 hours prior to anesthesia start to PACU discharge    Mental Status:  Awake and alert   Hydration Status:  Euvolemic and stable   PONV Controlled:  None   Airway Patency:  Patent   Two or more mitigation strategies used for obstructive sleep apnea   Post Op Vitals Reviewed: Yes      Staff: CRNA         No complications documented      /55 (11/10/22 0905)    Temp 98 1 °F (36 7 °C) (11/10/22 0905)    Pulse 68 (11/10/22 0905)   Resp 18 (11/10/22 0905)    SpO2 98 % (11/10/22 0905)

## 2022-11-10 NOTE — PROCEDURES
Pre-procedure Diagnosis: Cervical Facet Arthropathy  Post-procedure Diagnosis: Cervical Facet Arthropathy  Procedure Title(s):  1  Radiofrequency ablation of [LEFT C4, C5, C6] medial branch nerves      2  Intraoperative fluoroscopy  Attending Surgeon:   Santi Al MD  Anesthesia:   Local & IV sedation with Anesthesia    Indications: The patient is a 62y o  year-old female with a diagnosis of cervical facet arthropathy  The patient's history and physical exam were reviewed  The patient has previously undergone diagnostic and confirmatory cervical medial branch blocks  The risks, benefits and alternatives to the procedure were discussed, and all questions were answered to the patient's satisfaction  The patient agreed to proceed, and written informed consent was obtained  Procedure in Detail: The patient was brought into the procedure room and placed in the prone position on the fluoroscopy table  The area of the cervical spine was prepped with chloraprep solution times two and draped in a sterile manner  AP fluoroscopic views were used to identify and sarah the midpoint of the articular pillars of the [C4, C5, C6] levels on the [LEFT] side  The skin and subcutaneous tissues in these areas were anesthetized with 1% lidocaine  A 20-gauge, 100mm length, 10mm active tip radiofrequency probe was advanced toward the targeted points until bone was contacted  At this point, lateral fluoroscopic views were obtained, and the needle tips were advanced to the centroid of the facets at each level  Motor stimulation was performed at 2 Hz and 1 2 volts generating a twitch in the neck muscles with no motor activity in the upper extremities  This was repeated for each level  0 5ml of 2% lidocaine was injected prior to lesioning, which was performed for 90 seconds at 80 degrees centigrade  The lesion was repeated after slight repositioning of needles under fluoroscopic guidance   Once the lesion was complete, 1 ml of a solution consisting of 3mL 0 25% bupivacaine and 1mL Dexamethasone (10mg/mL) was injected through each probe  The probes were removed with a 1% lidocaine flush  The patient's neck was cleaned, and bandages were placed at the needle insertion sites  The needles were removed, and the patient's neck was cleaned  Bandages were placed over the points of needle insertion  Disposition: The patient tolerated the procedure well, and there were no apparent complications  The patient was taken to the recovery area where written discharge instructions for the procedure were given       Estimated Blood Loss: None  Specimens Obtained: N/A

## 2022-11-10 NOTE — OP NOTE
OPERATIVE REPORT  PATIENT NAME: James Shaikh    :  1960  MRN: [id number]  Pt Location:  GI ROOM 01    SURGERY DATE: 11/10/2022    Surgeon(s) and Role: Regis Isbell MD - Primary    Preop Diagnosis:  Cervical spondylosis [M47 812]    Post-Op Diagnosis Codes:     * Cervical spondylosis [M47 812]    Procedure(s) (LRB):  RHIZOTOMY C4, C5, C6 medial branch nerves (Left)    Specimen(s):  * No specimens in log *    Estimated Blood Loss:   Minimal    Drains:  * No LDAs found *    Anesthesia Type:   IV Sedation with Anesthesia    Operative Indications:  Cervical spondylosis [N07 217]    Operative Findings:  Left C4, C5, C6 medial branch nerve regions identified under fluoroscopic guidance   Appropriate motor testing performed prior to radiofrequency lesioning of medial branch nerve regions      Complications:   None    Procedure and Technique:  Please see detailed procedure note     I was present for the entire procedure    Patient Disposition:  PACU     SIGNATURE: Alexis Loyd MD  DATE: November 10, 2022  TIME: 9:15 AM

## 2022-11-10 NOTE — DISCHARGE INSTRUCTIONS
YOUR 2 WEEK FOLLOW UP HAS BEEN SCHEDULED; IF YOU WISH TO CHANGE THE FOLLOW UP, PLEASE CALL THE [facility] [place]: [phone]    Cervical Radiofrequency Ablation   WHAT YOU NEED TO KNOW:   What do I need to know about  cervicalradiofrequency ablation? cervicalradiofrequency ablation (RFA) is a procedure used to treat facet joint pain in your  neck  Facet joints are found at the back of each vertebra  A needle electrode is used to send electrical currents to the nerves in your facet joint  The electrical currents create heat that damages the nerve so it cannot send pain signals  How do I prepare for cervical RFA? Your healthcare provider will talk to you about how to prepare for this procedure  He may tell you not to eat or drink anything after midnight on the day of your procedure  He will tell you what medicines to take or not take on the day of your procedure  What will happen during cervical RFA? You will lie on your stomach  You will be given local anesthesia to numb the area of your  Neck where the needle electrode will be inserted  You may be given a sedative to help keep you relaxed  You may still feel pressure or pushing during the procedure, but you should not feel any pain  Your healthcare provider will use fluoroscopy (a type of x-ray) to guide the needle electrode to the nerves near your facet joint  Your healthcare provider may touch the affected nerve to make sure the needle electrode is in the right place  You will feel tingling or pressure when he does this  He will then apply local anesthesia to the nerve to numb it  This will prevent you from feeling pain when he applies heat to the nerve  Your healthcare provider will then apply heat to the nerve using the needle electrode  He may need to apply heat to more than one nerve  He will remove the needle electrode and apply a bandage over the area  What are the risks of  cervical RFA?   You may have pain, numbness, tingling, or burning in the area where the lumbar RFA was done  These normally go away within 6 weeks  The needle electrode may injure your spinal nerves  This may cause permanent arm weakness or nerve pain  CARE AGREEMENT:   You have the right to help plan your care  Learn about your health condition and how it may be treated  Discuss treatment options with your healthcare providers to decide what care you want to receive  You always have the right to refuse treatment  The above information is an  only  It is not intended as medical advice for individual conditions or treatments  Talk to your doctor, nurse or pharmacist before following any medical regimen to see if it is safe and effective for you  © Copyright [address] Information is for End User's use only and may not be sold, redistributed or otherwise used for commercial purposes   All illustrations and images included in CareNotes® are the copyrighted property of [employer]  or [address] [place] [address] [employer]

## 2022-11-11 NOTE — TELEPHONE ENCOUNTER
FYI  S/w pt  States doing "wonderful " Denies any concerns  Aware of f/u ov    Pt states "thank you "

## 2023-01-31 RX ORDER — ENALAPRIL MALEATE AND HYDROCHLOROTHIAZIDE 10; 25 MG/1; MG/1
1 TABLET ORAL DAILY
COMMUNITY
Start: 2021-04-09

## 2023-01-31 RX ORDER — PREGABALIN 100 MG/1
100 CAPSULE ORAL 2 TIMES DAILY
COMMUNITY

## 2023-01-31 RX ORDER — METOPROLOL SUCCINATE 25 MG/1
25 TABLET, EXTENDED RELEASE ORAL DAILY
COMMUNITY
Start: 2021-04-09

## 2023-01-31 RX ORDER — AMITRIPTYLINE HYDROCHLORIDE 10 MG/1
10 TABLET, FILM COATED ORAL
COMMUNITY

## 2023-01-31 RX ORDER — SUCRALFATE ORAL 1 G/10ML
1 SUSPENSION ORAL 4 TIMES DAILY
COMMUNITY

## 2023-01-31 RX ORDER — INSULIN LISPRO 100 [IU]/ML
INJECTION, SOLUTION INTRAVENOUS; SUBCUTANEOUS
COMMUNITY

## 2023-01-31 RX ORDER — BRIMONIDINE TARTRATE AND TIMOLOL MALEATE 2; 5 MG/ML; MG/ML
1 SOLUTION OPHTHALMIC EVERY 12 HOURS
COMMUNITY

## 2023-01-31 RX ORDER — OMEPRAZOLE 40 MG/1
40 CAPSULE, DELAYED RELEASE ORAL DAILY
COMMUNITY

## 2023-01-31 RX ORDER — ATORVASTATIN CALCIUM 10 MG/1
10 TABLET, FILM COATED ORAL
COMMUNITY

## 2023-01-31 RX ORDER — DULOXETIN HYDROCHLORIDE 30 MG/1
30 CAPSULE, DELAYED RELEASE ORAL DAILY
COMMUNITY

## 2023-01-31 RX ORDER — AMLODIPINE BESYLATE 10 MG/1
10 TABLET ORAL DAILY
COMMUNITY

## 2023-01-31 RX ORDER — TRAZODONE HYDROCHLORIDE 50 MG/1
TABLET ORAL
COMMUNITY

## 2023-02-28 ENCOUNTER — DOCTOR'S OFFICE (OUTPATIENT)
Dept: URBAN - NONMETROPOLITAN AREA CLINIC 1 | Facility: CLINIC | Age: 63
Setting detail: OPHTHALMOLOGY
End: 2023-02-28
Payer: COMMERCIAL

## 2023-02-28 DIAGNOSIS — H40.013: ICD-10-CM

## 2023-02-28 DIAGNOSIS — H47.011: ICD-10-CM

## 2023-02-28 DIAGNOSIS — Z96.1: ICD-10-CM

## 2023-02-28 PROCEDURE — 99213 OFFICE O/P EST LOW 20 MIN: CPT | Performed by: OPHTHALMOLOGY

## 2023-02-28 PROCEDURE — 92133 CPTRZD OPH DX IMG PST SGM ON: CPT | Performed by: OPHTHALMOLOGY

## 2023-02-28 ASSESSMENT — REFRACTION_AUTOREFRACTION
OS_SPHERE: 0.00
OD_AXIS: 112
OD_CYLINDER: -0.50
OS_AXIS: 124
OD_SPHERE: -0.50
OS_CYLINDER: -1.00

## 2023-02-28 ASSESSMENT — REFRACTION_CURRENTRX
OD_AXIS: 104
OD_SPHERE: -8.75
OD_CYLINDER: -1.50
OS_AXIS: 97
OD_VPRISM_DIRECTION: SV
OS_VPRISM_DIRECTION: SV
OS_OVR_VA: 20/
OD_OVR_VA: 20/
OS_SPHERE: -7.50
OS_CYLINDER: -1.75

## 2023-02-28 ASSESSMENT — PACHYMETRY
OS_CT_UM: 584
OS_CT_CORRECTION: -3
OD_CT_CORRECTION: -2
OD_CT_UM: 575

## 2023-02-28 ASSESSMENT — CONFRONTATIONAL VISUAL FIELD TEST (CVF)
OD_FINDINGS: FULL
OS_FINDINGS: FULL

## 2023-02-28 ASSESSMENT — SPHEQUIV_DERIVED
OS_SPHEQUIV: -0.5
OD_SPHEQUIV: -0.75

## 2023-02-28 ASSESSMENT — VISUAL ACUITY
OS_BCVA: 20/40-2
OD_BCVA: 20/25

## 2023-02-28 ASSESSMENT — DRY EYES - PHYSICIAN NOTES
OD_GENERALCOMMENTS: K SICCA
OS_GENERALCOMMENTS: K SICCA

## 2023-03-07 ENCOUNTER — TELEPHONE (OUTPATIENT)
Dept: PAIN MEDICINE | Facility: CLINIC | Age: 63
End: 2023-03-07

## 2023-03-07 DIAGNOSIS — M19.90 CHRONIC INFLAMMATORY ARTHRITIS: Primary | ICD-10-CM

## 2023-03-07 NOTE — TELEPHONE ENCOUNTER
Caller: Mila Cates    Doctor: Jimmie Bernheim    Reason for call: patient needs referral sent to rheumatologist placed in my chart     Diane Longoria [phone]  Fax # [phone]      Call back#: [phone]

## 2023-03-07 NOTE — TELEPHONE ENCOUNTER
S/W pt who reports she discussed with VS about seeing a rheumatologist   Pt requesting VS place referral in pt's chart due to [facility] telling pt she needs referral in chart in order to be seen  VS please place referral for [facility] rheumatology, ty

## 2023-03-08 NOTE — TELEPHONE ENCOUNTER
Mauro pt, suggested requesting to be placed on a cancellation list  Pt stated that she was advised that there is no cancellation list  Christiane Bucio to call daily  Advised pt to contact her ins co for a list of in-network rheumatologists   The writer will make VS aware and cb if there is anything different or additional

## 2023-03-08 NOTE — TELEPHONE ENCOUNTER
Caller: Cornel PT    Doctor: Dr Essie Fung     Reason for call: Pt called in regard to the referral to Rheum , pt can not see her until 2024   Does the doctor know any other doctors     Call back#: [phone]

## 2023-05-30 ENCOUNTER — DOCTOR'S OFFICE (OUTPATIENT)
Dept: URBAN - NONMETROPOLITAN AREA CLINIC 1 | Facility: CLINIC | Age: 63
Setting detail: OPHTHALMOLOGY
End: 2023-05-30
Payer: COMMERCIAL

## 2023-05-30 DIAGNOSIS — H43.812: ICD-10-CM

## 2023-05-30 DIAGNOSIS — H04.122: ICD-10-CM

## 2023-05-30 DIAGNOSIS — H47.011: ICD-10-CM

## 2023-05-30 DIAGNOSIS — Z79.4: ICD-10-CM

## 2023-05-30 DIAGNOSIS — Z96.1: ICD-10-CM

## 2023-05-30 DIAGNOSIS — H40.013: ICD-10-CM

## 2023-05-30 DIAGNOSIS — E11.9: ICD-10-CM

## 2023-05-30 DIAGNOSIS — H04.121: ICD-10-CM

## 2023-05-30 DIAGNOSIS — E11.3293: ICD-10-CM

## 2023-05-30 PROCEDURE — 99214 OFFICE O/P EST MOD 30 MIN: CPT | Performed by: OPHTHALMOLOGY

## 2023-05-30 PROCEDURE — 76514 ECHO EXAM OF EYE THICKNESS: CPT | Performed by: OPHTHALMOLOGY

## 2023-05-30 PROCEDURE — 92083 EXTENDED VISUAL FIELD XM: CPT | Performed by: OPHTHALMOLOGY

## 2023-05-30 PROCEDURE — 92250 FUNDUS PHOTOGRAPHY W/I&R: CPT | Performed by: OPHTHALMOLOGY

## 2023-05-30 ASSESSMENT — TONOMETRY
OS_IOP_MMHG: 20
OD_IOP_MMHG: 19

## 2023-05-30 ASSESSMENT — REFRACTION_AUTOREFRACTION
OD_AXIS: 102
OS_SPHERE: -0.25
OS_AXIS: 120
OS_CYLINDER: -0.25
OD_SPHERE: -0.25
OD_CYLINDER: -1.00

## 2023-05-30 ASSESSMENT — REFRACTION_CURRENTRX
OD_CYLINDER: -1.50
OD_OVR_VA: 20/
OD_SPHERE: -8.75
OS_VPRISM_DIRECTION: SV
OS_AXIS: 97
OD_AXIS: 104
OS_SPHERE: -7.50
OD_VPRISM_DIRECTION: SV
OS_CYLINDER: -1.75
OS_OVR_VA: 20/

## 2023-05-30 ASSESSMENT — DRY EYES - PHYSICIAN NOTES
OD_GENERALCOMMENTS: K SICCA
OS_GENERALCOMMENTS: K SICCA

## 2023-05-30 ASSESSMENT — PACHYMETRY
OD_CT_CORRECTION: -3
OD_CT_UM: 583
OS_CT_UM: 589
OS_CT_CORRECTION: -3

## 2023-05-30 ASSESSMENT — VISUAL ACUITY
OS_BCVA: 20/40-1
OD_BCVA: 20/20-1

## 2023-05-30 ASSESSMENT — CONFRONTATIONAL VISUAL FIELD TEST (CVF)
OS_FINDINGS: FULL
OD_FINDINGS: FULL

## 2023-05-30 ASSESSMENT — SPHEQUIV_DERIVED
OS_SPHEQUIV: -0.375
OD_SPHEQUIV: -0.75

## 2023-09-12 ENCOUNTER — HOSPITAL ENCOUNTER (OUTPATIENT)
Dept: ULTRASOUND IMAGING | Facility: HOSPITAL | Age: 63
Discharge: HOME | End: 2023-09-12
Payer: MEDICARE

## 2023-09-12 DIAGNOSIS — E04.2 NONTOXIC MULTINODULAR GOITER: ICD-10-CM

## 2023-09-12 PROCEDURE — 76536 US EXAM OF HEAD AND NECK: CPT

## 2023-10-19 ENCOUNTER — OFFICE VISIT (OUTPATIENT)
Dept: URGENT CARE | Facility: CLINIC | Age: 63
End: 2023-10-19
Payer: MEDICARE

## 2023-10-19 VITALS
OXYGEN SATURATION: 97 % | BODY MASS INDEX: 28.79 KG/M2 | RESPIRATION RATE: 20 BRPM | TEMPERATURE: 98.2 F | DIASTOLIC BLOOD PRESSURE: 74 MMHG | HEART RATE: 81 BPM | WEIGHT: 190 LBS | HEIGHT: 68 IN | SYSTOLIC BLOOD PRESSURE: 118 MMHG

## 2023-10-19 DIAGNOSIS — R41.0 CONFUSION: Primary | ICD-10-CM

## 2023-10-19 LAB
SL AMB  POCT GLUCOSE, UA: ABNORMAL
SL AMB LEUKOCYTE ESTERASE,UA: NEGATIVE
SL AMB POCT BILIRUBIN,UA: NEGATIVE
SL AMB POCT BLOOD,UA: NEGATIVE
SL AMB POCT CLARITY,UA: CLEAR
SL AMB POCT COLOR,UA: YELLOW
SL AMB POCT GLUCOSE BLD: 170
SL AMB POCT KETONES,UA: NEGATIVE
SL AMB POCT NITRITE,UA: NEGATIVE
SL AMB POCT PH,UA: 7
SL AMB POCT SPECIFIC GRAVITY,UA: 1.01
SL AMB POCT URINE PROTEIN: ABNORMAL
SL AMB POCT UROBILINOGEN: NEGATIVE

## 2023-10-19 PROCEDURE — G0463 HOSPITAL OUTPT CLINIC VISIT: HCPCS

## 2023-10-19 PROCEDURE — 87086 URINE CULTURE/COLONY COUNT: CPT

## 2023-10-19 PROCEDURE — 81002 URINALYSIS NONAUTO W/O SCOPE: CPT

## 2023-10-19 PROCEDURE — 99213 OFFICE O/P EST LOW 20 MIN: CPT

## 2023-10-19 PROCEDURE — 82948 REAGENT STRIP/BLOOD GLUCOSE: CPT

## 2023-10-19 NOTE — PROGRESS NOTES
[facility]        NAME: Hannah Cano is a 65 y.o. female  : 1960    MRN: [id number]  DATE: 2023  TIME: 6:11 PM    Assessment and Plan   Confusion [R41.0]  1. Confusion  POCT urine dip    POCT blood glucose    Transfer to other facility        Discussed problem with patient. Urine dip revealed high glucose and blood sugar was 170 today. With symptoms of confusion, dysuria, frequency with chronic kidney disease with 1 kidney advised higher level of care. Patient is opting to go to [facility] and son is driving. Patient Instructions       Follow up with PCP in 3-5 days. Proceed to  ER if symptoms worsen. Chief Complaint     Chief Complaint   Patient presents with   • Possible UTI     C/o burning and frequency of urination x 3-4 days. Reports feeling confused,weak, nausea, chills today. History of Present Illness       C/o burning and frequency of urination x 3-4 days. Reports feeling confused,weak, nausea, chills today. States she "feels confused"- stuttering words. Awake and alert x3. Does not feel like she could pass out. Review of Systems   Review of Systems   Constitutional:  Positive for fatigue. Negative for appetite change, chills and fever. Respiratory:  Negative for cough, shortness of breath, wheezing and stridor. Cardiovascular:  Negative for chest pain and palpitations. Genitourinary:  Positive for dysuria, flank pain, frequency and urgency. Negative for difficulty urinating and pelvic pain. Neurological:  Negative for dizziness, syncope, light-headedness and headaches. Psychiatric/Behavioral:  Positive for confusion.           Current Medications       Current Outpatient Medications:   •  allopurinol (ZYLOPRIM) 100 mg tablet, , Disp: , Rfl:   •  Alphagan P 0.1 %, , Disp: , Rfl:   •  amLODIPine (NORVASC) 10 mg tablet, Take 10 mg by mouth daily, Disp: , Rfl:   •  ASPIRIN 81 PO, Take 81 mg by mouth daily, Disp: , Rfl:   •  Azelastine HCl 0.15 % SOLN, , Disp: , Rfl:   •  cycloSPORINE (RESTASIS) 0.05 % ophthalmic emulsion, Administer 1 drop to both eyes 2 (two) times a day, Disp: , Rfl:   •  DULoxetine (CYMBALTA) 60 mg delayed release capsule, Take 100 mg by mouth daily , Disp: , Rfl:   •  famotidine (PEPCID) 20 mg tablet, Take 20 mg by mouth 2 (two) times a day, Disp: , Rfl:   •  fenofibrate (TRICOR) 48 mg tablet, , Disp: , Rfl:   •  fluticasone (FLONASE) 50 mcg/act nasal spray, 2 sprays into each nostril as needed , Disp: , Rfl:   •  glimepiride (AMARYL) 1 mg tablet, Take 1 mg by mouth every morning before breakfast, Disp: , Rfl:   •  Jardiance 10 MG TABS tablet, , Disp: , Rfl:   •  metoprolol tartrate (LOPRESSOR) 50 mg tablet, Take 50 mg by mouth every 12 (twelve) hours, Disp: , Rfl:   •  polyethylene glycol (GLYCOLAX) 17 GM/SCOOP powder, Take 17 g by mouth daily, Disp: , Rfl:   •  QUEtiapine (SEROquel) 100 mg tablet, Take 100 mg by mouth daily at bedtime, Disp: , Rfl:   •  rosuvastatin (CRESTOR) 5 mg tablet, Take 5 mg by mouth daily, Disp: , Rfl:   •  Rybelsus 7 MG TABS, , Disp: , Rfl:   •  valsartan (DIOVAN) 320 MG tablet, , Disp: , Rfl:   •  Vascepa 1 g CAPS, , Disp: , Rfl:   •  cyclobenzaprine (FLEXERIL) 5 mg tablet, Take 1 tablet (5 mg total) by mouth 3 (three) times a day as needed for muscle spasms (Patient not taking: Reported on 10/19/2023), Disp: 90 tablet, Rfl: 0    Current Allergies     Allergies as of 10/19/2023 - Reviewed 10/19/2023   Allergen Reaction Noted   • Lisinopril-hydrochlorothiazide Throat Swelling 12/21/2020   • Penicillins Hives 12/21/2020   • Nsaids Other (See Comments) 03/18/2021            The following portions of the patient's history were reviewed and updated as appropriate: allergies, current medications, past family history, past medical history, past social history, past surgical history and problem list.     Past Medical History:   Diagnosis Date   • Ambulates with cane     Pt states will use intermittently when fibromyalgia is really bad. 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 Only has one.    • Chronic pain disorder    • Depression    • Diabetes mellitus ([address])     Takes amaryl daily before breakfast   • Fibromyalgia, primary    • GERD (gastroesophageal reflux disease)    • Hypertension    • Osteoarthritis        Past Surgical History:   Procedure Laterality Date   • CATARACT EXTRACTION Bilateral    •  SECTION      2 emergency procedures   • EPIDURAL BLOCK INJECTION N/A 6/15/2021    Procedure: C7 T1 CERVICAL EPIDURAL STEROID INJECTION;  Surgeon: Haseeb Enriquez MD;  Location: [facility];  Service: Pain Management    • EPIDURAL BLOCK INJECTION N/A 2022    Procedure: BLOCK / INJECTION EPIDURAL STEROID CERVICAL C7-T1;  Surgeon: Haseeb Enriquez MD;  Location[facility];  Service: Pain Management    • FL GUIDED NEEDLE PLAC BX/ASP/INJ  9/15/2022   • FL GUIDED NEEDLE PLAC BX/ASP/INJ  2022   • HYSTERECTOMY      Pt only has fallopian tube on the right   • KIDNEY SURGERY Right     "gave a kidney"   • NERVE BLOCK Right 2020    Procedure: C3 C4 C5 C6 MEDIAL BRANCH BLOCK #1;  Surgeon: Haseeb Enriquez MD;  Location: [facility];  Service: Pain Management    • NERVE BLOCK Right 3/23/2021    Procedure: C3 C4 C5 C6 MEDIAL BRANCH BLOCK #2;  Surgeon: Haseeb Enriquez MD;  Location: [facility];  Service: Pain Management    • NERVE BLOCK Left 9/15/2022    Procedure: BLOCK MEDIAL BRANCH C4, C5, C6 #1;  Surgeon: Haseeb Enriquez MD;  [facility];  Service: Pain Management    • NERVE BLOCK Left 2022    Procedure: BLOCK MEDIAL BRANCH C4, C5, C6;  Surgeon: Haseeb Enriquez MD;  [facility];  Service: Pain Management    • RADIOFREQUENCY ABLATION Right 2021    Procedure: C3 C4 C5 C6 RADIO FREQUENCY ABLATION right side;  Surgeon: Haseeb Enriquez MD;  [facility];  Service: Pain Management    • RHIZOTOMY Left 11/10/2022    Procedure: RHIZOTOMY C4, C5, C6 medial branch nerves;  Surgeon: Olvin Rubin MD;  Location: [facility];  Service: Pain Management    • TONSILECTOMY AND ADNOIDECTOMY     • TONSILLECTOMY     • WISDOM TOOTH EXTRACTION     • WRIST SURGERY Right     Carpal tunnel       Family History   Problem Relation Age of Onset   • Hypertension Mother    • No Known Problems Father          Medications have been verified. Objective   /74   Pulse 81   Temp 98.2 °F (36.8 °C)   Resp 20   Ht 5' 8" (1.727 m)   Wt 86.2 kg (190 lb)   SpO2 97%   BMI 28.89 kg/m²        Physical Exam     Physical Exam  Vitals and nursing note reviewed. Constitutional:       General: She is not in acute distress. Appearance: Normal appearance. She is normal weight. She is not ill-appearing, toxic-appearing or diaphoretic. HENT:      Head: Normocephalic. Cardiovascular:      Rate and Rhythm: Normal rate and regular rhythm. Pulses: Normal pulses. Heart sounds: Normal heart sounds. No murmur heard. No friction rub. No gallop. Pulmonary:      Effort: Pulmonary effort is normal. No respiratory distress. Breath sounds: Normal breath sounds. No stridor. No wheezing, rhonchi or rales. Chest:      Chest wall: No tenderness. Abdominal:      General: Abdomen is flat. Bowel sounds are normal. There is no distension. Palpations: Abdomen is soft. There is no mass. Tenderness: There is abdominal tenderness. There is no right CVA tenderness, left CVA tenderness, guarding or rebound. Hernia: No hernia is present. Neurological:      Mental Status: She is alert.

## 2023-10-20 LAB — GLUCOSE SERPL-MCNC: 176 MG/DL (ref 65–140)

## 2023-10-21 LAB — BACTERIA UR CULT: NORMAL

## 2023-12-05 ENCOUNTER — DOCTOR'S OFFICE (OUTPATIENT)
Dept: URBAN - NONMETROPOLITAN AREA CLINIC 1 | Facility: CLINIC | Age: 63
Setting detail: OPHTHALMOLOGY
End: 2023-12-05
Payer: COMMERCIAL

## 2023-12-05 DIAGNOSIS — Z79.4: ICD-10-CM

## 2023-12-05 DIAGNOSIS — H43.812: ICD-10-CM

## 2023-12-05 DIAGNOSIS — H04.122: ICD-10-CM

## 2023-12-05 DIAGNOSIS — H40.013: ICD-10-CM

## 2023-12-05 DIAGNOSIS — H47.011: ICD-10-CM

## 2023-12-05 DIAGNOSIS — H04.121: ICD-10-CM

## 2023-12-05 DIAGNOSIS — Z96.1: ICD-10-CM

## 2023-12-05 DIAGNOSIS — E11.3293: ICD-10-CM

## 2023-12-05 PROCEDURE — 92014 COMPRE OPH EXAM EST PT 1/>: CPT | Performed by: OPHTHALMOLOGY

## 2023-12-05 PROCEDURE — 92083 EXTENDED VISUAL FIELD XM: CPT | Performed by: OPHTHALMOLOGY

## 2023-12-05 PROCEDURE — 92133 CPTRZD OPH DX IMG PST SGM ON: CPT | Performed by: OPHTHALMOLOGY

## 2023-12-05 PROCEDURE — 83861 MICROFLUID ANALY TEARS: CPT | Mod: QW,RT | Performed by: OPHTHALMOLOGY

## 2023-12-05 PROCEDURE — 83861 MICROFLUID ANALY TEARS: CPT | Mod: QW,LT | Performed by: OPHTHALMOLOGY

## 2023-12-05 ASSESSMENT — CONFRONTATIONAL VISUAL FIELD TEST (CVF)
OS_FINDINGS: FULL
OD_FINDINGS: FULL

## 2023-12-05 ASSESSMENT — REFRACTION_CURRENTRX
OS_CYLINDER: -1.75
OD_CYLINDER: -1.50
OD_AXIS: 104
OS_SPHERE: -7.50
OD_OVR_VA: 20/
OS_VPRISM_DIRECTION: SV
OD_SPHERE: -8.75
OD_VPRISM_DIRECTION: SV
OS_AXIS: 97
OS_OVR_VA: 20/

## 2023-12-05 ASSESSMENT — DRY EYES - PHYSICIAN NOTES
OS_GENERALCOMMENTS: K SICCA
OD_GENERALCOMMENTS: K SICCA

## 2023-12-05 ASSESSMENT — REFRACTION_AUTOREFRACTION
OS_SPHERE: -0.25
OS_AXIS: 120
OD_CYLINDER: -1.00
OD_AXIS: 102
OD_SPHERE: -0.25
OS_CYLINDER: -0.25

## 2023-12-05 ASSESSMENT — SPHEQUIV_DERIVED
OS_SPHEQUIV: -0.375
OD_SPHEQUIV: -0.75

## 2024-01-08 ENCOUNTER — DOCTOR'S OFFICE (OUTPATIENT)
Dept: URBAN - NONMETROPOLITAN AREA CLINIC 1 | Facility: CLINIC | Age: 64
Setting detail: OPHTHALMOLOGY
End: 2024-01-08
Payer: COMMERCIAL

## 2024-01-08 DIAGNOSIS — E11.3293: ICD-10-CM

## 2024-01-08 DIAGNOSIS — Z79.4: ICD-10-CM

## 2024-01-08 DIAGNOSIS — H43.812: ICD-10-CM

## 2024-01-08 DIAGNOSIS — H47.011: ICD-10-CM

## 2024-01-08 DIAGNOSIS — Z96.1: ICD-10-CM

## 2024-01-08 DIAGNOSIS — H04.121: ICD-10-CM

## 2024-01-08 DIAGNOSIS — H40.013: ICD-10-CM

## 2024-01-08 DIAGNOSIS — H04.122: ICD-10-CM

## 2024-01-08 PROCEDURE — NO CHARGE N/C PROFESSIONAL COURTESY: Performed by: OPHTHALMOLOGY

## 2024-01-08 ASSESSMENT — REFRACTION_CURRENTRX
OS_OVR_VA: 20/
OS_CYLINDER: -1.75
OD_CYLINDER: -1.50
OD_SPHERE: -8.75
OS_SPHERE: -7.50
OD_AXIS: 104
OD_VPRISM_DIRECTION: SV
OS_AXIS: 97
OD_OVR_VA: 20/
OS_VPRISM_DIRECTION: SV

## 2024-01-08 ASSESSMENT — SPHEQUIV_DERIVED
OS_SPHEQUIV: -0.375
OD_SPHEQUIV: -0.75

## 2024-01-08 ASSESSMENT — REFRACTION_AUTOREFRACTION
OS_AXIS: 120
OS_SPHERE: -0.25
OD_SPHERE: -0.25
OD_CYLINDER: -1.00
OS_CYLINDER: -0.25
OD_AXIS: 102

## 2024-01-12 ENCOUNTER — DOCTOR'S OFFICE (OUTPATIENT)
Dept: URBAN - NONMETROPOLITAN AREA CLINIC 1 | Facility: CLINIC | Age: 64
Setting detail: OPHTHALMOLOGY
End: 2024-01-12
Payer: COMMERCIAL

## 2024-01-12 DIAGNOSIS — H40.013: ICD-10-CM

## 2024-01-12 PROCEDURE — 99213 OFFICE O/P EST LOW 20 MIN: CPT | Performed by: OPHTHALMOLOGY

## 2024-01-12 ASSESSMENT — REFRACTION_AUTOREFRACTION
OD_AXIS: 102
OS_AXIS: 120
OS_SPHERE: -0.25
OD_SPHERE: -0.25
OD_CYLINDER: -1.00
OS_CYLINDER: -0.25

## 2024-01-12 ASSESSMENT — SPHEQUIV_DERIVED
OS_SPHEQUIV: -0.375
OD_SPHEQUIV: -0.75

## 2024-01-12 ASSESSMENT — REFRACTION_CURRENTRX
OD_CYLINDER: -1.50
OS_SPHERE: -7.50
OD_VPRISM_DIRECTION: SV
OD_OVR_VA: 20/
OS_AXIS: 97
OD_SPHERE: -8.75
OS_OVR_VA: 20/
OD_AXIS: 104
OS_VPRISM_DIRECTION: SV
OS_CYLINDER: -1.75

## 2024-01-12 ASSESSMENT — DRY EYES - PHYSICIAN NOTES
OS_GENERALCOMMENTS: K SICCA
OD_GENERALCOMMENTS: K SICCA

## 2024-02-07 ENCOUNTER — DOCTOR'S OFFICE (OUTPATIENT)
Dept: URBAN - NONMETROPOLITAN AREA CLINIC 1 | Facility: CLINIC | Age: 64
Setting detail: OPHTHALMOLOGY
End: 2024-02-07
Payer: COMMERCIAL

## 2024-02-07 DIAGNOSIS — H40.013: ICD-10-CM

## 2024-02-07 DIAGNOSIS — H04.121: ICD-10-CM

## 2024-02-07 DIAGNOSIS — H02.012: ICD-10-CM

## 2024-02-07 DIAGNOSIS — H02.011: ICD-10-CM

## 2024-02-07 DIAGNOSIS — H04.122: ICD-10-CM

## 2024-02-07 PROCEDURE — 99213 OFFICE O/P EST LOW 20 MIN: CPT | Performed by: OPHTHALMOLOGY

## 2024-02-07 ASSESSMENT — LID EXAM ASSESSMENTS: OD_TRICHIASIS: RLL RUL

## 2024-03-06 ENCOUNTER — TELEPHONE (OUTPATIENT)
Age: 64
End: 2024-03-06

## 2024-03-11 ENCOUNTER — HOSPITAL ENCOUNTER (OUTPATIENT)
Dept: RADIOLOGY | Facility: CLINIC | Age: 64
Discharge: HOME | End: 2024-03-11
Payer: MEDICARE

## 2024-03-11 ENCOUNTER — OFFICE VISIT (OUTPATIENT)
Dept: OBGYN CLINIC | Facility: CLINIC | Age: 64
End: 2024-03-11
Payer: MEDICARE

## 2024-03-11 VITALS
HEIGHT: 68 IN | TEMPERATURE: 97.2 F | DIASTOLIC BLOOD PRESSURE: 82 MMHG | SYSTOLIC BLOOD PRESSURE: 118 MMHG | HEART RATE: 66 BPM | BODY MASS INDEX: 28.89 KG/M2

## 2024-03-11 DIAGNOSIS — M25.552 PAIN IN LEFT HIP: ICD-10-CM

## 2024-03-11 DIAGNOSIS — M79.7 FIBROMYALGIA, PRIMARY: ICD-10-CM

## 2024-03-11 DIAGNOSIS — G89.4 CHRONIC PAIN DISORDER: ICD-10-CM

## 2024-03-11 DIAGNOSIS — M25.552 PAIN IN LEFT HIP: Primary | ICD-10-CM

## 2024-03-11 DIAGNOSIS — M70.62 TROCHANTERIC BURSITIS OF LEFT HIP: ICD-10-CM

## 2024-03-11 PROCEDURE — 99204 OFFICE O/P NEW MOD 45 MIN: CPT | Performed by: ORTHOPAEDIC SURGERY

## 2024-03-11 PROCEDURE — 73502 X-RAY EXAM HIP UNI 2-3 VIEWS: CPT

## 2024-03-11 PROCEDURE — 20610 DRAIN/INJ JOINT/BURSA W/O US: CPT | Performed by: ORTHOPAEDIC SURGERY

## 2024-03-11 RX ORDER — TRIAMCINOLONE ACETONIDE 40 MG/ML
40 INJECTION, SUSPENSION INTRA-ARTICULAR; INTRAMUSCULAR
Status: COMPLETED | OUTPATIENT
Start: 2024-03-11 | End: 2024-03-11

## 2024-03-11 RX ORDER — BUPIVACAINE HYDROCHLORIDE 2.5 MG/ML
4 INJECTION, SOLUTION INFILTRATION; PERINEURAL
Status: COMPLETED | OUTPATIENT
Start: 2024-03-11 | End: 2024-03-11

## 2024-03-11 RX ADMIN — TRIAMCINOLONE ACETONIDE 40 MG: 40 INJECTION, SUSPENSION INTRA-ARTICULAR; INTRAMUSCULAR at 11:30

## 2024-03-11 RX ADMIN — BUPIVACAINE HYDROCHLORIDE 4 ML: 2.5 INJECTION, SOLUTION INFILTRATION; PERINEURAL at 11:30

## 2024-03-11 NOTE — PROGRESS NOTES
ASSESSMENT/PLAN:    Diagnoses and all orders for this visit:    Pain in left hip  -     XR hip/pelv 2-3 vws left if performed; Future    Trochanteric bursitis of left hip    Chronic pain disorder    Fibromyalgia, primary        Plan: Treatment was discussed.  She is unwilling to consider physical therapy indicating that physical therapy does not provide her any benefit because of her fibromyalgia.  She was agreeable to proceed with injection and this was performed without difficulty and tolerated well.  I will see her in 2 weeks for patient.  She is to contact me if questions or concerns arise in the interim.  She may continue the Voltaren gel if she chooses.  If symptoms have not responded, stronger consideration for physical therapy would be present.    Return in about 2 weeks (around 3/25/2024).      _____________________________________________________  CHIEF COMPLAINT:  Chief Complaint   Patient presents with    Left Hip - Pain    Left Leg - Pain         SUBJECTIVE:  Lakisha Sanchez is a 63 y.o. year old female who presents for initial evaluation of her left hip. The patient states that her pain has been present for approximately 1 month, with no known mechanism of injury or trauma. The patient has a history of fibromyalgia and initially suspected that the pain was related to a flare-up. However, the pain continued, therefore, she reported to her PCP on 2/21/2024 where she was prescribed a medrol dose pack  for 5 days, with significant benefit. However, once the prescription ended she states that the pain immediately returned. She was also provided a prescription of voltaren gel and declined a prescription for PT.  She was referred for orthopedic consultation and treatment.  On today's presentation, she reports lateral sided hip pain. She states that the pain does not travel down past her knee.  She denies any lower extremity paresthesias.  She has experienced an occasional episode of buttock pain.  She denies any  "groin or medial thigh pain.    PAST MEDICAL HISTORY:  Past Medical History:   Diagnosis Date    Ambulates with cane     Pt states will use intermittently when fibromyalgia is really bad.    Chronic kidney disease     Pt donated one of her kidneys to her spouse. Only has one.    Chronic pain disorder     Depression     Diabetes mellitus (HCC)     Takes amaryl daily before breakfast    Fibromyalgia, primary     GERD (gastroesophageal reflux disease)     Hypertension     Osteoarthritis        PAST SURGICAL HISTORY:  Past Surgical History:   Procedure Laterality Date    CATARACT EXTRACTION Bilateral      SECTION      2 emergency procedures    EPIDURAL BLOCK INJECTION N/A 6/15/2021    Procedure: C7 T1 CERVICAL EPIDURAL STEROID INJECTION;  Surgeon: Montez Alicea MD;  Location: [facility];  Service: Pain Management     EPIDURAL BLOCK INJECTION N/A 2022    Procedure: BLOCK / INJECTION EPIDURAL STEROID CERVICAL C7-T1;  Surgeon: Montez Alicea MD;  Location: [facility];  Service: Pain Management     FL GUIDED NEEDLE PLAC BX/ASP/INJ  9/15/2022    FL GUIDED NEEDLE PLAC BX/ASP/INJ  2022    HYSTERECTOMY      Pt only has fallopian tube on the right    KIDNEY SURGERY Right     \"gave a kidney\"    NERVE BLOCK Right 2020    Procedure: C3 C4 C5 C6 MEDIAL BRANCH BLOCK #1;  Surgeon: Montez Alicea MD;  Location: [facility];  Service: Pain Management     NERVE BLOCK Right 3/23/2021    Procedure: C3 C4 C5 C6 MEDIAL BRANCH BLOCK #2;  Surgeon: Montez Alicea MD;  Location: [facility];  Service: Pain Management     NERVE BLOCK Left 9/15/2022    Procedure: BLOCK MEDIAL BRANCH C4, C5, C6 #1;  Surgeon: Montez Alicea MD;  Location: [facility];  Service: Pain Management     NERVE BLOCK Left 2022    Procedure: BLOCK MEDIAL BRANCH C4, C5, C6;  Surgeon: Montez Alicea MD;  Location: [facility];  Service: Pain Management     RADIOFREQUENCY ABLATION Right 2021    Procedure: C3 C4 C5 C6 RADIO FREQUENCY ABLATION right " "side;  Surgeon: Montez Alicea MD;  Location: [facility];  Service: Pain Management     RHIZOTOMY Left 11/10/2022    Procedure: RHIZOTOMY C4, C5, C6 medial branch nerves;  Surgeon: Montez Alicea MD;  Location: [facility];  Service: Pain Management     TONSILECTOMY AND ADNOIDECTOMY      TONSILLECTOMY      WISDOM TOOTH EXTRACTION      WRIST SURGERY Right     Carpal tunnel       FAMILY HISTORY:  Family History   Problem Relation Age of Onset    Hypertension Mother     No Known Problems Father        SOCIAL HISTORY:  Social History     Tobacco Use    Smoking status: Light Smoker     Types: Cigarettes    Smokeless tobacco: Never    Tobacco comments:     \"only smoke when I drink\"   Vaping Use    Vaping status: Never Used   Substance Use Topics    Alcohol use: Yes     Alcohol/week: 2.0 - 4.0 standard drinks of alcohol     Types: 2 - 4 Glasses of wine per week     Comment: \"who counts\"    Drug use: Yes     Frequency: 1.0 times per week     Types: Marijuana     Comment: \"For the pain\"       MEDICATIONS:    Current Outpatient Medications:     allopurinol (ZYLOPRIM) 100 mg tablet, , Disp: , Rfl:     amLODIPine (NORVASC) 10 mg tablet, Take 10 mg by mouth daily, Disp: , Rfl:     ASPIRIN 81 PO, Take 81 mg by mouth daily, Disp: , Rfl:     cycloSPORINE (RESTASIS) 0.05 % ophthalmic emulsion, Administer 1 drop to both eyes 2 (two) times a day, Disp: , Rfl:     DULoxetine (CYMBALTA) 60 mg delayed release capsule, Take 100 mg by mouth daily , Disp: , Rfl:     famotidine (PEPCID) 20 mg tablet, Take 20 mg by mouth 2 (two) times a day, Disp: , Rfl:     fluticasone (FLONASE) 50 mcg/act nasal spray, 2 sprays into each nostril as needed , Disp: , Rfl:     glimepiride (AMARYL) 1 mg tablet, Take 1 mg by mouth every morning before breakfast, Disp: , Rfl:     Jardiance 10 MG TABS tablet, , Disp: , Rfl:     metoprolol tartrate (LOPRESSOR) 50 mg tablet, Take 50 mg by mouth every 12 (twelve) hours, Disp: , Rfl:     polyethylene glycol (GLYCOLAX) " "17 GM/SCOOP powder, Take 17 g by mouth daily, Disp: , Rfl:     QUEtiapine (SEROquel) 100 mg tablet, Take 100 mg by mouth daily at bedtime, Disp: , Rfl:     rosuvastatin (CRESTOR) 5 mg tablet, Take 5 mg by mouth daily, Disp: , Rfl:     Rybelsus 7 MG TABS, , Disp: , Rfl:     valsartan (DIOVAN) 320 MG tablet, , Disp: , Rfl:     Vascepa 1 g CAPS, , Disp: , Rfl:     Alphagan P 0.1 %, , Disp: , Rfl:     Azelastine HCl 0.15 % SOLN, , Disp: , Rfl:     cyclobenzaprine (FLEXERIL) 5 mg tablet, Take 1 tablet (5 mg total) by mouth 3 (three) times a day as needed for muscle spasms (Patient not taking: Reported on 10/19/2023), Disp: 90 tablet, Rfl: 0    fenofibrate (TRICOR) 48 mg tablet, , Disp: , Rfl:     ALLERGIES:  Allergies   Allergen Reactions    Lisinopril-Hydrochlorothiazide Throat Swelling    Penicillins Hives    Nsaids Other (See Comments)     Pt only has one kidney- should not take NSAIDS.       Review of systems:   Constitutional: Negative for fatigue, fever or loss of apetite.   HENT: Negative.    Respiratory: Negative for shortness of breath, dyspnea.    Cardiovascular: Negative for chest pain/tightness.   Gastrointestinal: Negative for abdominal pain, N/V.   Endocrine: Negative for cold/heat intolerance, unexplained weight loss/gain.   Genitourinary: Negative for flank pain, dysuria, hematuria.   Musculoskeletal: As noted in HPI.    Skin: Negative for rash.    Neurological: Negative.  Psychiatric/Behavioral: Negative for agitation.  _____________________________________________________  PHYSICAL EXAMINATION:    Blood pressure 118/82, pulse 66, temperature (!) 97.2 °F (36.2 °C), height 5' 8\" (1.727 m).    General: well developed and well nourished, alert, oriented times 3, and appears comfortable  Psychiatric: Normal  HEENT: Benign  Cardiovascular: Regular    Pulmonary: No wheezing or stridor  Abdomen: Soft, Nontender  Skin: No masses, erythema, lacerations, fluctation, ulcerations  Neurovascular: " Intact    MUSCULOSKELETAL EXAMINATION:    left Hip -  Patient presents with no anatomical deformity  Ambulates with antalgic gait pattern  Uses No assistive devices  TTP over greater trochanteric bursa  ROM: 20° seated IR, 60° seated ER. 120° hip flexion with pain, 40° abduction with pain.   Strength: 5/5 throughout  Knee flexor and extensor mechanism intact  Ankle DF and PF mechanism intact  - Straight Leg Raise; negative Stinchfield  - SERENA, - FADIR  2+ TP and DP pulses with brisk capillary refill to the toes  Sural, saphenous, tibial, superficial and deep peroneal motor and sensory distribution intact  Sensation to light touch intact distally      _____________________________________________________  STUDIES REVIEWED:    X-Ray of left hip were obtained today and demonstrated no significant left hip degenerative disease.      PROCEDURES:  Large joint arthrocentesis: L greater trochanteric bursa  Universal Protocol:  Consent: Verbal consent obtained.  Patient understanding: patient states understanding of the procedure being performed  Supporting Documentation  Indications: pain   Procedure Details  Location: hip - L greater trochanteric bursa  Needle size: 22 G  Ultrasound guidance: no  Approach: lateral  Medications administered: 4 mL bupivacaine 0.25 %; 40 mg triamcinolone acetonide 40 mg/mL              Scribe Attestation      I,:  Mariya Velasquez am acting as a scribe while in the presence of the attending physician.:       I,:  Gwyn Cheung personally performed the services described in this documentation    as scribed in my presence.:

## 2024-03-19 ENCOUNTER — DOCTOR'S OFFICE (OUTPATIENT)
Dept: URBAN - NONMETROPOLITAN AREA CLINIC 1 | Facility: CLINIC | Age: 64
Setting detail: OPHTHALMOLOGY
End: 2024-03-19
Payer: MEDICARE

## 2024-03-19 DIAGNOSIS — H04.122: ICD-10-CM

## 2024-03-19 DIAGNOSIS — H04.121: ICD-10-CM

## 2024-03-19 DIAGNOSIS — H04.123: ICD-10-CM

## 2024-03-19 DIAGNOSIS — H40.013: ICD-10-CM

## 2024-03-19 PROCEDURE — 83861 MICROFLUID ANALY TEARS: CPT | Mod: QW,RT | Performed by: OPHTHALMOLOGY

## 2024-03-19 PROCEDURE — 83861 MICROFLUID ANALY TEARS: CPT | Mod: QW,LT | Performed by: OPHTHALMOLOGY

## 2024-03-19 PROCEDURE — 99212 OFFICE O/P EST SF 10 MIN: CPT | Mod: 25 | Performed by: OPHTHALMOLOGY

## 2024-03-19 PROCEDURE — 68761 CLOSE TEAR DUCT OPENING: CPT | Mod: 50 | Performed by: OPHTHALMOLOGY

## 2024-04-24 ENCOUNTER — TELEPHONE (OUTPATIENT)
Dept: DENTISTRY | Facility: CLINIC | Age: 64
End: 2024-04-24

## 2024-04-24 NOTE — TELEPHONE ENCOUNTER
PT has dental pain and is looking for a dentist.    I referred to her insurance, put her on both lists and advised to also make Dr. Oritz - which she told me she did and is scheduled tomorrow.    I will put on both cx/NS list and NP wait list.    SB

## 2024-05-13 ENCOUNTER — RX ONLY (RX ONLY)
Age: 64
End: 2024-05-13

## 2024-05-13 ENCOUNTER — DOCTOR'S OFFICE (OUTPATIENT)
Dept: URBAN - NONMETROPOLITAN AREA CLINIC 1 | Facility: CLINIC | Age: 64
Setting detail: OPHTHALMOLOGY
End: 2024-05-13
Payer: MEDICARE

## 2024-05-13 DIAGNOSIS — H04.123: ICD-10-CM

## 2024-05-13 DIAGNOSIS — H18.832: ICD-10-CM

## 2024-05-13 PROCEDURE — 92012 INTRM OPH EXAM EST PATIENT: CPT | Performed by: OPTOMETRIST

## 2024-05-13 ASSESSMENT — CONFRONTATIONAL VISUAL FIELD TEST (CVF)
OD_FINDINGS: FULL
OS_FINDINGS: FULL

## 2024-05-22 ENCOUNTER — DOCTOR'S OFFICE (OUTPATIENT)
Dept: URBAN - NONMETROPOLITAN AREA CLINIC 1 | Facility: CLINIC | Age: 64
Setting detail: OPHTHALMOLOGY
End: 2024-05-22
Payer: MEDICARE

## 2024-05-22 DIAGNOSIS — H04.123: ICD-10-CM

## 2024-05-22 DIAGNOSIS — H18.832: ICD-10-CM

## 2024-05-22 PROBLEM — H26.493 POSTERIOR CAPSULAR OPACIFICATION; BOTH EYES: Status: ACTIVE | Noted: 2024-05-13

## 2024-05-22 PROCEDURE — 92012 INTRM OPH EXAM EST PATIENT: CPT | Performed by: OPTOMETRIST

## 2024-05-22 ASSESSMENT — CONFRONTATIONAL VISUAL FIELD TEST (CVF)
OD_FINDINGS: FULL
OS_FINDINGS: FULL

## 2024-06-25 ENCOUNTER — DOCTOR'S OFFICE (OUTPATIENT)
Dept: URBAN - NONMETROPOLITAN AREA CLINIC 1 | Facility: CLINIC | Age: 64
Setting detail: OPHTHALMOLOGY
End: 2024-06-25
Payer: MEDICARE

## 2024-06-25 DIAGNOSIS — H40.013: ICD-10-CM

## 2024-06-25 DIAGNOSIS — H47.011: ICD-10-CM

## 2024-06-25 DIAGNOSIS — H02.011: ICD-10-CM

## 2024-06-25 DIAGNOSIS — E11.3293: ICD-10-CM

## 2024-06-25 DIAGNOSIS — H04.123: ICD-10-CM

## 2024-06-25 DIAGNOSIS — Z79.4: ICD-10-CM

## 2024-06-25 PROBLEM — H04.121 DRY EYE; RIGHT EYE, LEFT EYE: Status: ACTIVE | Noted: 2024-06-25

## 2024-06-25 PROBLEM — H04.122 DRY EYE; RIGHT EYE, LEFT EYE: Status: ACTIVE | Noted: 2024-06-25

## 2024-06-25 PROCEDURE — 83861 MICROFLUID ANALY TEARS: CPT | Mod: QW,RT | Performed by: OPHTHALMOLOGY

## 2024-06-25 PROCEDURE — 76514 ECHO EXAM OF EYE THICKNESS: CPT | Performed by: OPHTHALMOLOGY

## 2024-06-25 PROCEDURE — 92250 FUNDUS PHOTOGRAPHY W/I&R: CPT | Performed by: OPHTHALMOLOGY

## 2024-06-25 PROCEDURE — 83861 MICROFLUID ANALY TEARS: CPT | Mod: QW,LT | Performed by: OPHTHALMOLOGY

## 2024-06-25 PROCEDURE — 67820 REVISE EYELASHES: CPT | Mod: E3 | Performed by: OPHTHALMOLOGY

## 2024-06-25 PROCEDURE — 68761 CLOSE TEAR DUCT OPENING: CPT | Mod: E4,E2 | Performed by: OPHTHALMOLOGY

## 2024-06-25 PROCEDURE — 99213 OFFICE O/P EST LOW 20 MIN: CPT | Mod: 25 | Performed by: OPHTHALMOLOGY

## 2024-06-25 ASSESSMENT — CONFRONTATIONAL VISUAL FIELD TEST (CVF)
OD_FINDINGS: FULL
OS_FINDINGS: FULL

## 2024-06-25 ASSESSMENT — LID EXAM ASSESSMENTS: OD_TRICHIASIS: RUL

## 2024-10-04 ENCOUNTER — HOSPITAL ENCOUNTER (OUTPATIENT)
Dept: ULTRASOUND IMAGING | Facility: HOSPITAL | Age: 64
End: 2024-10-04
Payer: MEDICARE

## 2024-10-04 DIAGNOSIS — E04.2 MULTINODULAR THYROID: ICD-10-CM

## 2024-10-04 PROCEDURE — 76536 US EXAM OF HEAD AND NECK: CPT

## 2025-01-08 ENCOUNTER — DOCTOR'S OFFICE (OUTPATIENT)
Dept: URBAN - NONMETROPOLITAN AREA CLINIC 1 | Facility: CLINIC | Age: 65
Setting detail: OPHTHALMOLOGY
End: 2025-01-08
Payer: MEDICARE

## 2025-01-08 DIAGNOSIS — H04.122: ICD-10-CM

## 2025-01-08 DIAGNOSIS — E11.3293: ICD-10-CM

## 2025-01-08 DIAGNOSIS — H47.011: ICD-10-CM

## 2025-01-08 DIAGNOSIS — H40.013: ICD-10-CM

## 2025-01-08 DIAGNOSIS — Z79.4: ICD-10-CM

## 2025-01-08 DIAGNOSIS — H04.121: ICD-10-CM

## 2025-01-08 PROCEDURE — 92134 CPTRZ OPH DX IMG PST SGM RTA: CPT | Performed by: OPHTHALMOLOGY

## 2025-01-08 PROCEDURE — 92083 EXTENDED VISUAL FIELD XM: CPT | Performed by: OPHTHALMOLOGY

## 2025-01-08 PROCEDURE — 99214 OFFICE O/P EST MOD 30 MIN: CPT | Performed by: OPHTHALMOLOGY

## 2025-01-08 ASSESSMENT — REFRACTION_CURRENTRX
OS_OVR_VA: 20/
OD_VPRISM_DIRECTION: SV
OS_SPHERE: -7.50
OS_CYLINDER: -1.75
OD_OVR_VA: 20/
OD_SPHERE: -8.75
OD_AXIS: 104
OD_CYLINDER: -1.50
OS_VPRISM_DIRECTION: SV
OS_AXIS: 97

## 2025-01-08 ASSESSMENT — LID EXAM ASSESSMENTS: OD_TRICHIASIS: RUL

## 2025-01-08 ASSESSMENT — DRY EYES - PHYSICIAN NOTES
OD_GENERALCOMMENTS: K SICCA
OS_GENERALCOMMENTS: K SICCA

## 2025-01-08 ASSESSMENT — PACHYMETRY
OS_CT_UM: 589
OD_CT_CORRECTION: -3
OD_CT_UM: 583
OS_CT_CORRECTION: -3

## 2025-01-08 ASSESSMENT — REFRACTION_AUTOREFRACTION
OS_SPHERE: -0.50
OS_CYLINDER: -1.00
OD_SPHERE: -1.00
OS_AXIS: 082
OD_AXIS: 105
OD_CYLINDER: -0.50

## 2025-01-08 ASSESSMENT — TONOMETRY: OS_IOP_MMHG: 20

## 2025-01-08 ASSESSMENT — VISUAL ACUITY
OD_BCVA: 20/25+1
OS_BCVA: 20/50+2

## 2025-01-08 ASSESSMENT — CONFRONTATIONAL VISUAL FIELD TEST (CVF)
OS_FINDINGS: FULL
OD_FINDINGS: FULL

## 2025-07-23 ENCOUNTER — DOCTOR'S OFFICE (OUTPATIENT)
Dept: URBAN - NONMETROPOLITAN AREA CLINIC 1 | Facility: CLINIC | Age: 65
Setting detail: OPHTHALMOLOGY
End: 2025-07-23
Payer: MEDICARE

## 2025-07-23 DIAGNOSIS — H04.121: ICD-10-CM

## 2025-07-23 DIAGNOSIS — E11.3293: ICD-10-CM

## 2025-07-23 DIAGNOSIS — Z96.1: ICD-10-CM

## 2025-07-23 DIAGNOSIS — H04.122: ICD-10-CM

## 2025-07-23 DIAGNOSIS — Z79.4: ICD-10-CM

## 2025-07-23 DIAGNOSIS — H40.013: ICD-10-CM

## 2025-07-23 DIAGNOSIS — H47.011: ICD-10-CM

## 2025-07-23 PROCEDURE — 92133 CPTRZD OPH DX IMG PST SGM ON: CPT | Performed by: OPHTHALMOLOGY

## 2025-07-23 PROCEDURE — 83861 MICROFLUID ANALY TEARS: CPT | Mod: QW,LT | Performed by: OPHTHALMOLOGY

## 2025-07-23 PROCEDURE — 99213 OFFICE O/P EST LOW 20 MIN: CPT | Performed by: OPHTHALMOLOGY

## 2025-07-23 PROCEDURE — 83861 MICROFLUID ANALY TEARS: CPT | Mod: QW,RT | Performed by: OPHTHALMOLOGY

## 2025-07-23 ASSESSMENT — REFRACTION_AUTOREFRACTION
OD_CYLINDER: -0.75
OD_SPHERE: -0.50
OD_AXIS: 072

## 2025-07-23 ASSESSMENT — REFRACTION_CURRENTRX
OS_CYLINDER: -1.75
OD_AXIS: 104
OD_VPRISM_DIRECTION: SV
OD_SPHERE: -8.75
OS_OVR_VA: 20/
OD_CYLINDER: -1.50
OS_AXIS: 97
OS_SPHERE: -7.50
OD_OVR_VA: 20/
OS_VPRISM_DIRECTION: SV

## 2025-07-23 ASSESSMENT — DRY EYES - PHYSICIAN NOTES
OS_GENERALCOMMENTS: K SICCA
OD_GENERALCOMMENTS: K SICCA

## 2025-07-23 ASSESSMENT — VISUAL ACUITY
OS_BCVA: 20/40
OD_BCVA: 20/25+2

## 2025-07-23 ASSESSMENT — CONFRONTATIONAL VISUAL FIELD TEST (CVF)
OS_FINDINGS: FULL
OD_FINDINGS: FULL

## 2025-07-23 ASSESSMENT — TONOMETRY
OS_IOP_MMHG: 20
OD_IOP_MMHG: 20

## 2025-07-23 ASSESSMENT — PACHYMETRY
OD_CT_CORRECTION: -3
OD_CT_UM: 583
OS_CT_UM: 589
OS_CT_CORRECTION: -3

## (undated) DEVICE — NEEDLE 25G X 1 1/2

## (undated) DEVICE — GAUZE SPONGES,USP TYPE VII GAUZE, 12 PLY: Brand: CURITY

## (undated) DEVICE — SYRINGE 10ML LL

## (undated) DEVICE — PLASTIC ADHESIVE BANDAGE: Brand: CURITY

## (undated) DEVICE — KIT COLON W/ 1.1OZ LUB AND 2 END

## (undated) DEVICE — SYRINGE 5ML LL

## (undated) DEVICE — NEEDLE SPINAL 22G X 3.5IN  QUINCKE

## (undated) DEVICE — DRAPE TOWEL: Brand: CONVERTORS

## (undated) DEVICE — FLEXIBLE ADHESIVE BANDAGE,X-LARGE: Brand: CURITY

## (undated) DEVICE — BASIN EMESIS 500CC ROSE 250/CS 60/PLT: Brand: MEDEGEN MEDICAL PRODUCTS, LLC

## (undated) DEVICE — CHLORAPREP HI-LITE 10.5ML ORANGE

## (undated) DEVICE — GLOVE SRG LF STRL BGL SKNSNS 7.5 PF

## (undated) DEVICE — SKIN MARKER DUAL TIP WITH RULER CAP, FLEXIBLE RULER AND LABELS: Brand: DEVON

## (undated) DEVICE — Device

## (undated) DEVICE — PAD GROUNDING IONIC RF DISP

## (undated) DEVICE — UTILITY MARKER,BLACK WITH LABELS: Brand: DEVON

## (undated) DEVICE — NEEDLE 18 G X 1 1/2 SAFETY

## (undated) DEVICE — ELECTRODE RF 10CM

## (undated) DEVICE — IV EXTENSION TUBING SMALL BORE

## (undated) DEVICE — TUBING, SUCTION, 3/16" X 6', STRAIGHT: Brand: MEDLINE

## (undated) DEVICE — FLEX ADVANTAGE 1500CC: Brand: FLEX ADVANTAGE

## (undated) DEVICE — KENDALL 500 SERIES DIAPHORETIC FOAM MONITORING ELECTRODE - TEAR DROP SHAPE ( 30/PK): Brand: KENDALL

## (undated) DEVICE — BITE BLK ENDOSCP AD 54FR GRN POLYETH ENDOSCP W STRP SLD

## (undated) DEVICE — DRAPE SHEET THREE QUARTER

## (undated) DEVICE — GLOVE INDICATOR PI UNDERGLOVE SZ 7.5 BLUE

## (undated) DEVICE — TRAY EPID CONT PERIFIX 18G X 3.5IN 10ML CLSD TIP

## (undated) DEVICE — BLOCK BITE BLOX W DENTAL RIM --

## (undated) DEVICE — GAUZE SPONGES,16 PLY: Brand: CURITY

## (undated) DEVICE — CONTAINER PREFIL FRMLN 15ML --

## (undated) DEVICE — TELFA ADHESIVE ISLAND DRESSING: Brand: TELFA

## (undated) DEVICE — NEEDLE BLUNT 18 G X 1 1/2IN

## (undated) DEVICE — SYRINGE LOR 8ML PLASTIC LL

## (undated) DEVICE — MEDI-VAC NON-CONDUCTIVE SUCTION TUBING: Brand: CARDINAL HEALTH

## (undated) DEVICE — Device: Brand: DEFENDO VALVE AND CONNECTOR KIT

## (undated) DEVICE — TRAY EPID PERIFIX CUSTOM

## (undated) DEVICE — SYR 50ML SLIP TIP NSAF LF STRL --